# Patient Record
Sex: MALE | Race: WHITE | NOT HISPANIC OR LATINO | URBAN - METROPOLITAN AREA
[De-identification: names, ages, dates, MRNs, and addresses within clinical notes are randomized per-mention and may not be internally consistent; named-entity substitution may affect disease eponyms.]

---

## 2017-04-18 ENCOUNTER — HISTORICAL (OUTPATIENT)
Dept: LAB | Facility: HOSPITAL | Age: 27
End: 2017-04-18

## 2019-06-10 ENCOUNTER — HISTORICAL (OUTPATIENT)
Dept: LAB | Facility: HOSPITAL | Age: 29
End: 2019-06-10

## 2023-04-12 ENCOUNTER — HOSPITAL ENCOUNTER (EMERGENCY)
Facility: HOSPITAL | Age: 33
Discharge: PSYCHIATRIC OR SUBSTANCE USE FACILITY | End: 2023-04-12
Attending: SPECIALIST
Payer: MEDICARE

## 2023-04-12 VITALS
SYSTOLIC BLOOD PRESSURE: 113 MMHG | DIASTOLIC BLOOD PRESSURE: 75 MMHG | BODY MASS INDEX: 24.38 KG/M2 | OXYGEN SATURATION: 98 % | TEMPERATURE: 98 F | RESPIRATION RATE: 18 BRPM | WEIGHT: 190 LBS | HEART RATE: 85 BPM | HEIGHT: 74 IN

## 2023-04-12 DIAGNOSIS — F31.9 BIPOLAR AFFECTIVE DISORDER, REMISSION STATUS UNSPECIFIED: ICD-10-CM

## 2023-04-12 DIAGNOSIS — F23 ACUTE PSYCHOSIS: Primary | ICD-10-CM

## 2023-04-12 DIAGNOSIS — Z91.148 NONCOMPLIANCE WITH MEDICATION REGIMEN: ICD-10-CM

## 2023-04-12 DIAGNOSIS — F12.90 MARIJUANA USE: ICD-10-CM

## 2023-04-12 LAB
ALBUMIN SERPL-MCNC: 4 G/DL (ref 3.5–5)
ALBUMIN/GLOB SERPL: 1.2 RATIO (ref 1.1–2)
ALP SERPL-CCNC: 51 UNIT/L (ref 40–150)
ALT SERPL-CCNC: 17 UNIT/L (ref 0–55)
AMPHET UR QL SCN: NEGATIVE
APAP SERPL-MCNC: <17.4 UG/ML (ref 17.4–30)
APPEARANCE UR: CLEAR
AST SERPL-CCNC: 17 UNIT/L (ref 5–34)
BACTERIA #/AREA URNS AUTO: NORMAL /HPF
BARBITURATE SCN PRESENT UR: NEGATIVE
BASOPHILS # BLD AUTO: 0.01 X10(3)/MCL (ref 0–0.2)
BASOPHILS NFR BLD AUTO: 0.1 %
BENZODIAZ UR QL SCN: NEGATIVE
BILIRUB UR QL STRIP.AUTO: NEGATIVE MG/DL
BILIRUBIN DIRECT+TOT PNL SERPL-MCNC: 0.5 MG/DL
BUN SERPL-MCNC: 25.1 MG/DL (ref 8.9–20.6)
CALCIUM SERPL-MCNC: 9.6 MG/DL (ref 8.4–10.2)
CANNABINOIDS UR QL SCN: POSITIVE
CHLORIDE SERPL-SCNC: 111 MMOL/L (ref 98–107)
CO2 SERPL-SCNC: 21 MMOL/L (ref 22–29)
COCAINE UR QL SCN: NEGATIVE
COLOR UR AUTO: YELLOW
CREAT SERPL-MCNC: 0.85 MG/DL (ref 0.73–1.18)
EOSINOPHIL # BLD AUTO: 0.05 X10(3)/MCL (ref 0–0.9)
EOSINOPHIL NFR BLD AUTO: 0.5 %
ERYTHROCYTE [DISTWIDTH] IN BLOOD BY AUTOMATED COUNT: 12.4 % (ref 11.5–17)
ETHANOL SERPL-MCNC: <10 MG/DL
FLUAV AG UPPER RESP QL IA.RAPID: NOT DETECTED
FLUBV AG UPPER RESP QL IA.RAPID: NOT DETECTED
GFR SERPLBLD CREATININE-BSD FMLA CKD-EPI: >60 MLS/MIN/1.73/M2
GLOBULIN SER-MCNC: 3.4 GM/DL (ref 2.4–3.5)
GLUCOSE SERPL-MCNC: 100 MG/DL (ref 74–100)
GLUCOSE UR QL STRIP.AUTO: NEGATIVE MG/DL
HCT VFR BLD AUTO: 40.9 % (ref 42–52)
HGB BLD-MCNC: 13.3 G/DL (ref 14–18)
IMM GRANULOCYTES # BLD AUTO: 0.02 X10(3)/MCL (ref 0–0.04)
IMM GRANULOCYTES NFR BLD AUTO: 0.2 %
KETONES UR QL STRIP.AUTO: NEGATIVE MG/DL
LEUKOCYTE ESTERASE UR QL STRIP.AUTO: NEGATIVE UNIT/L
LYMPHOCYTES # BLD AUTO: 2.1 X10(3)/MCL (ref 0.6–4.6)
LYMPHOCYTES NFR BLD AUTO: 21 %
MCH RBC QN AUTO: 30.8 PG (ref 27–31)
MCHC RBC AUTO-ENTMCNC: 32.5 G/DL (ref 33–36)
MCV RBC AUTO: 94.7 FL (ref 80–94)
MONOCYTES # BLD AUTO: 0.76 X10(3)/MCL (ref 0.1–1.3)
MONOCYTES NFR BLD AUTO: 7.6 %
NEUTROPHILS # BLD AUTO: 7.07 X10(3)/MCL (ref 2.1–9.2)
NEUTROPHILS NFR BLD AUTO: 70.6 %
NITRITE UR QL STRIP.AUTO: NEGATIVE
OPIATES UR QL SCN: NEGATIVE
PCP UR QL: NEGATIVE
PH UR STRIP.AUTO: 6 [PH]
PH UR: 6 [PH] (ref 3–11)
PLATELET # BLD AUTO: 263 X10(3)/MCL (ref 130–400)
PMV BLD AUTO: 9.5 FL (ref 7.4–10.4)
POTASSIUM SERPL-SCNC: 4.1 MMOL/L (ref 3.5–5.1)
PROT SERPL-MCNC: 7.4 GM/DL (ref 6.4–8.3)
PROT UR QL STRIP.AUTO: ABNORMAL MG/DL
RBC # BLD AUTO: 4.32 X10(6)/MCL (ref 4.7–6.1)
RBC #/AREA URNS AUTO: NORMAL /HPF
RBC UR QL AUTO: NEGATIVE UNIT/L
SALICYLATES SERPL-MCNC: <5 MG/DL
SARS-COV-2 RNA RESP QL NAA+PROBE: NOT DETECTED
SODIUM SERPL-SCNC: 142 MMOL/L (ref 136–145)
SP GR UR STRIP.AUTO: >=1.03
SQUAMOUS #/AREA URNS AUTO: NORMAL /HPF
TSH SERPL-ACNC: 0.64 UIU/ML (ref 0.35–4.94)
UROBILINOGEN UR STRIP-ACNC: 0.2 MG/DL
WBC # SPEC AUTO: 10 X10(3)/MCL (ref 4.5–11.5)
WBC #/AREA URNS AUTO: NORMAL /HPF

## 2023-04-12 PROCEDURE — 80053 COMPREHEN METABOLIC PANEL: CPT | Performed by: SPECIALIST

## 2023-04-12 PROCEDURE — 81001 URINALYSIS AUTO W/SCOPE: CPT | Performed by: SPECIALIST

## 2023-04-12 PROCEDURE — 82077 ASSAY SPEC XCP UR&BREATH IA: CPT | Performed by: SPECIALIST

## 2023-04-12 PROCEDURE — 96372 THER/PROPH/DIAG INJ SC/IM: CPT | Performed by: SPECIALIST

## 2023-04-12 PROCEDURE — 63600175 PHARM REV CODE 636 W HCPCS: Performed by: SPECIALIST

## 2023-04-12 PROCEDURE — 99285 EMERGENCY DEPT VISIT HI MDM: CPT | Mod: 25

## 2023-04-12 PROCEDURE — 0240U COVID/FLU A&B PCR: CPT | Performed by: SPECIALIST

## 2023-04-12 PROCEDURE — 80179 DRUG ASSAY SALICYLATE: CPT | Performed by: SPECIALIST

## 2023-04-12 PROCEDURE — 80143 DRUG ASSAY ACETAMINOPHEN: CPT | Performed by: SPECIALIST

## 2023-04-12 PROCEDURE — 85025 COMPLETE CBC W/AUTO DIFF WBC: CPT | Performed by: SPECIALIST

## 2023-04-12 PROCEDURE — 84443 ASSAY THYROID STIM HORMONE: CPT | Performed by: SPECIALIST

## 2023-04-12 PROCEDURE — 96374 THER/PROPH/DIAG INJ IV PUSH: CPT

## 2023-04-12 PROCEDURE — 80307 DRUG TEST PRSMV CHEM ANLYZR: CPT | Performed by: SPECIALIST

## 2023-04-12 RX ORDER — DIPHENHYDRAMINE HYDROCHLORIDE 50 MG/ML
50 INJECTION INTRAMUSCULAR; INTRAVENOUS
Status: COMPLETED | OUTPATIENT
Start: 2023-04-12 | End: 2023-04-12

## 2023-04-12 RX ORDER — LORAZEPAM 2 MG/ML
1 INJECTION INTRAMUSCULAR
Status: COMPLETED | OUTPATIENT
Start: 2023-04-12 | End: 2023-04-12

## 2023-04-12 RX ORDER — ZIPRASIDONE MESYLATE 20 MG/ML
20 INJECTION, POWDER, LYOPHILIZED, FOR SOLUTION INTRAMUSCULAR
Status: COMPLETED | OUTPATIENT
Start: 2023-04-12 | End: 2023-04-12

## 2023-04-12 RX ADMIN — LORAZEPAM 1 MG: 2 INJECTION INTRAMUSCULAR; INTRAVENOUS at 01:04

## 2023-04-12 RX ADMIN — ZIPRASIDONE MESYLATE 20 MG: 20 INJECTION, POWDER, LYOPHILIZED, FOR SOLUTION INTRAMUSCULAR at 01:04

## 2023-04-12 RX ADMIN — DIPHENHYDRAMINE HYDROCHLORIDE 50 MG: 50 INJECTION, SOLUTION INTRAMUSCULAR; INTRAVENOUS at 01:04

## 2023-04-12 NOTE — ED NOTES
Spd here to  patient. Pt arouses easily, ambulates to wheelchair and then into car.  Dc'd in stable condition. PEC, belongings and ppw sent with spd

## 2023-04-12 NOTE — ED NOTES
Spoke to Briseyda with [facility].  Patient accepted to [facility] in [place]. [phone].   Attempted to call parents to notify of placement. No answer. Left voice message asking to return call.

## 2023-04-12 NOTE — ED NOTES
Father notified of patient being transferred to [facility] in [place]. His father is unsure if patient is on medications.

## 2023-04-12 NOTE — ED NOTES
"Patient denies si/hi. He denies visual or auditory hallucinations. He states " I'm just stoned man."  Said that his job lets him smoke weed now.  He said it was real weed and not synthetic. He did say something about edibles but he was not able to elaborate on if he took some or not.  He is hyper active. He is able to tell me his name, , that he is in the hospital, year, and president.   "

## 2023-04-12 NOTE — ED PROVIDER NOTES
Encounter Date: 4/12/2023       History     Chief Complaint   Patient presents with    Psychiatric Evaluation     Patient's parents was attempting to bring patient to ED and he jumped of out the vehicle and went up to a  at the store and asked for help. Father reports that he has not been sleeping lately and been talking out of his head. Hx of bipolar with psychosis. Patient with bizarre behavior in triage     32-year-old male with history of bipolar disorder brought in by his parents and police for bizarre behavior; patient's parents were bringing him to the emergency room when he jumped out of the car and found a  in the store and asked him to help him; patient arrived to emergency room with bizarre behavior and tangential thinking; delusional at times and states he just wants to smoke marijuana and not take his medications    The history is provided by the patient, a parent and the police.   Review of patient's allergies indicates:  No Known Allergies  History reviewed. No pertinent past medical history.  History reviewed. No pertinent surgical history.  History reviewed. No pertinent family history.     Review of Systems   Unable to perform ROS: Psychiatric disorder     Physical Exam     Initial Vitals [04/12/23 0058]   BP Pulse Resp Temp SpO2   125/78 (!) 117 20 99.8 °F (37.7 °C) 97 %      MAP       --         Physical Exam    Nursing note and vitals reviewed.  Constitutional: He appears well-developed and well-nourished.   Disheveled   HENT:   Head: Normocephalic and atraumatic.   Eyes: EOM are normal. Pupils are equal, round, and reactive to light.   Neck: Neck supple.   Normal range of motion.  Cardiovascular:  Normal rate, regular rhythm and normal heart sounds.           Pulmonary/Chest: Breath sounds normal.   Abdominal: Abdomen is soft. He exhibits no distension. There is no abdominal tenderness.   Musculoskeletal:         General: Normal range of motion.      Cervical back: Normal  range of motion and neck supple.     Neurological: He is alert and oriented to person, place, and time.   Skin: Skin is warm and dry.   Psychiatric: His affect is labile. His speech is tangential. He is hyperactive. Thought content is paranoid. He is inattentive.       ED Course   Procedures  Labs Reviewed   COMPREHENSIVE METABOLIC PANEL - Abnormal; Notable for the following components:       Result Value    Chloride 111 (*)     Carbon Dioxide 21 (*)     Blood Urea Nitrogen 25.1 (*)     All other components within normal limits   URINALYSIS, REFLEX TO URINE CULTURE - Abnormal; Notable for the following components:    Protein, UA Trace (*)     All other components within normal limits   DRUG SCREEN, URINE (BEAKER) - Abnormal; Notable for the following components:    Cannabinoids, Urine Positive (*)     All other components within normal limits    Narrative:     Cut off concentrations:    Amphetamines - 1000 ng/ml  Barbiturates - 200 ng/ml  Benzodiazepine - 200 ng/ml  Cannabinoids (THC) - 50 ng/ml  Cocaine - 300 ng/ml  Fentanyl - 1.0 ng/ml  MDMA - 500 ng/ml  Opiates - 300 ng/ml   Phencyclidine (PCP) - 25 ng/ml    Specimen submitted for drug analysis and tested for pH and specific gravity in order to evaluate sample integrity. Suspect tampering if specific gravity is <1.003 and/or pH is not within the range of 4.5 - 8.0  False negatives may result form substances such as bleach added to urine.  False positives may result for the presence of a substance with similar chemical structure to the drug or its metabolite.    This test provides only a PRELIMINARY analytical test result. A more specific alternate chemical method must be used in order to obtain a confirmed analytical result. Gas chromatography/mass spectrometry (GC/MS) is the preferred confirmatory method. Other chemical confirmation methods are available. Clinical consideration and professional judgement should be applied to any drug of abuse test result,  particularly when preliminary positive results are used.    Positive results will be confirmed only at the physicians request. Unconfirmed screening results are to be used only for medical purposes (treatment).        ACETAMINOPHEN LEVEL - Abnormal; Notable for the following components:    Acetaminophen Level <17.4 (*)     All other components within normal limits   CBC WITH DIFFERENTIAL - Abnormal; Notable for the following components:    RBC 4.32 (*)     Hgb 13.3 (*)     Hct 40.9 (*)     MCV 94.7 (*)     MCHC 32.5 (*)     All other components within normal limits   TSH - Normal   ALCOHOL,MEDICAL (ETHANOL) - Normal   COVID/FLU A&B PCR - Normal    Narrative:     The Xpert Xpress SARS-CoV-2/FLU/RSV plus is a rapid, multiplexed real-time PCR test intended for the simultaneous qualitative detection and differentiation of SARS-CoV-2, Influenza A, Influenza B, and respiratory syncytial virus (RSV) viral RNA in either nasopharyngeal swab or nasal swab specimens.         URINALYSIS, MICROSCOPIC - Normal   CBC W/ AUTO DIFFERENTIAL    Narrative:     The following orders were created for panel order CBC auto differential.  Procedure                               Abnormality         Status                     ---------                               -----------         ------                     CBC with Differential[[id number]]        Abnormal            Final result                 Please view results for these tests on the individual orders.   SALICYLATE LEVEL          Imaging Results    None          Medications   ziprasidone injection 20 mg (20 mg Intramuscular Given 4/12/23 0138)   LORazepam injection 1 mg (1 mg Intravenous Given 4/12/23 0138)   diphenhydrAMINE injection 50 mg (50 mg Intramuscular Given 4/12/23 0138)     Medical Decision Making:   History:   Old Medical Records: I decided to obtain old medical records.  Initial Assessment:   Bipolar patient who is noncompliant with medication and admits to marijuana;  brought in with bizarre behavior and tangential thinking  Differential Diagnosis:   Bipolar disorder, noncompliance with medication, drug use  Clinical Tests:   Lab Tests: Ordered and Reviewed  ED Management:  PEC instituted; sedation given; labs drawn; accepted at psychiatric facility           ED Course as of 04/12/23 0410 Wed Apr 12, 2023 0409 Accepted at [facility] psychiatric [DD]      ED Course User Index  [DD] Adarsh Marcial MD       Medically cleared for psychiatry placement: 4/12/2023  2:22 AM         Clinical Impression:   Final diagnoses:  [F23] Acute psychosis (Primary)  [F31.9] Bipolar affective disorder, remission status unspecified  [Z91.148] Noncompliance with medication regimen  [F12.90] Marijuana use        ED Disposition Condition    Transfer to Psych Facility Stable          ED Prescriptions    None       Follow-up Information    None          Adarsh Marcial MD  04/12/23 0411

## 2023-06-14 ENCOUNTER — OFFICE VISIT (OUTPATIENT)
Dept: FAMILY MEDICINE | Facility: CLINIC | Age: 33
End: 2023-06-14
Payer: MEDICARE

## 2023-06-14 VITALS
HEIGHT: 74 IN | SYSTOLIC BLOOD PRESSURE: 118 MMHG | HEART RATE: 67 BPM | RESPIRATION RATE: 18 BRPM | BODY MASS INDEX: 24.13 KG/M2 | OXYGEN SATURATION: 100 % | WEIGHT: 188 LBS | DIASTOLIC BLOOD PRESSURE: 77 MMHG | TEMPERATURE: 98 F

## 2023-06-14 DIAGNOSIS — F31.9 BIPOLAR AFFECTIVE DISORDER, REMISSION STATUS UNSPECIFIED: ICD-10-CM

## 2023-06-14 DIAGNOSIS — Z00.00 ENCOUNTER FOR WELLNESS EXAMINATION: Primary | ICD-10-CM

## 2023-06-14 DIAGNOSIS — R09.81 SINUS CONGESTION: ICD-10-CM

## 2023-06-14 LAB
ALBUMIN SERPL-MCNC: 4.5 G/DL (ref 3.5–5)
ALBUMIN/GLOB SERPL: 1.4 RATIO (ref 1.1–2)
ALP SERPL-CCNC: 50 UNIT/L (ref 40–150)
ALT SERPL-CCNC: 13 UNIT/L (ref 0–55)
APPEARANCE UR: CLEAR
AST SERPL-CCNC: 18 UNIT/L (ref 5–34)
BACTERIA #/AREA URNS AUTO: ABNORMAL /HPF
BASOPHILS # BLD AUTO: 0.02 X10(3)/MCL
BASOPHILS NFR BLD AUTO: 0.3 %
BILIRUB UR QL STRIP.AUTO: NEGATIVE MG/DL
BILIRUBIN DIRECT+TOT PNL SERPL-MCNC: 1.4 MG/DL
BUN SERPL-MCNC: 16.5 MG/DL (ref 8.9–20.6)
CALCIUM SERPL-MCNC: 9.6 MG/DL (ref 8.4–10.2)
CHLORIDE SERPL-SCNC: 108 MMOL/L (ref 98–107)
CHOLEST SERPL-MCNC: 178 MG/DL
CHOLEST/HDLC SERPL: 4 {RATIO} (ref 0–5)
CO2 SERPL-SCNC: 25 MMOL/L (ref 22–29)
COLOR UR: YELLOW
CREAT SERPL-MCNC: 0.84 MG/DL (ref 0.73–1.18)
EOSINOPHIL # BLD AUTO: 0.11 X10(3)/MCL (ref 0–0.9)
EOSINOPHIL NFR BLD AUTO: 1.9 %
ERYTHROCYTE [DISTWIDTH] IN BLOOD BY AUTOMATED COUNT: 12.7 % (ref 11.5–17)
EST. AVERAGE GLUCOSE BLD GHB EST-MCNC: 91.1 MG/DL
GFR SERPLBLD CREATININE-BSD FMLA CKD-EPI: >60 MLS/MIN/1.73/M2
GLOBULIN SER-MCNC: 3.2 GM/DL (ref 2.4–3.5)
GLUCOSE SERPL-MCNC: 89 MG/DL (ref 74–100)
GLUCOSE UR QL STRIP.AUTO: NORMAL MG/DL
HAV IGM SERPL QL IA: NONREACTIVE
HBA1C MFR BLD: 4.8 %
HBV CORE IGM SERPL QL IA: NONREACTIVE
HBV SURFACE AG SERPL QL IA: NONREACTIVE
HCT VFR BLD AUTO: 43 % (ref 42–52)
HCV AB SERPL QL IA: NONREACTIVE
HDLC SERPL-MCNC: 44 MG/DL (ref 35–60)
HGB BLD-MCNC: 14.4 G/DL (ref 14–18)
HIV 1+2 AB+HIV1 P24 AG SERPL QL IA: NONREACTIVE
HYALINE CASTS #/AREA URNS LPF: ABNORMAL /LPF
IMM GRANULOCYTES # BLD AUTO: 0.03 X10(3)/MCL (ref 0–0.04)
IMM GRANULOCYTES NFR BLD AUTO: 0.5 %
KETONES UR QL STRIP.AUTO: NEGATIVE MG/DL
LDLC SERPL CALC-MCNC: 122 MG/DL (ref 50–140)
LEUKOCYTE ESTERASE UR QL STRIP.AUTO: NEGATIVE UNIT/L
LYMPHOCYTES # BLD AUTO: 1.93 X10(3)/MCL (ref 0.6–4.6)
LYMPHOCYTES NFR BLD AUTO: 32.8 %
MCH RBC QN AUTO: 31.6 PG (ref 27–31)
MCHC RBC AUTO-ENTMCNC: 33.5 G/DL (ref 33–36)
MCV RBC AUTO: 94.3 FL (ref 80–94)
MONOCYTES # BLD AUTO: 0.41 X10(3)/MCL (ref 0.1–1.3)
MONOCYTES NFR BLD AUTO: 7 %
MUCOUS THREADS URNS QL MICRO: ABNORMAL /LPF
NEUTROPHILS # BLD AUTO: 3.39 X10(3)/MCL (ref 2.1–9.2)
NEUTROPHILS NFR BLD AUTO: 57.5 %
NITRITE UR QL STRIP.AUTO: NEGATIVE
NRBC BLD AUTO-RTO: 0 %
PH UR STRIP.AUTO: 6.5 [PH]
PLATELET # BLD AUTO: 216 X10(3)/MCL (ref 130–400)
PMV BLD AUTO: 9.9 FL (ref 7.4–10.4)
POTASSIUM SERPL-SCNC: 4.3 MMOL/L (ref 3.5–5.1)
PROT SERPL-MCNC: 7.7 GM/DL (ref 6.4–8.3)
PROT UR QL STRIP.AUTO: ABNORMAL MG/DL
RBC # BLD AUTO: 4.56 X10(6)/MCL (ref 4.7–6.1)
RBC #/AREA URNS AUTO: ABNORMAL /HPF
RBC UR QL AUTO: NEGATIVE UNIT/L
SODIUM SERPL-SCNC: 141 MMOL/L (ref 136–145)
SP GR UR STRIP.AUTO: 1.03
SQUAMOUS #/AREA URNS LPF: ABNORMAL /HPF
T PALLIDUM AB SER QL: NONREACTIVE
T4 FREE SERPL-MCNC: 1.07 NG/DL (ref 0.7–1.48)
TRIGL SERPL-MCNC: 58 MG/DL (ref 34–140)
TSH SERPL-ACNC: 1.14 UIU/ML (ref 0.35–4.94)
UROBILINOGEN UR STRIP-ACNC: NORMAL MG/DL
VLDLC SERPL CALC-MCNC: 12 MG/DL
WBC # SPEC AUTO: 5.89 X10(3)/MCL (ref 4.5–11.5)
WBC #/AREA URNS AUTO: ABNORMAL /HPF

## 2023-06-14 PROCEDURE — 81001 URINALYSIS AUTO W/SCOPE: CPT

## 2023-06-14 PROCEDURE — 3074F PR MOST RECENT SYSTOLIC BLOOD PRESSURE < 130 MM HG: ICD-10-PCS | Mod: CPTII,,,

## 2023-06-14 PROCEDURE — 99214 OFFICE O/P EST MOD 30 MIN: CPT | Mod: PBBFAC,PN

## 2023-06-14 PROCEDURE — 84443 ASSAY THYROID STIM HORMONE: CPT

## 2023-06-14 PROCEDURE — 3008F PR BODY MASS INDEX (BMI) DOCUMENTED: ICD-10-PCS | Mod: CPTII,,,

## 2023-06-14 PROCEDURE — 99385 PR PREVENTIVE VISIT,NEW,18-39: ICD-10-PCS | Mod: S$PBB,,,

## 2023-06-14 PROCEDURE — 3078F DIAST BP <80 MM HG: CPT | Mod: CPTII,,,

## 2023-06-14 PROCEDURE — 3008F BODY MASS INDEX DOCD: CPT | Mod: CPTII,,,

## 2023-06-14 PROCEDURE — 1159F MED LIST DOCD IN RCRD: CPT | Mod: CPTII,,,

## 2023-06-14 PROCEDURE — 80074 ACUTE HEPATITIS PANEL: CPT

## 2023-06-14 PROCEDURE — 3074F SYST BP LT 130 MM HG: CPT | Mod: CPTII,,,

## 2023-06-14 PROCEDURE — 3078F PR MOST RECENT DIASTOLIC BLOOD PRESSURE < 80 MM HG: ICD-10-PCS | Mod: CPTII,,,

## 2023-06-14 PROCEDURE — 87389 HIV-1 AG W/HIV-1&-2 AB AG IA: CPT

## 2023-06-14 PROCEDURE — 84439 ASSAY OF FREE THYROXINE: CPT

## 2023-06-14 PROCEDURE — 1159F PR MEDICATION LIST DOCUMENTED IN MEDICAL RECORD: ICD-10-PCS | Mod: CPTII,,,

## 2023-06-14 PROCEDURE — 83036 HEMOGLOBIN GLYCOSYLATED A1C: CPT

## 2023-06-14 PROCEDURE — 80061 LIPID PANEL: CPT

## 2023-06-14 PROCEDURE — 36415 COLL VENOUS BLD VENIPUNCTURE: CPT

## 2023-06-14 PROCEDURE — 85025 COMPLETE CBC W/AUTO DIFF WBC: CPT

## 2023-06-14 PROCEDURE — 99385 PREV VISIT NEW AGE 18-39: CPT | Mod: S$PBB,,,

## 2023-06-14 PROCEDURE — 80053 COMPREHEN METABOLIC PANEL: CPT

## 2023-06-14 PROCEDURE — 1160F PR REVIEW ALL MEDS BY PRESCRIBER/CLIN PHARMACIST DOCUMENTED: ICD-10-PCS | Mod: CPTII,,,

## 2023-06-14 PROCEDURE — 86780 TREPONEMA PALLIDUM: CPT

## 2023-06-14 PROCEDURE — 1160F RVW MEDS BY RX/DR IN RCRD: CPT | Mod: CPTII,,,

## 2023-06-14 RX ORDER — FLUTICASONE PROPIONATE 50 MCG
1 SPRAY, SUSPENSION (ML) NASAL DAILY
Qty: 18.2 ML | Refills: 3 | Status: SHIPPED | OUTPATIENT
Start: 2023-06-14

## 2023-06-14 RX ORDER — OLANZAPINE 10 MG/1
10 TABLET ORAL NIGHTLY
COMMUNITY
Start: 2023-04-19 | End: 2023-06-14 | Stop reason: SDUPTHER

## 2023-06-14 RX ORDER — OLANZAPINE 10 MG/1
10 TABLET ORAL NIGHTLY
Qty: 30 TABLET | Refills: 0 | Status: SHIPPED | OUTPATIENT
Start: 2023-06-14 | End: 2023-07-14

## 2023-06-14 RX ORDER — LORATADINE 10 MG/1
10 TABLET ORAL DAILY
Qty: 30 TABLET | Refills: 3 | Status: SHIPPED | OUTPATIENT
Start: 2023-06-14

## 2023-06-14 NOTE — PROGRESS NOTES
"Patient Name: Oscar Casneco   : 1990  MRN: [id number]     Subjective:   Patient ID: Oscar Canseco is a 32 y.o. male.    Chief Complaint:   Chief Complaint   Patient presents with    Establish Care        HPI: 2023:  Patient presents to clinic today to establish care, was recently admitted to [facility] where he was started on Zyprexa.  Patient has history of bipolar disorder.  Patient states that Abilify "did not work well for him", Seroquel made him too drowsy.  Does not have current outpatient psychiatric provider to help manage his bipolar disorder.  Patient does complain of general sinus congestion associated with seasonal changes.  Patient denies chest pain, palpitations, and shortness of breath.  Patient denies fever, night sweats, chills, nausea, vomiting, diarrhea, constipation, weight loss, and changes in appetite. Denies SI/HI, yousif or hallucinations.             ROS:  Review of Systems   Constitutional:  Negative for chills, fever and weight loss.   HENT:  Positive for congestion. Negative for ear discharge, nosebleeds and tinnitus.    Eyes:  Negative for blurred vision, photophobia and pain.   Respiratory:  Negative for cough, shortness of breath, wheezing and stridor.    Cardiovascular:  Negative for chest pain, palpitations and orthopnea.   Gastrointestinal:  Negative for abdominal pain, heartburn and nausea.   Genitourinary:  Negative for dysuria, frequency, hematuria and urgency.   Musculoskeletal:  Negative for falls and myalgias.   Skin:  Negative for itching and rash.   Neurological:  Negative for dizziness, sensory change, speech change, focal weakness, seizures, weakness and headaches.   Endo/Heme/Allergies:  Negative for environmental allergies. Does not bruise/bleed easily.   Psychiatric/Behavioral:  Negative for hallucinations and suicidal ideas.     History:   History reviewed. No pertinent past medical history.   History reviewed. No pertinent surgical history.  History " "reviewed. No pertinent family history.   Social History     Tobacco Use    Smoking status: Every Day     Packs/day: 0.50     Types: Cigarettes    Smokeless tobacco: Never   Substance and Sexual Activity    Alcohol use: Not Currently    Drug use: Not Currently     Types: Opium    Sexual activity: Not on file        Allergies: Review of patient's allergies indicates:  No Known Allergies  Objective:     Vitals:    06/14/23 0742   BP: 118/77   Pulse: 67   Resp: 18   Temp: 98.3 °F (36.8 °C)   SpO2: 100%   Weight: 85.3 kg (188 lb)   Height: 6' 2" (1.88 m)     Body mass index is 24.14 kg/m².     Physical Examination:   Physical Exam  Vitals reviewed.   Constitutional:       Appearance: Normal appearance. He is normal weight.   HENT:      Head: Normocephalic.      Right Ear: Tympanic membrane, ear canal and external ear normal.      Left Ear: Tympanic membrane, ear canal and external ear normal.      Nose: Nose normal.      Mouth/Throat:      Mouth: Mucous membranes are moist.      Pharynx: Oropharynx is clear.   Eyes:      Extraocular Movements: Extraocular movements intact.      Conjunctiva/sclera: Conjunctivae normal.      Pupils: Pupils are equal, round, and reactive to light.   Cardiovascular:      Rate and Rhythm: Normal rate and regular rhythm.      Pulses: Normal pulses.      Heart sounds: Normal heart sounds.   Pulmonary:      Effort: Pulmonary effort is normal.      Breath sounds: Normal breath sounds.   Abdominal:      General: Abdomen is flat. Bowel sounds are normal.      Palpations: Abdomen is soft.   Musculoskeletal:         General: Normal range of motion.      Cervical back: Normal range of motion and neck supple.   Skin:     General: Skin is warm and dry.   Neurological:      General: No focal deficit present.      Mental Status: He is alert and oriented to person, place, and time.   Psychiatric:         Mood and Affect: Mood normal.         Behavior: Behavior normal.       Assessment and Plan     Problem " List Items Addressed This Visit          Psychiatric    Bipolar disorder    Current Assessment & Plan     Chronic issue, continue medications as managed by psychiatric provider    Read positive daily meditations, avoid negative media, set healthy boundaries.  Exercise daily, keep consistent sleep pattern, eat a healthy diet.  Establish good social support, make changes to reduce stress.  Reports any symptoms of suicidal/homicidal ideations or self harm immediately, if clinic is closed go to nearest emergency room.           Relevant Medications    OLANZapine (ZYPREXA) 10 MG tablet    Other Relevant Orders    Ambulatory referral/consult to Behavioral Health     Other Visit Diagnoses       Encounter for wellness examination    -  Primary    Relevant Orders    TSH (Completed)    T4, Free (Completed)    Hemoglobin A1C (Completed)    SYPHILIS ANTIBODY (WITH REFLEX RPR) (Completed)    Hepatitis Panel, Acute    Lipid Panel (Completed)    CBC Auto Differential (Completed)    Comprehensive Metabolic Panel (Completed)    HIV 1/2 Ag/Ab (4th Gen) (Completed)    Urinalysis, Reflex to Urine Culture (Completed)    Pathologist Interpretation    Sinus congestion        Relevant Medications    fluticasone propionate (FLONASE) 50 mcg/actuation nasal spray    loratadine (CLARITIN) 10 mg tablet              Oscar was seen today for establish care.    Diagnoses and all orders for this visit:    Encounter for wellness examination  -     TSH  -     T4, Free  -     Hemoglobin A1C  -     SYPHILIS ANTIBODY (WITH REFLEX RPR)  -     Hepatitis Panel, Acute  -     Lipid Panel  -     CBC Auto Differential  -     Comprehensive Metabolic Panel  -     HIV 1/2 Ag/Ab (4th Gen)  -     Urinalysis, Reflex to Urine Culture  -     Pathologist Interpretation    Bipolar affective disorder, remission status unspecified  -     OLANZapine (ZYPREXA) 10 MG tablet; Take 1 tablet (10 mg total) by mouth every evening.  -     Ambulatory referral/consult to Behavioral  Health; Future    Sinus congestion  -     fluticasone propionate (FLONASE) 50 mcg/actuation nasal spray; 1 spray (50 mcg total) by Each Nostril route once daily.  -     loratadine (CLARITIN) 10 mg tablet; Take 1 tablet (10 mg total) by mouth once daily.         No follow-ups on file.     This note was created with the assistance of Dragon voice recognition software or phone dictation. There may be transcription errors as a result of using this technology however minimal. Effort has been made to assure accuracy of transcription but any obvious errors or omissions should be clarified with the author of the document

## 2023-06-14 NOTE — ASSESSMENT & PLAN NOTE
Chronic issue, continue medications as managed by psychiatric provider    Read positive daily meditations, avoid negative media, set healthy boundaries.  Exercise daily, keep consistent sleep pattern, eat a healthy diet.  Establish good social support, make changes to reduce stress.  Reports any symptoms of suicidal/homicidal ideations or self harm immediately, if clinic is closed go to nearest emergency room.

## 2023-06-15 LAB — PATH REV: NORMAL

## 2024-11-07 ENCOUNTER — HOSPITAL ENCOUNTER (EMERGENCY)
Facility: HOSPITAL | Age: 34
Discharge: HOME | End: 2024-11-07
Attending: STUDENT IN AN ORGANIZED HEALTH CARE EDUCATION/TRAINING PROGRAM
Payer: MEDICARE

## 2024-11-07 ENCOUNTER — HOSPITAL ENCOUNTER (INPATIENT)
Facility: HOSPITAL | Age: 34
LOS: 7 days | Discharge: HOME | DRG: 885 | End: 2024-11-14
Attending: PSYCHIATRY & NEUROLOGY | Admitting: PSYCHIATRY & NEUROLOGY
Payer: MEDICARE

## 2024-11-07 VITALS
DIASTOLIC BLOOD PRESSURE: 66 MMHG | BODY MASS INDEX: 25.22 KG/M2 | SYSTOLIC BLOOD PRESSURE: 113 MMHG | HEART RATE: 80 BPM | RESPIRATION RATE: 18 BRPM | HEIGHT: 73 IN | WEIGHT: 190.31 LBS | TEMPERATURE: 98 F | OXYGEN SATURATION: 97 %

## 2024-11-07 DIAGNOSIS — F23 ACUTE PSYCHOSIS: ICD-10-CM

## 2024-11-07 DIAGNOSIS — Z00.8 MEDICAL CLEARANCE FOR PSYCHIATRIC ADMISSION: Primary | ICD-10-CM

## 2024-11-07 DIAGNOSIS — F29 PSYCHOSIS: ICD-10-CM

## 2024-11-07 LAB
ALBUMIN SERPL-MCNC: 4.4 G/DL (ref 3.5–5)
ALBUMIN/GLOB SERPL: 1.4 RATIO (ref 1.1–2)
ALP SERPL-CCNC: 53 UNIT/L (ref 40–150)
ALT SERPL-CCNC: 17 UNIT/L (ref 0–55)
AMPHET UR QL SCN: NEGATIVE
ANION GAP SERPL CALC-SCNC: 8 MEQ/L
APAP SERPL-MCNC: <3 UG/ML (ref 10–30)
AST SERPL-CCNC: 32 UNIT/L (ref 5–34)
BACTERIA #/AREA URNS AUTO: NORMAL /HPF
BARBITURATE SCN PRESENT UR: NEGATIVE
BASOPHILS # BLD AUTO: 0.01 X10(3)/MCL
BASOPHILS NFR BLD AUTO: 0.1 %
BENZODIAZ UR QL SCN: NEGATIVE
BILIRUB SERPL-MCNC: 1.5 MG/DL
BILIRUB UR QL STRIP.AUTO: ABNORMAL
BUN SERPL-MCNC: 13.4 MG/DL (ref 8.9–20.6)
CALCIUM SERPL-MCNC: 9.4 MG/DL (ref 8.4–10.2)
CANNABINOIDS UR QL SCN: POSITIVE
CHLORIDE SERPL-SCNC: 110 MMOL/L (ref 98–107)
CLARITY UR: CLEAR
CO2 SERPL-SCNC: 19 MMOL/L (ref 22–29)
COCAINE UR QL SCN: NEGATIVE
COLOR UR AUTO: YELLOW
CREAT SERPL-MCNC: 0.87 MG/DL (ref 0.72–1.25)
CREAT/UREA NIT SERPL: 15
EOSINOPHIL # BLD AUTO: 0.01 X10(3)/MCL (ref 0–0.9)
EOSINOPHIL NFR BLD AUTO: 0.1 %
ERYTHROCYTE [DISTWIDTH] IN BLOOD BY AUTOMATED COUNT: 12.4 % (ref 11.5–17)
ETHANOL SERPL-MCNC: <10 MG/DL
FENTANYL UR QL SCN: NEGATIVE
FLUAV AG UPPER RESP QL IA.RAPID: NOT DETECTED
FLUBV AG UPPER RESP QL IA.RAPID: NOT DETECTED
GFR SERPLBLD CREATININE-BSD FMLA CKD-EPI: >60 ML/MIN/1.73/M2
GLOBULIN SER-MCNC: 3.1 GM/DL (ref 2.4–3.5)
GLUCOSE SERPL-MCNC: 148 MG/DL (ref 74–100)
GLUCOSE UR QL STRIP: NEGATIVE
HCT VFR BLD AUTO: 38.3 % (ref 42–52)
HGB BLD-MCNC: 13.3 G/DL (ref 14–18)
HGB UR QL STRIP: NEGATIVE
IMM GRANULOCYTES # BLD AUTO: 0.01 X10(3)/MCL (ref 0–0.04)
IMM GRANULOCYTES NFR BLD AUTO: 0.1 %
KETONES UR QL STRIP: 15
LEUKOCYTE ESTERASE UR QL STRIP: NEGATIVE
LYMPHOCYTES # BLD AUTO: 0.85 X10(3)/MCL (ref 0.6–4.6)
LYMPHOCYTES NFR BLD AUTO: 7.3 %
MCH RBC QN AUTO: 32.1 PG (ref 27–31)
MCHC RBC AUTO-ENTMCNC: 34.7 G/DL (ref 33–36)
MCV RBC AUTO: 92.5 FL (ref 80–94)
MONOCYTES # BLD AUTO: 0.73 X10(3)/MCL (ref 0.1–1.3)
MONOCYTES NFR BLD AUTO: 6.2 %
NEUTROPHILS # BLD AUTO: 10.08 X10(3)/MCL (ref 2.1–9.2)
NEUTROPHILS NFR BLD AUTO: 86.2 %
NITRITE UR QL STRIP: NEGATIVE
OPIATES UR QL SCN: NEGATIVE
PCP UR QL: NEGATIVE
PH UR STRIP: 6 [PH]
PH UR: 6 [PH] (ref 3–11)
PLATELET # BLD AUTO: 192 X10(3)/MCL (ref 130–400)
PMV BLD AUTO: 9.6 FL (ref 7.4–10.4)
POTASSIUM SERPL-SCNC: 3.6 MMOL/L (ref 3.5–5.1)
PROT SERPL-MCNC: 7.5 GM/DL (ref 6.4–8.3)
PROT UR QL STRIP: 30
RBC # BLD AUTO: 4.14 X10(6)/MCL (ref 4.7–6.1)
RBC #/AREA URNS AUTO: NORMAL /HPF
SARS-COV-2 RNA RESP QL NAA+PROBE: NOT DETECTED
SODIUM SERPL-SCNC: 137 MMOL/L (ref 136–145)
SP GR UR STRIP.AUTO: 1.02 (ref 1–1.03)
SPECIFIC GRAVITY, URINE AUTO (.000) (OHS): 1.02 (ref 1–1.03)
SQUAMOUS #/AREA URNS AUTO: NORMAL /HPF
TSH SERPL-ACNC: 0.78 UIU/ML (ref 0.35–4.94)
UROBILINOGEN UR STRIP-ACNC: 0.2
WBC # BLD AUTO: 11.69 X10(3)/MCL (ref 4.5–11.5)
WBC #/AREA URNS AUTO: NORMAL /HPF

## 2024-11-07 PROCEDURE — 99284 EMERGENCY DEPT VISIT MOD MDM: CPT | Mod: 25

## 2024-11-07 PROCEDURE — 12400001 HC PSYCH SEMI-PRIVATE ROOM

## 2024-11-07 PROCEDURE — 25000003 PHARM REV CODE 250: Performed by: PSYCHIATRY & NEUROLOGY

## 2024-11-07 PROCEDURE — 96372 THER/PROPH/DIAG INJ SC/IM: CPT | Performed by: STUDENT IN AN ORGANIZED HEALTH CARE EDUCATION/TRAINING PROGRAM

## 2024-11-07 PROCEDURE — 84443 ASSAY THYROID STIM HORMONE: CPT | Performed by: STUDENT IN AN ORGANIZED HEALTH CARE EDUCATION/TRAINING PROGRAM

## 2024-11-07 PROCEDURE — 80053 COMPREHEN METABOLIC PANEL: CPT | Performed by: STUDENT IN AN ORGANIZED HEALTH CARE EDUCATION/TRAINING PROGRAM

## 2024-11-07 PROCEDURE — 25000003 PHARM REV CODE 250: Performed by: STUDENT IN AN ORGANIZED HEALTH CARE EDUCATION/TRAINING PROGRAM

## 2024-11-07 PROCEDURE — 80307 DRUG TEST PRSMV CHEM ANLYZR: CPT | Performed by: STUDENT IN AN ORGANIZED HEALTH CARE EDUCATION/TRAINING PROGRAM

## 2024-11-07 PROCEDURE — S4991 NICOTINE PATCH NONLEGEND: HCPCS | Performed by: STUDENT IN AN ORGANIZED HEALTH CARE EDUCATION/TRAINING PROGRAM

## 2024-11-07 PROCEDURE — 85025 COMPLETE CBC W/AUTO DIFF WBC: CPT | Performed by: STUDENT IN AN ORGANIZED HEALTH CARE EDUCATION/TRAINING PROGRAM

## 2024-11-07 PROCEDURE — 63600175 PHARM REV CODE 636 W HCPCS: Performed by: STUDENT IN AN ORGANIZED HEALTH CARE EDUCATION/TRAINING PROGRAM

## 2024-11-07 PROCEDURE — 0240U COVID/FLU A&B PCR: CPT | Performed by: STUDENT IN AN ORGANIZED HEALTH CARE EDUCATION/TRAINING PROGRAM

## 2024-11-07 PROCEDURE — 80143 DRUG ASSAY ACETAMINOPHEN: CPT | Performed by: STUDENT IN AN ORGANIZED HEALTH CARE EDUCATION/TRAINING PROGRAM

## 2024-11-07 PROCEDURE — 81003 URINALYSIS AUTO W/O SCOPE: CPT | Performed by: STUDENT IN AN ORGANIZED HEALTH CARE EDUCATION/TRAINING PROGRAM

## 2024-11-07 PROCEDURE — 82077 ASSAY SPEC XCP UR&BREATH IA: CPT | Performed by: STUDENT IN AN ORGANIZED HEALTH CARE EDUCATION/TRAINING PROGRAM

## 2024-11-07 RX ORDER — LORAZEPAM 1 MG/1
2 TABLET ORAL EVERY 4 HOURS PRN
Status: DISCONTINUED | OUTPATIENT
Start: 2024-11-07 | End: 2024-11-14 | Stop reason: HOSPADM

## 2024-11-07 RX ORDER — LORAZEPAM 2 MG/ML
2 INJECTION INTRAMUSCULAR EVERY 4 HOURS PRN
Status: DISCONTINUED | OUTPATIENT
Start: 2024-11-07 | End: 2024-11-14 | Stop reason: HOSPADM

## 2024-11-07 RX ORDER — ADHESIVE BANDAGE
30 BANDAGE TOPICAL DAILY PRN
Status: DISCONTINUED | OUTPATIENT
Start: 2024-11-07 | End: 2024-11-14 | Stop reason: HOSPADM

## 2024-11-07 RX ORDER — HYDROXYZINE HYDROCHLORIDE 50 MG/1
50 TABLET, FILM COATED ORAL EVERY 4 HOURS PRN
Status: DISCONTINUED | OUTPATIENT
Start: 2024-11-07 | End: 2024-11-14 | Stop reason: HOSPADM

## 2024-11-07 RX ORDER — TRAZODONE HYDROCHLORIDE 100 MG/1
100 TABLET ORAL NIGHTLY PRN
Status: DISCONTINUED | OUTPATIENT
Start: 2024-11-07 | End: 2024-11-14 | Stop reason: HOSPADM

## 2024-11-07 RX ORDER — IBUPROFEN 200 MG
1 TABLET ORAL
Status: DISCONTINUED | OUTPATIENT
Start: 2024-11-07 | End: 2024-11-07 | Stop reason: HOSPADM

## 2024-11-07 RX ORDER — ONDANSETRON 4 MG/1
4 TABLET, ORALLY DISINTEGRATING ORAL EVERY 6 HOURS PRN
Status: DISCONTINUED | OUTPATIENT
Start: 2024-11-07 | End: 2024-11-14 | Stop reason: HOSPADM

## 2024-11-07 RX ORDER — DIPHENHYDRAMINE HCL 50 MG
50 CAPSULE ORAL EVERY 4 HOURS PRN
Status: DISCONTINUED | OUTPATIENT
Start: 2024-11-07 | End: 2024-11-14 | Stop reason: HOSPADM

## 2024-11-07 RX ORDER — LORAZEPAM 2 MG/ML
2 INJECTION INTRAMUSCULAR
Status: COMPLETED | OUTPATIENT
Start: 2024-11-07 | End: 2024-11-07

## 2024-11-07 RX ORDER — DIPHENHYDRAMINE HYDROCHLORIDE 50 MG/ML
50 INJECTION INTRAMUSCULAR; INTRAVENOUS EVERY 4 HOURS PRN
Status: DISCONTINUED | OUTPATIENT
Start: 2024-11-07 | End: 2024-11-14 | Stop reason: HOSPADM

## 2024-11-07 RX ORDER — IBUPROFEN 200 MG
1 TABLET ORAL DAILY
Status: DISCONTINUED | OUTPATIENT
Start: 2024-11-08 | End: 2024-11-14 | Stop reason: HOSPADM

## 2024-11-07 RX ORDER — HALOPERIDOL 5 MG/1
10 TABLET ORAL EVERY 4 HOURS PRN
Status: DISCONTINUED | OUTPATIENT
Start: 2024-11-07 | End: 2024-11-14 | Stop reason: HOSPADM

## 2024-11-07 RX ORDER — DROPERIDOL 2.5 MG/ML
1.25 INJECTION, SOLUTION INTRAMUSCULAR; INTRAVENOUS
Status: COMPLETED | OUTPATIENT
Start: 2024-11-07 | End: 2024-11-07

## 2024-11-07 RX ORDER — DIPHENHYDRAMINE HYDROCHLORIDE 50 MG/ML
50 INJECTION INTRAMUSCULAR; INTRAVENOUS
Status: COMPLETED | OUTPATIENT
Start: 2024-11-07 | End: 2024-11-07

## 2024-11-07 RX ORDER — ACETAMINOPHEN 325 MG/1
650 TABLET ORAL EVERY 6 HOURS PRN
Status: DISCONTINUED | OUTPATIENT
Start: 2024-11-07 | End: 2024-11-14 | Stop reason: HOSPADM

## 2024-11-07 RX ORDER — HALOPERIDOL 5 MG/ML
10 INJECTION INTRAMUSCULAR EVERY 4 HOURS PRN
Status: DISCONTINUED | OUTPATIENT
Start: 2024-11-07 | End: 2024-11-14 | Stop reason: HOSPADM

## 2024-11-07 RX ORDER — ALUMINUM HYDROXIDE, MAGNESIUM HYDROXIDE, AND SIMETHICONE 1200; 120; 1200 MG/30ML; MG/30ML; MG/30ML
30 SUSPENSION ORAL EVERY 6 HOURS PRN
Status: DISCONTINUED | OUTPATIENT
Start: 2024-11-07 | End: 2024-11-14 | Stop reason: HOSPADM

## 2024-11-07 RX ADMIN — TRAZODONE HYDROCHLORIDE 100 MG: 100 TABLET ORAL at 07:11

## 2024-11-07 RX ADMIN — LORAZEPAM 2 MG: 2 INJECTION INTRAMUSCULAR; INTRAVENOUS at 03:11

## 2024-11-07 RX ADMIN — HALOPERIDOL 10 MG: 5 TABLET ORAL at 07:11

## 2024-11-07 RX ADMIN — DIPHENHYDRAMINE HYDROCHLORIDE 50 MG: 50 INJECTION, SOLUTION INTRAMUSCULAR; INTRAVENOUS at 12:11

## 2024-11-07 RX ADMIN — LORAZEPAM 2 MG: 1 TABLET ORAL at 07:11

## 2024-11-07 RX ADMIN — DROPERIDOL 1.25 MG: 2.5 INJECTION, SOLUTION INTRAMUSCULAR; INTRAVENOUS at 12:11

## 2024-11-07 RX ADMIN — NICOTINE 1 PATCH: 21 PATCH, EXTENDED RELEASE TRANSDERMAL at 03:11

## 2024-11-07 RX ADMIN — DIPHENHYDRAMINE HYDROCHLORIDE 50 MG: 50 CAPSULE ORAL at 07:11

## 2024-11-07 NOTE — ED PROVIDER NOTES
Encounter Date: 11/7/2024       History     Chief Complaint   Patient presents with    Psychiatric Evaluation     Pt brought in by mother. She reports he hasn't been sleeping in a few days.  He has a hx of bipolar with psychosis. He reports he has been taking his medications. Mother reports hx of drug abuse.  He has multiple ant bites to feet. He denies hi/si or hallucinations.      HPI  Patient was a 34-year-old male past medical history bipolar disorder, who presents to the ER for psychiatric evaluation secondary to concerns for psychosis, decreased sleep, worsening hallucinations.  Patient was reports he has been taking his medications as prescribed.  Patient was denies HI, or SI.  Review of patient's allergies indicates:  No Known Allergies  No past medical history on file.  No past surgical history on file.  No family history on file.  Social History     Tobacco Use    Smoking status: Every Day     Current packs/day: 0.50     Types: Cigarettes    Smokeless tobacco: Never   Substance Use Topics    Alcohol use: Not Currently    Drug use: Not Currently     Types: Opium     Review of Systems   Constitutional:  Negative for fever.   HENT:  Negative for sore throat.    Eyes:  Negative for visual disturbance.   Respiratory:  Negative for shortness of breath.    Cardiovascular:  Negative for chest pain.   Gastrointestinal:  Negative for nausea.   Endocrine: Negative for polyuria.   Genitourinary:  Negative for dysuria.   Musculoskeletal:  Negative for back pain.   Skin:  Negative for rash.   Neurological:  Negative for weakness.   Hematological:  Does not bruise/bleed easily.   Psychiatric/Behavioral:  Positive for agitation and sleep disturbance. The patient is hyperactive.    All other systems reviewed and are negative.      Physical Exam     Initial Vitals [11/07/24 1155]   BP Pulse Resp Temp SpO2   (!) 175/102 100 18 98.8 °F (37.1 °C) 98 %      MAP       --         Physical Exam    Nursing note and vitals  reviewed.  Constitutional: Vital signs are normal. He appears well-developed and well-nourished. He is not diaphoretic. He is active.  Non-toxic appearance. He does not appear ill. No distress.   HENT:   Head: Normocephalic and atraumatic.   Eyes: Conjunctivae are normal. Pupils are equal, round, and reactive to light. Right conjunctiva is not injected. Left conjunctiva is not injected.   Neck: Trachea normal. Neck supple.   Normal range of motion.   Full passive range of motion without pain.     Cardiovascular:  Normal rate, regular rhythm, S1 normal, S2 normal, intact distal pulses and normal pulses.           Pulmonary/Chest: Breath sounds normal.   Abdominal: Abdomen is soft. Bowel sounds are normal. There is no abdominal tenderness.   Musculoskeletal:         General: No edema. Normal range of motion.      Cervical back: Full passive range of motion without pain, normal range of motion and neck supple. No rigidity.      Right lower leg: No swelling. No edema.      Left lower leg: No swelling. No edema.     Neurological: He is alert.   Skin: Skin is warm and dry. Capillary refill takes less than 2 seconds.   Psychiatric:   Patient examined, seemed to be pacing, responding to internal stimuli.  Bizarre affect. Patient is alert, pacing, intermittently will answer questions           ED Course   Procedures  Labs Reviewed   COMPREHENSIVE METABOLIC PANEL - Abnormal       Result Value    Sodium 137      Potassium 3.6      Chloride 110 (*)     CO2 19 (*)     Glucose 148 (*)     Blood Urea Nitrogen 13.4      Creatinine 0.87      Calcium 9.4      Protein Total 7.5      Albumin 4.4      Globulin 3.1      Albumin/Globulin Ratio 1.4      Bilirubin Total 1.5      ALP 53      ALT 17      AST 32      eGFR >60      Anion Gap 8.0      BUN/Creatinine Ratio 15     URINALYSIS, REFLEX TO URINE CULTURE - Abnormal    Color, UA Yellow      Appearance, UA Clear      Specific Gravity, UA 1.025      pH, UA 6.0      Protein, UA 30 (*)      Glucose, UA Negative      Ketones, UA 15 (*)     Blood, UA Negative      Bilirubin, UA Small (*)     Urobilinogen, UA 0.2      Nitrites, UA Negative      Leukocyte Esterase, UA Negative     ACETAMINOPHEN LEVEL - Abnormal    Acetaminophen Level <3.0 (*)    CBC WITH DIFFERENTIAL - Abnormal    WBC 11.69 (*)     RBC 4.14 (*)     Hgb 13.3 (*)     Hct 38.3 (*)     MCV 92.5      MCH 32.1 (*)     MCHC 34.7      RDW 12.4      Platelet 192      MPV 9.6      Neut % 86.2      Lymph % 7.3      Mono % 6.2      Eos % 0.1      Basophil % 0.1      Lymph # 0.85      Neut # 10.08 (*)     Mono # 0.73      Eos # 0.01      Baso # 0.01      IG# 0.01      IG% 0.1     ALCOHOL,MEDICAL (ETHANOL) - Normal    Ethanol Level <10.0     CBC W/ AUTO DIFFERENTIAL    Narrative:     The following orders were created for panel order CBC auto differential.  Procedure                               Abnormality         Status                     ---------                               -----------         ------                     CBC with Differential[[id number]]       Abnormal            Final result                 Please view results for these tests on the individual orders.   TSH   DRUG SCREEN, URINE (BEAKER)   COVID/FLU A&B PCR   URINALYSIS, MICROSCOPIC          Imaging Results    None          Medications   droPERidol injection 1.25 mg (1.25 mg Intramuscular Given 11/7/24 1240)   diphenhydrAMINE injection 50 mg (50 mg Intramuscular Given 11/7/24 1239)     Medical Decision Making  Patient was a 34-year-old male past medical history bipolar disorder, who presents to the ER for psychiatric evaluation secondary to concerns for psychosis, decreased sleep, worsening hallucinations.      Differential diagnosis includes but not limited to:  Psychosis, bipolar disorder, schizophrenia, substance induced mood disorder    Patient vitals on arrival stable.  Secondary to patient's symptoms, evaluation, PEC was filled.  Medical clearance labs obtained.  Lab  negative for any gross abnormalities.  Patient is at this time medically cleared.  Will continue monitor patient until accepting psychiatric facility is located for placement.    Fabian Roque M.D.  Emergency Medicine        Amount and/or Complexity of Data Reviewed  Labs: ordered.    Risk  Prescription drug management.                                      Clinical Impression:  Final diagnoses:  [Z00.8] Medical clearance for psychiatric admission (Primary)  [F23] Acute psychosis                 Fabian Roque MD  11/07/24 1070

## 2024-11-07 NOTE — ED NOTES
"Pt noted to be standing in corner of room with arms crossed; Reports he's here because his "Legs are sore." Open cute noted to R ankle and ant bites noted to both feet. Speech is slow and slurred; AMS-answers questions inappropriately.  "

## 2024-11-07 NOTE — ED NOTES
Pt accepted to [facility] by Carlyle Almonte NP; number for report: [phone], ETA 1 hour.

## 2024-11-07 NOTE — ED NOTES
Pt mom called requesting an update: Sonya [phone]; Unable to obtain permission from sleeping pt

## 2024-11-08 PROCEDURE — 12400001 HC PSYCH SEMI-PRIVATE ROOM

## 2024-11-08 PROCEDURE — 25000003 PHARM REV CODE 250

## 2024-11-08 PROCEDURE — 25000003 PHARM REV CODE 250: Performed by: PSYCHIATRY & NEUROLOGY

## 2024-11-08 RX ORDER — OLANZAPINE 5 MG/1
10 TABLET, ORALLY DISINTEGRATING ORAL NIGHTLY
Status: DISCONTINUED | OUTPATIENT
Start: 2024-11-08 | End: 2024-11-14 | Stop reason: HOSPADM

## 2024-11-08 RX ORDER — OLANZAPINE 5 MG/1
5 TABLET, ORALLY DISINTEGRATING ORAL ONCE
Status: COMPLETED | OUTPATIENT
Start: 2024-11-08 | End: 2024-11-08

## 2024-11-08 RX ORDER — MUPIROCIN 20 MG/G
OINTMENT TOPICAL 2 TIMES DAILY
Status: DISCONTINUED | OUTPATIENT
Start: 2024-11-08 | End: 2024-11-08

## 2024-11-08 RX ADMIN — TRAZODONE HYDROCHLORIDE 100 MG: 100 TABLET ORAL at 11:11

## 2024-11-08 RX ADMIN — ACETAMINOPHEN 325MG 650 MG: 325 TABLET ORAL at 10:11

## 2024-11-08 RX ADMIN — OLANZAPINE 5 MG: 5 TABLET, ORALLY DISINTEGRATING ORAL at 10:11

## 2024-11-08 RX ADMIN — OLANZAPINE 10 MG: 5 TABLET, ORALLY DISINTEGRATING ORAL at 08:11

## 2024-11-08 RX ADMIN — ACETAMINOPHEN 325MG 650 MG: 325 TABLET ORAL at 05:11

## 2024-11-08 RX ADMIN — HYDROXYZINE HYDROCHLORIDE 50 MG: 50 TABLET, FILM COATED ORAL at 11:11

## 2024-11-08 NOTE — H&P
[facility] - Behavioral Health Unit  History & Physical    Subjective:      Chief Complaint/Reason for Admission: bipolar with psychosis     Oscar Canseco is a 34 y.o. male. Bipolar with psychosis    History reviewed. No pertinent past medical history.  History reviewed. No pertinent surgical history.  No family history on file.  Social History     Tobacco Use    Smoking status: Every Day     Current packs/day: 0.50     Types: Cigarettes    Smokeless tobacco: Never   Substance Use Topics    Alcohol use: Not Currently    Drug use: Not Currently     Types: Opium       PTA Medications   Medication Sig    fluticasone propionate (FLONASE) 50 mcg/actuation nasal spray 1 spray (50 mcg total) by Each Nostril route once daily.    loratadine (CLARITIN) 10 mg tablet Take 1 tablet (10 mg total) by mouth once daily.    OLANZapine (ZYPREXA) 10 MG tablet Take 1 tablet (10 mg total) by mouth every evening.     Review of patient's allergies indicates:  No Known Allergies     Review of Systems   Constitutional: Negative.    HENT: Negative.     Eyes: Negative.    Respiratory: Negative.     Cardiovascular: Negative.    Gastrointestinal: Negative.    Genitourinary: Negative.    Musculoskeletal: Negative.    Skin: Negative.    Neurological: Negative.    Endo/Heme/Allergies: Negative.    Psychiatric/Behavioral:  Negative for depression, hallucinations, substance abuse and suicidal ideas. The patient is nervous/anxious.        Objective:      Vital Signs (Most Recent)  Temp: 98.1 °F (36.7 °C) (11/08/24 1101)  Pulse: 89 (11/08/24 1101)  Resp: 19 (11/08/24 1101)  BP: 119/76 (11/08/24 1101)  SpO2: 100 % (11/08/24 1101)    Vital Signs Range (Last 24H):  Temp:  [97.8 °F (36.6 °C)-98.6 °F (37 °C)]   Pulse:  [78-89]   Resp:  [18-20]   BP: (113-144)/(66-91)   SpO2:  [97 %-100 %]     Physical Exam  HENT:      Head: Normocephalic.      Right Ear: Tympanic membrane normal.      Left Ear: Tympanic membrane normal.      Nose: Nose  normal.      Mouth/Throat:      Mouth: Mucous membranes are moist.   Eyes:      Extraocular Movements: Extraocular movements intact.      Pupils: Pupils are equal, round, and reactive to light.   Cardiovascular:      Rate and Rhythm: Normal rate and regular rhythm.   Pulmonary:      Effort: Pulmonary effort is normal.   Abdominal:      General: Abdomen is flat.   Musculoskeletal:         General: Normal range of motion.   Skin:     General: Skin is warm.   Neurological:      General: No focal deficit present.      Mental Status: He is alert and oriented to person, place, and time.      Comments: Vision normal   Hearing normal   EOM intact   Face muscles normal  Facial sensation normal   Shrugs shoulders  Tongue midline            Data Review:    Recent Results (from the past 48 hours)   Comprehensive metabolic panel    Collection Time: 11/07/24 12:41 PM   Result Value Ref Range    Sodium 137 136 - 145 mmol/L    Potassium 3.6 3.5 - 5.1 mmol/L    Chloride 110 (H) 98 - 107 mmol/L    CO2 19 (L) 22 - 29 mmol/L    Glucose 148 (H) 74 - 100 mg/dL    Blood Urea Nitrogen 13.4 8.9 - 20.6 mg/dL    Creatinine 0.87 0.72 - 1.25 mg/dL    Calcium 9.4 8.4 - 10.2 mg/dL    Protein Total 7.5 6.4 - 8.3 gm/dL    Albumin 4.4 3.5 - 5.0 g/dL    Globulin 3.1 2.4 - 3.5 gm/dL    Albumin/Globulin Ratio 1.4 1.1 - 2.0 ratio    Bilirubin Total 1.5 <=1.5 mg/dL    ALP 53 40 - 150 unit/L    ALT 17 0 - 55 unit/L    AST 32 5 - 34 unit/L    eGFR >60 mL/min/1.73/m2    Anion Gap 8.0 mEq/L    BUN/Creatinine Ratio 15    TSH    Collection Time: 11/07/24 12:41 PM   Result Value Ref Range    TSH 0.776 0.350 - 4.940 uIU/mL   Ethanol    Collection Time: 11/07/24 12:41 PM   Result Value Ref Range    Ethanol Level <10.0 <=10.0 mg/dL   Acetaminophen level    Collection Time: 11/07/24 12:41 PM   Result Value Ref Range    Acetaminophen Level <3.0 (L) 10.0 - 30.0 ug/ml   CBC with Differential    Collection Time: 11/07/24 12:41 PM   Result Value Ref Range    WBC 11.69  (H) 4.50 - 11.50 x10(3)/mcL    RBC 4.14 (L) 4.70 - 6.10 x10(6)/mcL    Hgb 13.3 (L) 14.0 - 18.0 g/dL    Hct 38.3 (L) 42.0 - 52.0 %    MCV 92.5 80.0 - 94.0 fL    MCH 32.1 (H) 27.0 - 31.0 pg    MCHC 34.7 33.0 - 36.0 g/dL    RDW 12.4 11.5 - 17.0 %    Platelet 192 130 - 400 x10(3)/mcL    MPV 9.6 7.4 - 10.4 fL    Neut % 86.2 %    Lymph % 7.3 %    Mono % 6.2 %    Eos % 0.1 %    Basophil % 0.1 %    Lymph # 0.85 0.6 - 4.6 x10(3)/mcL    Neut # 10.08 (H) 2.1 - 9.2 x10(3)/mcL    Mono # 0.73 0.1 - 1.3 x10(3)/mcL    Eos # 0.01 0 - 0.9 x10(3)/mcL    Baso # 0.01 <=0.2 x10(3)/mcL    IG# 0.01 0 - 0.04 x10(3)/mcL    IG% 0.1 %   Urinalysis, Reflex to Urine Culture    Collection Time: 11/07/24 12:49 PM    Specimen: Urine   Result Value Ref Range    Color, UA Yellow Yellow, Light-Yellow, Dark Yellow, Lesly, Straw    Appearance, UA Clear Clear    Specific Gravity, UA 1.025 1.005 - 1.030    pH, UA 6.0 5.0 - 8.5    Protein, UA 30 (A) Negative    Glucose, UA Negative Negative, Normal    Ketones, UA 15 (A) Negative    Blood, UA Negative Negative    Bilirubin, UA Small (A) Negative    Urobilinogen, UA 0.2 0.2, 1.0, Normal    Nitrites, UA Negative Negative    Leukocyte Esterase, UA Negative Negative   Drug Screen, Urine    Collection Time: 11/07/24 12:49 PM   Result Value Ref Range    Amphetamines, Urine Negative Negative    Barbiturates, Urine Negative Negative    Benzodiazepine, Urine Negative Negative    Cannabinoids, Urine Positive (A) Negative    Cocaine, Urine Negative Negative    Opiates, Urine Negative Negative    Phencyclidine, Urine Negative Negative    Fentanyl, Urine Negative Negative    pH, Urine 6.0 3.0 - 11.0    Specific Gravity, Urine Auto 1.025 1.001 - 1.035   Urinalysis, Microscopic    Collection Time: 11/07/24 12:49 PM   Result Value Ref Range    Bacteria, UA None Seen None Seen, Rare, Occasional /HPF    RBC, UA None Seen None Seen, 0-2, 3-5, 0-5 /HPF    WBC, UA 0-2 None Seen, 0-2, 3-5, 0-5 /HPF    Squamous Epithelial Cells,  UA None Seen None Seen, Rare, Occasional, Occ /HPF   COVID/FLU A&B PCR    Collection Time: 11/07/24 12:54 PM   Result Value Ref Range    Influenza A PCR Not Detected Not Detected    Influenza B PCR Not Detected Not Detected    SARS-CoV-2 PCR Not Detected Not Detected, Negative        No results found.       Assessment and Plan       Bipolar with psychosis

## 2024-11-08 NOTE — NURSING
At 1930 pt seen pacing and c/o restlessness. Administered PRN trazodone at this time.     At 2030 pt seen resting quietly in bed with eyes closed. Respirations even/unlabored. No signs of distress or discomfort noted at this time.

## 2024-11-08 NOTE — PROGRESS NOTES
"Oscar is a 36y/o male admitted for Bipolar I Disorder, Most Recent Episode Manic: 6.7.4.Severe With Psychotic Features (F31.2) with a uds +cannabis. CTRS met with Pt 1:1, Oscar reported admission as I was worrying my Mom and she sent me to the hospital. Oscar appeared manic and paranoid AEB hypervigilance, hyper focused and crying about his dog being "by herself and no one to care for her" while he's at [facility], confused, expansive, and labile, able to be redirected to stay on topic, and reported ability to perform his ADL's. CTRS educated Pt to TR group times and dates, with Oscar agreeing to attend TR groups. Oscar reported his treatment goal as Social skills, I need to learn how to talk and be around people.       11/08/24 1050   General   Admit Date 11/07/24   Primary Diagnosis Bipolar I Disorder, Most Recent Episode Manic: 6.7.4.Severe With Psychotic Features (F31.2)   [employer] spiritual   Number of Children 0   Children Living? 0   Occupation unemployed on SSDI   Does the patient have dentures? No   If you were to take part in activities, which of the following would you prefer? Activities that you do alone   Do you feel like you have enough to keep you busy now? Yes   Do you believe that you have the opportunity for physical activity? Yes   Activity Capabilities Maximum   Subjective   Patient states I was worrying my Mom and she sent me to the hospital   Assessment   Mobility ambulates independently   Transfers independently   Musculoskeletal   (none)   Visual Acuity normal vision   Visual Perception depth perception;color perception;recognizes letters;recognizes numbers   Hearing normal   Speech/Communication normal   Cognitive Concerns oriented x4;attention span;concentration;problem solving   Emotional Concerns appears depressed;appears anxious  (appears manic and paranoid)   Leisure Interest Survey   Leisure Interest Survey Yes   Solitary Activities   Computer Activities Current Interest   Watching Videos " Current Interest   Music Listening Current Interest   Reading Current Interest   Physical Activities   Walk/Run Current Interest   Creative Activities   Photography Current Interest   Outdoor Activities   Fishing Current Interest   Camping Current Interest   Water Sports Current Interest   Hiking Current Interest   Spectator Events   Movies Current Interest   Passive Games   Trivia Games Current Interest   Classic Board Games Current Interest   Card Games Current Interest   Goals   Additional Documentation yes   Goal Formulation With patient   Time For Goal Achievement 7 days   Goal 1 Social skills, I need to learn how to talk and be around people   Goal 1-Progress ongoing   Plan   Planned Therapy Intervention Group Recreational Therapy   Expected Length of Stay 5-7days   PT Frequency Minimum of 3 visits per week

## 2024-11-08 NOTE — PLAN OF CARE
Problem: Adult Behavioral Health Plan of Care  Goal: Plan of Care Review  Outcome: Not Progressing  Flowsheets (Taken 11/8/2024 0830)  Patient Agreement with Plan of Care: agrees  Plan of Care Reviewed With: patient  Goal: Patient-Specific Goal (Individualization)  Outcome: Not Progressing  Flowsheets (Taken 11/8/2024 0830)  Patient Personal Strengths: independent living skills  Patient Vulnerabilities: substance abuse/addiction  Goal: Adheres to Safety Considerations for Self and Others  Outcome: Not Progressing  Flowsheets (Taken 11/8/2024 0830)  Adheres to Safety Considerations for Self and Others: unable to achieve outcome  Intervention: Develop and Maintain Individualized Safety Plan  Flowsheets (Taken 11/8/2024 0830)  Safety Measures: safety rounds completed  Goal: Absence of New-Onset Illness or Injury  Outcome: Not Progressing  Intervention: Identify and Manage Fall Risk  Flowsheets (Taken 11/8/2024 0830)  Safety Measures: safety rounds completed  Intervention: Prevent VTE (Venous Thromboembolism)  Flowsheets (Taken 11/8/2024 0830)  VTE Prevention/Management: fluids promoted  Intervention: Prevent Infection  Flowsheets (Taken 11/8/2024 0830)  Infection Prevention: hand hygiene promoted  Goal: Optimized Coping Skills in Response to Life Stressors  Outcome: Not Progressing  Flowsheets (Taken 11/8/2024 0830)  Optimized Coping Skills in Response to Life Stressors: unable to achieve outcome  Intervention: Promote Effective Coping Strategies  Flowsheets (Taken 11/8/2024 0830)  Supportive Measures: self-care encouraged  Goal: Develops/Participates in Therapeutic [place] to Support Successful Transition  Outcome: Not Progressing  Flowsheets (Taken 11/8/2024 0830)  Develops/Participates in Therapeutic [place] to Support Successful Transition: unable to achieve outcome  Intervention: Foster Therapeutic [place]  Flowsheets (Taken 11/8/2024 0830)  Trust Relationship/Rapport: care explained  Goal: Rounds/Family  Conference  Outcome: Not Progressing  Flowsheets (Taken 11/8/2024 0830)  Participants:   milieu/psych techs   nursing     Problem: Psychotic Signs/Symptoms  Goal: Improved Behavioral Control (Psychotic Signs/Symptoms)  Outcome: Not Progressing  Flowsheets (Taken 11/8/2024 0830)  Mutually Determined Action Steps (Improved Behavioral Control): identifies symptoms triggers  Intervention: Manage Behavior  Flowsheets (Taken 11/8/2024 0830)  De-Escalation Techniques: quiet time facilitated  Goal: Optimal Cognitive Function (Psychotic Signs/Symptoms)  Outcome: Not Progressing  Flowsheets (Taken 11/8/2024 0830)  Mutually Determined Action Steps (Optimal Cognitive Function): participates in attention training  Intervention: Support and Promote Cognitive Ability  Flowsheets (Taken 11/8/2024 0830)  Trust Relationship/Rapport: care explained  Goal: Increased Participation and Engagement (Psychotic Signs/Symptoms)  Outcome: Not Progressing  Flowsheets (Taken 11/8/2024 0830)  Mutually Determined Action Steps (Increased Participation and Engagement): identifies symptoms triggers  Intervention: Facilitate Participation and Engagement  Flowsheets (Taken 11/8/2024 0830)  Supportive Measures: self-care encouraged  Diversional Activity: television  Goal: Improved Mood Symptoms (Psychotic Signs/Symptoms)  Outcome: Not Progressing  Flowsheets (Taken 11/8/2024 0830)  Mutually Determined Action Steps (Improved Mood Symptoms): acknowledges progress  Intervention: Optimize Emotion and Mood  Flowsheets (Taken 11/8/2024 0830)  Supportive Measures: self-care encouraged  Diversional Activity: television  Goal: Improved Psychomotor Symptoms (Psychotic Signs/Symptoms)  Outcome: Not Progressing  Flowsheets (Taken 11/8/2024 0830)  Mutually Determined Action Steps (Improved Psychomotor Symptoms): exhibits decrease in agitation  Intervention: Manage Psychomotor Movement  Flowsheets (Taken 11/8/2024 0830)  Activity (Behavioral Health): up ad  duncan  Patient Performed Hygiene: teeth brushed  Diversional Activity: television  Goal: Decreased Sensory Symptoms (Psychotic Signs/Symptoms)  Outcome: Not Progressing  Flowsheets (Taken 11/8/2024 0830)  Mutually Determined Action Steps (Decreased Sensory Symptoms): shares insight re: need for meds  Intervention: Minimize and Manage Sensory Impairment  Flowsheets (Taken 11/8/2024 0830)  Sensory Stimulation Regulation: quiet environment promoted  Goal: Improved Sleep (Psychotic Signs/Symptoms)  Outcome: Not Progressing  Flowsheets (Taken 11/8/2024 0830)  Mutually Determined Action Steps (Improved Sleep): sleeps 4-6 hours at night  Intervention: Promote Healthy Sleep Hygiene  Flowsheets (Taken 11/8/2024 0830)  Sleep Hygiene Promotion: regular sleep pattern promoted  Goal: Enhanced Social, Occupational or Functional Skills (Psychotic Signs/Symptoms)  Outcome: Not Progressing  Flowsheets (Taken 11/8/2024 0830)  Mutually Determined Action Steps (Enhanced Social, Occupational or Functional Skills): participates in social skills training  Intervention: Promote Social, Occupational and Functional Ability  Flowsheets (Taken 11/8/2024 0830)  Trust Relationship/Rapport: care explained  Social Functional Ability Promotion: autonomy promoted     Problem: Excessive Substance Use  Goal: Optimized Energy Level (Excessive Substance Use)  Outcome: Not Progressing  Flowsheets (Taken 11/8/2024 0830)  Mutually Determined Action Steps (Optimized Energy Level): grooms self without prompting  Intervention: Optimize Energy Level  Flowsheets (Taken 11/8/2024 0830)  Activity (Behavioral Health): up ad duncan  Patient Performed Hygiene: teeth brushed  Diversional Activity: television  Goal: Improved Behavioral Control (Excessive Substance Use)  Outcome: Not Progressing  Flowsheets (Taken 11/8/2024 0830)  Mutually Determined Action Steps (Improved Behavioral Control): identifies major stressors  Intervention: Promote Behavior and Impulse  Control  Flowsheets (Taken 11/8/2024 0830)  Behavior Management: behavioral plan reviewed  Goal: Increased Participation and Engagement (Excessive Substance Use)  Outcome: Not Progressing  Flowsheets (Taken 11/8/2024 0830)  Mutually Determined Action Steps (Increased Participation and Engagement): discusses ongoing recovery plan  Intervention: Facilitate Participation and Engagement  Flowsheets (Taken 11/8/2024 0830)  Supportive Measures: self-care encouraged  Diversional Activity: television  Goal: Improved Physiologic Symptoms (Excessive Substance Use)  Outcome: Not Progressing  Flowsheets (Taken 11/8/2024 0830)  Mutually Determined Action Steps (Improved Physiologic Symptoms): discusses use pattern  Intervention: Optimize Physiologic Function  Flowsheets (Taken 11/8/2024 0830)  Oral Nutrition Promotion: rest periods promoted  Nutrition Interventions: food preferences provided  Goal: Enhanced Social, Occupational or Functional Skills (Excessive Substance Use)  Outcome: Not Progressing  Flowsheets (Taken 11/8/2024 0830)  Mutually Determined Action Steps (Enhanced Social, Occupational or Functional Skills): participates in social skills training  Intervention: Promote Social, Occupational and Functional Ability  Flowsheets (Taken 11/8/2024 0830)  Trust Relationship/Rapport: care explained  Social Functional Ability Promotion: autonomy promoted    He is AAO X 4. Flat affect and blunted mood. Anxious and irritable. He is observed to be pacing in the terrell and talking to himself. Guarded. Suspicious. Minimal self disclosure offered. Paranoia noted when he is near other patients. Lacks an insight into his illness. He will meet with NP today and Psyche Eval will be completed. Fair eye contact noted. Speech normal tone and speed. Continue plan of care and provide a safe and therapeutic environment. Continue to monitor every fifteen minutes for safety.

## 2024-11-08 NOTE — NURSING
Admission Note:    Oscar Canseco is a 34 y.o. male, : 1990, MRN: [id number], admitted on 2024 to [facility] ([facility]) for Wil Virgen MD with a diagnosis of Psychosis [F29]. Patient admitted on a status of Physician Emergency Certificate (PEC). Oscar reports no known food or drug allergies.    Patient demonstrated an affect that was anxious, irritable, agitated, angry, and  labile. Patient demonstrated mood during assessment that was angry, anxious, and swings. Patient had an appearance that was disheveled.  Patient denies suicidal ideation. Patient denies suicide plan. Patient endorses hallucinations. He would not state what he is hearing or seeing. Paranoia noted when near patients and staff. He was brought to the ED per police and family due to erratic behavior.    Oscar's  height is 6' (1.829 m) and weight is 85.3 kg (188 lb). His temperature is 98.6 °F (37 °C). His blood pressure is 144/91 (abnormal) and his pulse is 78. His respiration is 18.     Oscar's last BM was noted on: 24.    Metal detector screening performed via security personnel. The result of the scan was no contraband found. Head-to-toe physical assessment completed with the following findings: ant bites to bilateral feet found upon body screen. A full skin assessment was performed. Oscar's skin appeared _______.  Oscar was oriented to unit, staff, peers, and room. Patient belongings/valuables stored in locked intake room cabinet and changes of clothing provided to patient. Oscar was placed on Q 15 min observations.

## 2024-11-08 NOTE — PROGRESS NOTES
11/08/24 1400   [facility] Group Therapy   Group Name Therapeutic Recreation   Specific Interventions Skilled Activity Leisure Education and Awareness   Participation Level None   Participation Quality Refused  (despite encouragment)

## 2024-11-08 NOTE — PROGRESS NOTES
Unable to tolerated group 2* psychosis and manic state.   11/08/24 1000   [facility] Group Therapy   Group Name Therapeutic Recreation   Specific Interventions Skilled Activity Leisure Education and Awareness   Participation Level None   Participation Quality Lack of Interest;Refused

## 2024-11-08 NOTE — PLAN OF CARE
Problem: Adult Behavioral Health Plan of Care  Goal: Plan of Care Review  Outcome: Not Progressing  Flowsheets (Taken 11/7/2024 1817)  Patient Agreement with Plan of Care: agrees  Plan of Care Reviewed With: patient  Goal: Patient-Specific Goal (Individualization)  Outcome: Not Progressing  Flowsheets (Taken 11/7/2024 1817)  Patient Personal Strengths: independent living skills  Patient Vulnerabilities: substance abuse/addiction  Goal: Adheres to Safety Considerations for Self and Others  Outcome: Not Progressing  Flowsheets (Taken 11/7/2024 1817)  Adheres to Safety Considerations for Self and Others: unable to achieve outcome  Intervention: Develop and Maintain Individualized Safety Plan  Flowsheets (Taken 11/7/2024 1817)  Safety Measures: safety rounds completed  Goal: Absence of New-Onset Illness or Injury  Outcome: Not Progressing  Intervention: Identify and Manage Fall Risk  Flowsheets (Taken 11/7/2024 1817)  Safety Measures: safety rounds completed  Intervention: Prevent VTE (Venous Thromboembolism)  Flowsheets (Taken 11/7/2024 1817)  VTE Prevention/Management: fluids promoted  Intervention: Prevent Infection  Flowsheets (Taken 11/7/2024 1817)  Infection Prevention: rest/sleep promoted  Goal: Optimized Coping Skills in Response to Life Stressors  Outcome: Not Progressing  Flowsheets (Taken 11/7/2024 1817)  Optimized Coping Skills in Response to Life Stressors: unable to achieve outcome  Intervention: Promote Effective Coping Strategies  Flowsheets (Taken 11/7/2024 1817)  Supportive Measures: self-care encouraged  Goal: Develops/Participates in Therapeutic [place] to Support Successful Transition  Outcome: Not Progressing  Flowsheets (Taken 11/7/2024 1817)  Develops/Participates in Therapeutic [place] to Support Successful Transition: unable to achieve outcome  Intervention: Foster Therapeutic [place]  Flowsheets (Taken 11/7/2024 1817)  Trust Relationship/Rapport: care explained  Goal: Rounds/Family  Conference  Outcome: Not Progressing  Flowsheets (Taken 11/7/2024 1817)  Participants:   milieu/psych techs   nursing     Problem: Psychotic Signs/Symptoms  Goal: Improved Behavioral Control (Psychotic Signs/Symptoms)  Outcome: Not Progressing  Flowsheets (Taken 11/7/2024 1817)  Mutually Determined Action Steps (Improved Behavioral Control): identifies symptoms triggers  Intervention: Manage Behavior  Flowsheets (Taken 11/7/2024 1817)  De-Escalation Techniques: quiet time facilitated  Goal: Optimal Cognitive Function (Psychotic Signs/Symptoms)  Outcome: Not Progressing  Flowsheets (Taken 11/7/2024 1817)  Mutually Determined Action Steps (Optimal Cognitive Function): participates in attention training  Intervention: Support and Promote Cognitive Ability  Flowsheets (Taken 11/7/2024 1817)  Trust Relationship/Rapport: care explained  Goal: Increased Participation and Engagement (Psychotic Signs/Symptoms)  Outcome: Not Progressing  Flowsheets (Taken 11/7/2024 1817)  Mutually Determined Action Steps (Increased Participation and Engagement): identifies symptoms triggers  Intervention: Facilitate Participation and Engagement  Flowsheets (Taken 11/7/2024 1817)  Supportive Measures: self-care encouraged  Diversional Activity: television  Goal: Improved Mood Symptoms (Psychotic Signs/Symptoms)  Outcome: Not Progressing  Flowsheets (Taken 11/7/2024 1817)  Mutually Determined Action Steps (Improved Mood Symptoms): acknowledges progress  Intervention: Optimize Emotion and Mood  Flowsheets (Taken 11/7/2024 1817)  Supportive Measures: self-care encouraged  Diversional Activity: television  Goal: Improved Psychomotor Symptoms (Psychotic Signs/Symptoms)  Outcome: Not Progressing  Flowsheets (Taken 11/7/2024 1817)  Mutually Determined Action Steps (Improved Psychomotor Symptoms): exhibits decrease in agitation  Intervention: Manage Psychomotor Movement  Flowsheets (Taken 11/7/2024 1817)  Activity (Behavioral Health): up ad  duncan  Patient Performed Hygiene: teeth brushed  Diversional Activity: television  Goal: Decreased Sensory Symptoms (Psychotic Signs/Symptoms)  Outcome: Not Progressing  Flowsheets (Taken 11/7/2024 1817)  Mutually Determined Action Steps (Decreased Sensory Symptoms): adheres to medication regimen  Intervention: Minimize and Manage Sensory Impairment  Flowsheets (Taken 11/7/2024 1817)  Sensory Stimulation Regulation: quiet environment promoted  Goal: Improved Sleep (Psychotic Signs/Symptoms)  Outcome: Not Progressing  Flowsheets (Taken 11/7/2024 1817)  Mutually Determined Action Steps (Improved Sleep): sleeps 4-6 hours at night  Intervention: Promote Healthy Sleep Hygiene  Flowsheets (Taken 11/7/2024 1817)  Sleep Hygiene Promotion: regular sleep pattern promoted  Goal: Enhanced Social, Occupational or Functional Skills (Psychotic Signs/Symptoms)  Outcome: Not Progressing  Flowsheets (Taken 11/7/2024 1817)  Mutually Determined Action Steps (Enhanced Social, Occupational or Functional Skills): participates in social skills training  Intervention: Promote Social, Occupational and Functional Ability  Flowsheets (Taken 11/7/2024 1817)  Trust Relationship/Rapport: care explained  Social Functional Ability Promotion: autonomy promoted     Problem: Excessive Substance Use  Goal: Optimized Energy Level (Excessive Substance Use)  Outcome: Not Progressing  Flowsheets (Taken 11/7/2024 1817)  Mutually Determined Action Steps (Optimized Energy Level): grooms self without prompting  Intervention: Optimize Energy Level  Flowsheets (Taken 11/7/2024 1817)  Activity (Behavioral Health): up ad duncan  Patient Performed Hygiene: teeth brushed  Diversional Activity: television  Goal: Improved Behavioral Control (Excessive Substance Use)  Outcome: Not Progressing  Flowsheets (Taken 11/7/2024 1817)  Mutually Determined Action Steps (Improved Behavioral Control): identifies major stressors  Intervention: Promote Behavior and Impulse  Control  Flowsheets (Taken 11/7/2024 1817)  Behavior Management: behavioral plan developed  Goal: Increased Participation and Engagement (Excessive Substance Use)  Outcome: Not Progressing  Flowsheets (Taken 11/7/2024 1817)  Mutually Determined Action Steps (Increased Participation and Engagement): discusses ongoing recovery plan  Intervention: Facilitate Participation and Engagement  Flowsheets (Taken 11/7/2024 1817)  Supportive Measures: self-care encouraged  Diversional Activity: television  Goal: Improved Physiologic Symptoms (Excessive Substance Use)  Outcome: Not Progressing  Flowsheets (Taken 11/7/2024 1817)  Mutually Determined Action Steps (Improved Physiologic Symptoms): discusses use pattern  Intervention: Optimize Physiologic Function  Flowsheets (Taken 11/7/2024 1817)  Oral Nutrition Promotion: rest periods promoted  Nutrition Interventions: food preferences provided  Goal: Enhanced Social, Occupational or Functional Skills (Excessive Substance Use)  Outcome: Not Progressing  Flowsheets (Taken 11/7/2024 1817)  Mutually Determined Action Steps (Enhanced Social, Occupational or Functional Skills): participates in social skills training  Intervention: Promote Social, Occupational and Functional Ability  Flowsheets (Taken 11/7/2024 1817)  Trust Relationship/Rapport: care explained  Social Functional Ability Promotion: autonomy promoted
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
## 2024-11-08 NOTE — NURSING
Pt pacing hallways, responding to internal stimulation, tearful at times, paranoid around peers and staff. Pt offered and accepted oral PRN medications for racing thoughts and mood. P.O. Ativan/Benadryl/Haldol given without incident.

## 2024-11-08 NOTE — H&P
11/8/2024  Oscar Canseco   1990   [id number]            Psychiatry Inpatient Admission Note    Date of Admission: 11/7/2024  6:07 PM    Current Legal Status: Physician's Emergency Certificate    Chief Complaint: Heidi    SUBJECTIVE:   History of Present Illness:   Oscar Canseco is a 34-year-old male, was placed under a PEC at [facility] following presentation by his family due to psychosis, decreased sleep, and worsening hallucinations. The family reported noncompliance with medications, although the patient asserts he has been compliant.     Staff noted manic symptoms and unsafe behaviors last night, leading to the administration of an oral PRN, which the patient accepted and appeared to tolerate well. This morning, he presented to the exam room calm and cooperative, though displaying signs of paranoia, evidenced by physical tension and hypervigilance when individuals walked past the room. While attentive to the conversation, he remained oddly observant of his surroundings.    The patient endorses a history of bipolar disorder with psychotic features. He reports previous use of Seroquel at 100 mg, which he found ineffective, and recalls that Zyprexa Zydis was particularly helpful in preventing manic episodes and managing insomnia. The patient states that he has been without sleep for four days and denies experiencing hallucinations today. He describes his manic episodes as times of heightened awareness, during which he perceives hearing people from afar.    Given his reported history and current presentation, I will restart Zyprexa QHS, providing an additional one-time dose now to address his anxiety, paranoia, and continued elevation in mood. He will be admitted for medication management and safety monitoring.        Past Psychiatric History:   Previous Psychiatric Hospitalizations: Multiple   Previous Medication Trials: Multiple  Previous Suicide Attempts: Denies   Outpatient psychiatrist: [facility]    Current Medications:   Home Psychiatric Meds: Seroquel    Past Medical/Surgical History:   History reviewed. No pertinent past medical history.  History reviewed. No pertinent surgical history.    Family Psychiatric History:   Grandmother      Allergies:   Review of patient's allergies indicates:  No Known Allergies    Substance Abuse History:   Tobacco: 1 PPD + 2 Cigars  Alcohol: Beer or two once a week  Illicit Substances: THC  Treatment: Yes        Scheduled Meds:    nicotine  1 patch Transdermal Daily      PRN Meds:   Current Facility-Administered Medications:     acetaminophen, 650 mg, Oral, Q6H PRN    aluminum-magnesium hydroxide-simethicone, 30 mL, Oral, Q6H PRN    haloperidoL, 10 mg, Oral, Q4H PRN **AND** diphenhydrAMINE, 50 mg, Oral, Q4H PRN **AND** LORazepam, 2 mg, Oral, Q4H PRN **AND** haloperidol lactate, 10 mg, Intramuscular, Q4H PRN **AND** diphenhydrAMINE, 50 mg, Intramuscular, Q4H PRN **AND** lorazepam, 2 mg, Intramuscular, Q4H PRN    hydrOXYzine HCL, 50 mg, Oral, Q4H PRN    magnesium hydroxide 400 mg/5 ml, 30 mL, Oral, Daily PRN    ondansetron, 4 mg, Oral, Q6H PRN    traZODone, 100 mg, Oral, Nightly PRN   Psychotherapeutics (From admission, onward)      Start     Stop Route Frequency Ordered    11/07/24 1813  haloperidoL tablet 10 mg  (Med - Acute  Behavioral Management)        Placed in "And" Linked Group    -- Oral Every 4 hours PRN 11/07/24 1813    11/07/24 1813  LORazepam tablet 2 mg  (Med - Acute  Behavioral Management)        Placed in "And" Linked Group    -- Oral Every 4 hours PRN 11/07/24 1813    11/07/24 1813  haloperidol lactate injection 10 mg  (Med - Acute  Behavioral Management)        Placed in "And" Linked Group    -- IM Every 4 hours PRN 11/07/24 1813    11/07/24 1813  LORazepam injection 2 mg  (Med - Acute  Behavioral Management)        Placed in "And" Linked Group    -- IM Every 4 hours PRN 11/07/24 1813    11/07/24 1813  traZODone tablet 100 mg         " -- Oral Nightly PRN 11/07/24 1813              Social History:  Housing Status: Lives in camper at friends house  Relationship Status/Sexual Orientation: Single   Children: Denies  Education: High school   Employment Status/Info: Disability    history: Denies  History of physical/sexual abuse: Denies   Access to gun: Denies       Legal History:   Past Charges/Incarcerations: Yes   Pending charges: Denies      OBJECTIVE:   Medical Review Of Systems:  A comprehensive review of systems was negative.    Vitals   Vitals:    11/07/24 1807   BP: (!) 144/91   Pulse: 78   Resp: 18   Temp: 98.6 °F (37 °C)        Labs/Imaging/Studies:   Recent Results (from the past 48 hours)   Comprehensive metabolic panel    Collection Time: 11/07/24 12:41 PM   Result Value Ref Range    Sodium 137 136 - 145 mmol/L    Potassium 3.6 3.5 - 5.1 mmol/L    Chloride 110 (H) 98 - 107 mmol/L    CO2 19 (L) 22 - 29 mmol/L    Glucose 148 (H) 74 - 100 mg/dL    Blood Urea Nitrogen 13.4 8.9 - 20.6 mg/dL    Creatinine 0.87 0.72 - 1.25 mg/dL    Calcium 9.4 8.4 - 10.2 mg/dL    Protein Total 7.5 6.4 - 8.3 gm/dL    Albumin 4.4 3.5 - 5.0 g/dL    Globulin 3.1 2.4 - 3.5 gm/dL    Albumin/Globulin Ratio 1.4 1.1 - 2.0 ratio    Bilirubin Total 1.5 <=1.5 mg/dL    ALP 53 40 - 150 unit/L    ALT 17 0 - 55 unit/L    AST 32 5 - 34 unit/L    eGFR >60 mL/min/1.73/m2    Anion Gap 8.0 mEq/L    BUN/Creatinine Ratio 15    TSH    Collection Time: 11/07/24 12:41 PM   Result Value Ref Range    TSH 0.776 0.350 - 4.940 uIU/mL   Ethanol    Collection Time: 11/07/24 12:41 PM   Result Value Ref Range    Ethanol Level <10.0 <=10.0 mg/dL   Acetaminophen level    Collection Time: 11/07/24 12:41 PM   Result Value Ref Range    Acetaminophen Level <3.0 (L) 10.0 - 30.0 ug/ml   CBC with Differential    Collection Time: 11/07/24 12:41 PM   Result Value Ref Range    WBC 11.69 (H) 4.50 - 11.50 x10(3)/mcL    RBC 4.14 (L) 4.70 - 6.10 x10(6)/mcL    Hgb 13.3 (L) 14.0 - 18.0 g/dL    Hct 38.3 (L)  42.0 - 52.0 %    MCV 92.5 80.0 - 94.0 fL    MCH 32.1 (H) 27.0 - 31.0 pg    MCHC 34.7 33.0 - 36.0 g/dL    RDW 12.4 11.5 - 17.0 %    Platelet 192 130 - 400 x10(3)/mcL    MPV 9.6 7.4 - 10.4 fL    Neut % 86.2 %    Lymph % 7.3 %    Mono % 6.2 %    Eos % 0.1 %    Basophil % 0.1 %    Lymph # 0.85 0.6 - 4.6 x10(3)/mcL    Neut # 10.08 (H) 2.1 - 9.2 x10(3)/mcL    Mono # 0.73 0.1 - 1.3 x10(3)/mcL    Eos # 0.01 0 - 0.9 x10(3)/mcL    Baso # 0.01 <=0.2 x10(3)/mcL    IG# 0.01 0 - 0.04 x10(3)/mcL    IG% 0.1 %   Urinalysis, Reflex to Urine Culture    Collection Time: 11/07/24 12:49 PM    Specimen: Urine   Result Value Ref Range    Color, UA Yellow Yellow, Light-Yellow, Dark Yellow, Lesly, Straw    Appearance, UA Clear Clear    Specific Gravity, UA 1.025 1.005 - 1.030    pH, UA 6.0 5.0 - 8.5    Protein, UA 30 (A) Negative    Glucose, UA Negative Negative, Normal    Ketones, UA 15 (A) Negative    Blood, UA Negative Negative    Bilirubin, UA Small (A) Negative    Urobilinogen, UA 0.2 0.2, 1.0, Normal    Nitrites, UA Negative Negative    Leukocyte Esterase, UA Negative Negative   Drug Screen, Urine    Collection Time: 11/07/24 12:49 PM   Result Value Ref Range    Amphetamines, Urine Negative Negative    Barbiturates, Urine Negative Negative    Benzodiazepine, Urine Negative Negative    Cannabinoids, Urine Positive (A) Negative    Cocaine, Urine Negative Negative    Opiates, Urine Negative Negative    Phencyclidine, Urine Negative Negative    Fentanyl, Urine Negative Negative    pH, Urine 6.0 3.0 - 11.0    Specific Gravity, Urine Auto 1.025 1.001 - 1.035   Urinalysis, Microscopic    Collection Time: 11/07/24 12:49 PM   Result Value Ref Range    Bacteria, UA None Seen None Seen, Rare, Occasional /HPF    RBC, UA None Seen None Seen, 0-2, 3-5, 0-5 /HPF    WBC, UA 0-2 None Seen, 0-2, 3-5, 0-5 /HPF    Squamous Epithelial Cells, UA None Seen None Seen, Rare, Occasional, Occ /HPF   COVID/FLU A&B PCR    Collection Time: 11/07/24 12:54 PM  "  Result Value Ref Range    Influenza A PCR Not Detected Not Detected    Influenza B PCR Not Detected Not Detected    SARS-CoV-2 PCR Not Detected Not Detected, Negative      No results found for: "PHENYTOIN", "PHENOBARB", "VALPROATE", "CBMZ"        Psychiatric Mental Status Exam:  General Appearance: appears stated age, well developed and nourished, adequately groomed and appropriately dressed, in no acute distress  Arousal: alert  Behavior: normal; cooperative; reasonably friendly, pleasant, and polite; appropriate eye-contact; under good behavioral control  Movements and Motor Activity: no abnormal involuntary movements noted; no tics, no tremors, no akathisia, no dystonia, no evidence of tardive dyskinesia; no psychomotor agitation or retardation  Orientation: oriented to person, place, time, and situation  Speech: intact; normal rate, rhythm, volume, tone and pitch; conversational, spontaneous, and coherent  Mood: Expansive  Affect: labile  Thought Process: intact, linear, goal-directed, organized, logical  Associations: intact, no loosening of associations  Thought Content and Perceptions: no suicidal or homicidal ideation, no auditory or visual hallucinations, no paranoid ideation, no ideas of reference, no evidence of delusions or psychosis  Recent and Remote Memory: grossly intact, able to recall relevant and salient information from the recent and remote past; per interview/observation with patient  Attention and Concentration: grossly intact, attentive to the conversation and not readily distractible; per interview/observation with patient  Fund of Knowledge: grossly intact, used appropriate vocabulary and demonstrated an awareness of current events; based on history, vocabulary, fund of knowledge, syntax, grammar, and content  Insight: intact; based on understanding of severity of illness and HPI  Judgment: questionable; based on patient's behavior and HPI      Patient Strengths:  Access to care, Able to " verbalize needs, Stable physical health, Family/Peer support, and Motivation for treatment      Patient Liabilities:  Medication non-compliance, Psychosis, Heidi, and Chronic psychiatric illness      Discharge Criteria:  Improved mood, Improved thought process, Medication compliance, and Improved social functioning      Reason for Admission:  The patient poses a significant and immediate danger to self., The patient is gravely disabled due to a psychiatric condition., The psychiatric disorder requires intensive treatment that necessitates 24 hour observation and care., The patient presents with psychiatric symptoms of sufficient severity to bring about significant or profound impairment of day to day psychological, social, vocational, and/or educational functioning., and To stabilize an acute exacerbation of a severe persistent mental disorder.    ASSESSMENT/PLAN:   Diagnoses:  MOOD DISORDERS; Bipolar I Disorder, Most Recent Episode Manic: 6.7.4.Severe With Psychotic Features (F31.2)         History reviewed. No pertinent past medical history.       Problem lists and Management Plans:  -Admit to [facility]    Bipolar disorder with psychosis  -Zyprexa Zydis 5 mg now  -Zyprexa Zydis 10 mg QHS    -Will attempt to obtain outside psychiatric records if available  - to assist with aftercare planning and collateral  -Continue inpatient treatment as evidenced by significant psychotic thought disorder, danger to self, danger to others, and gravely disabled      Estimated length of stay: 5-7    Estimated Disposition: Home    Estimated Follow-up: Outpatient medication management          Ahsan ROSS-BC

## 2024-11-08 NOTE — PLAN OF CARE
Problem: Adult Behavioral Health Plan of Care  Goal: Plan of Care Review  Outcome: Unable to Meet  Goal: Patient-Specific Goal (Individualization)  Outcome: Unable to Meet  Goal: Adheres to Safety Considerations for Self and Others  Outcome: Unable to Meet  Goal: Absence of New-Onset Illness or Injury  Outcome: Unable to Meet  Goal: Optimized Coping Skills in Response to Life Stressors  Outcome: Unable to Meet  Goal: Develops/Participates in Therapeutic [place] to Support Successful Transition  Outcome: Unable to Meet  Goal: Rounds/Family Conference  Outcome: Unable to Meet     Problem: Psychotic Signs/Symptoms  Goal: Improved Behavioral Control (Psychotic Signs/Symptoms)  Outcome: Unable to Meet  Goal: Optimal Cognitive Function (Psychotic Signs/Symptoms)  Outcome: Unable to Meet  Goal: Increased Participation and Engagement (Psychotic Signs/Symptoms)  Outcome: Unable to Meet  Goal: Improved Mood Symptoms (Psychotic Signs/Symptoms)  Outcome: Unable to Meet  Goal: Improved Psychomotor Symptoms (Psychotic Signs/Symptoms)  Outcome: Unable to Meet  Goal: Decreased Sensory Symptoms (Psychotic Signs/Symptoms)  Outcome: Unable to Meet  Goal: Improved Sleep (Psychotic Signs/Symptoms)  Outcome: Unable to Meet  Goal: Enhanced Social, Occupational or Functional Skills (Psychotic Signs/Symptoms)  Outcome: Unable to Meet     Problem: Excessive Substance Use  Goal: Optimized Energy Level (Excessive Substance Use)  Outcome: Unable to Meet  Goal: Improved Behavioral Control (Excessive Substance Use)  Outcome: Unable to Meet  Goal: Increased Participation and Engagement (Excessive Substance Use)  Outcome: Unable to Meet  Goal: Improved Physiologic Symptoms (Excessive Substance Use)  Outcome: Unable to Meet  Goal: Enhanced Social, Occupational or Functional Skills (Excessive Substance Use)  Outcome: Unable to Meet     AAO to person and situation only.  Pt denies SI/HI. Pt denies having anxiety and depression, is pacing the halls,  responding to internal stimuli, crying at times. He admits to having hallucinations but could not elaborate on either. Pt has poor eye contact, affect is flat, he is withdrawn to self. Pt is confused at times. Q15 min safety checks continued. PRN medications offered and accepted.

## 2024-11-09 PROCEDURE — 12400001 HC PSYCH SEMI-PRIVATE ROOM

## 2024-11-09 PROCEDURE — S4991 NICOTINE PATCH NONLEGEND: HCPCS | Performed by: PSYCHIATRY & NEUROLOGY

## 2024-11-09 PROCEDURE — 25000003 PHARM REV CODE 250

## 2024-11-09 PROCEDURE — 25000003 PHARM REV CODE 250: Performed by: FAMILY MEDICINE

## 2024-11-09 PROCEDURE — 25000003 PHARM REV CODE 250: Performed by: PSYCHIATRY & NEUROLOGY

## 2024-11-09 RX ORDER — CEPHALEXIN 500 MG/1
500 CAPSULE ORAL EVERY 6 HOURS
Status: DISCONTINUED | OUTPATIENT
Start: 2024-11-09 | End: 2024-11-14 | Stop reason: HOSPADM

## 2024-11-09 RX ORDER — MUPIROCIN 20 MG/G
OINTMENT TOPICAL 2 TIMES DAILY
Status: DISCONTINUED | OUTPATIENT
Start: 2024-11-09 | End: 2024-11-14 | Stop reason: HOSPADM

## 2024-11-09 RX ORDER — HYDROXYZINE PAMOATE 25 MG/1
25 CAPSULE ORAL EVERY 6 HOURS PRN
Status: DISCONTINUED | OUTPATIENT
Start: 2024-11-09 | End: 2024-11-14 | Stop reason: HOSPADM

## 2024-11-09 RX ADMIN — NICOTINE 1 PATCH: 21 PATCH, EXTENDED RELEASE TRANSDERMAL at 09:11

## 2024-11-09 RX ADMIN — OLANZAPINE 10 MG: 5 TABLET, ORALLY DISINTEGRATING ORAL at 08:11

## 2024-11-09 RX ADMIN — CEPHALEXIN 500 MG: 500 CAPSULE ORAL at 09:11

## 2024-11-09 RX ADMIN — HYDROXYZINE HYDROCHLORIDE 50 MG: 50 TABLET, FILM COATED ORAL at 09:11

## 2024-11-09 RX ADMIN — CEPHALEXIN 500 MG: 500 CAPSULE ORAL at 02:11

## 2024-11-09 RX ADMIN — TRAZODONE HYDROCHLORIDE 100 MG: 100 TABLET ORAL at 09:11

## 2024-11-09 RX ADMIN — HYDROXYZINE PAMOATE 25 MG: 25 CAPSULE ORAL at 02:11

## 2024-11-09 NOTE — NURSING
"At 2312 pt came to nurse's station and stated that his HS scheduled medication was given too early and now he is "up and wired,". Administered PRN atarax and trazodone at this time.     0200 pt intermittently laying down in his bed and pacing in the terrell. Will frequently ask for the time and walk back to his bed. Easily redirectable.   "

## 2024-11-09 NOTE — PLAN OF CARE
Problem: Adult Behavioral Health Plan of Care  Goal: Plan of Care Review  Outcome: Progressing  Flowsheets (Taken 11/8/2024 2355)  Patient Agreement with Plan of Care: agrees  Plan of Care Reviewed With: patient  Goal: Patient-Specific Goal (Individualization)  Outcome: Progressing  Flowsheets (Taken 11/8/2024 2355)  Patient Personal Strengths: independent living skills  Patient Vulnerabilities: substance abuse/addiction  Goal: Adheres to Safety Considerations for Self and Others  Outcome: Progressing  Flowsheets (Taken 11/8/2024 2355)  Adheres to Safety Considerations for Self and Others: unable to achieve outcome  Intervention: Develop and Maintain Individualized Safety Plan  Flowsheets (Taken 11/8/2024 2355)  Safety Measures:   safety rounds completed   self-directed behavior promoted  Goal: Absence of New-Onset Illness or Injury  Outcome: Progressing  Goal: Optimized Coping Skills in Response to Life Stressors  Outcome: Progressing  Flowsheets (Taken 11/8/2024 2355)  Optimized Coping Skills in Response to Life Stressors: unable to achieve outcome  Intervention: Promote Effective Coping Strategies  Flowsheets (Taken 11/8/2024 2355)  Supportive Measures:   active listening utilized   verbalization of feelings encouraged   self-care encouraged  Goal: Develops/Participates in Therapeutic [place] to Support Successful Transition  Outcome: Progressing  Flowsheets (Taken 11/8/2024 2355)  Develops/Participates in Therapeutic [place] to Support Successful Transition: unable to achieve outcome  Intervention: Foster Therapeutic [place]  Flowsheets (Taken 11/8/2024 2355)  Trust Relationship/Rapport:   care explained   questions encouraged   reassurance provided  Goal: Rounds/Family Conference  Outcome: Progressing  Flowsheets (Taken 11/8/2024 2355)  Participants:   milieu/psych techs   nursing     Problem: Psychotic Signs/Symptoms  Goal: Improved Behavioral Control (Psychotic Signs/Symptoms)  Outcome:  Progressing  Flowsheets (Taken 11/8/2024 2355)  Mutually Determined Action Steps (Improved Behavioral Control): identifies symptoms triggers  Intervention: Manage Behavior  Flowsheets (Taken 11/8/2024 2355)  De-Escalation Techniques: quiet time facilitated  Goal: Optimal Cognitive Function (Psychotic Signs/Symptoms)  Outcome: Progressing  Flowsheets (Taken 11/8/2024 2355)  Mutually Determined Action Steps (Optimal Cognitive Function): follows step-by-step instructions  Intervention: Support and Promote Cognitive Ability  Flowsheets (Taken 11/8/2024 2355)  Trust Relationship/Rapport:   care explained   questions encouraged   reassurance provided  Goal: Increased Participation and Engagement (Psychotic Signs/Symptoms)  Outcome: Progressing  Flowsheets (Taken 11/8/2024 2355)  Mutually Determined Action Steps (Increased Participation and Engagement): verbalizes gratifying activity  Intervention: Facilitate Participation and Engagement  Flowsheets (Taken 11/8/2024 2355)  Supportive Measures:   active listening utilized   verbalization of feelings encouraged   self-care encouraged  Diversional Activity: television  Goal: Improved Mood Symptoms (Psychotic Signs/Symptoms)  Outcome: Progressing  Flowsheets (Taken 11/8/2024 2355)  Mutually Determined Action Steps (Improved Mood Symptoms): acknowledges progress  Intervention: Optimize Emotion and Mood  Flowsheets (Taken 11/8/2024 2355)  Supportive Measures:   active listening utilized   verbalization of feelings encouraged   self-care encouraged  Diversional Activity: television  Goal: Improved Psychomotor Symptoms (Psychotic Signs/Symptoms)  Outcome: Progressing  Flowsheets (Taken 11/8/2024 2355)  Mutually Determined Action Steps (Improved Psychomotor Symptoms): adheres to medication regimen  Intervention: Manage Psychomotor Movement  Flowsheets (Taken 11/8/2024 2355)  Activity (Behavioral Health): up ad duncan  Diversional Activity: television  Goal: Decreased Sensory Symptoms  (Psychotic Signs/Symptoms)  Outcome: Progressing  Flowsheets (Taken 11/8/2024 2355)  Mutually Determined Action Steps (Decreased Sensory Symptoms): adheres to medication regimen  Intervention: Minimize and Manage Sensory Impairment  Flowsheets (Taken 11/8/2024 2355)  Sensory Stimulation Regulation: quiet environment promoted  Goal: Improved Sleep (Psychotic Signs/Symptoms)  Outcome: Progressing  Flowsheets (Taken 11/8/2024 2355)  Mutually Determined Action Steps (Improved Sleep): sleeps 4-6 hours at night  Intervention: Promote Healthy Sleep Hygiene  Flowsheets (Taken 11/8/2024 2355)  Sleep Hygiene Promotion:   awakenings minimized   regular sleep pattern promoted  Goal: Enhanced Social, Occupational or Functional Skills (Psychotic Signs/Symptoms)  Outcome: Progressing  Flowsheets (Taken 11/8/2024 2355)  Mutually Determined Action Steps (Enhanced Social, Occupational or Functional Skills): participates in social skills training  Intervention: Promote Social, Occupational and Functional Ability  Flowsheets (Taken 11/8/2024 2355)  Trust Relationship/Rapport:   care explained   questions encouraged   reassurance provided  Social Functional Ability Promotion:   autonomy promoted   self-expression encouraged   social interaction promoted     Problem: Excessive Substance Use  Goal: Optimized Energy Level (Excessive Substance Use)  Outcome: Progressing  Flowsheets (Taken 11/8/2024 2355)  Mutually Determined Action Steps (Optimized Energy Level): grooms self without prompting  Intervention: Optimize Energy Level  Flowsheets (Taken 11/8/2024 2355)  Activity (Behavioral Health): up ad duncan  Diversional Activity: television  Goal: Improved Behavioral Control (Excessive Substance Use)  Outcome: Progressing  Flowsheets (Taken 11/8/2024 2355)  Mutually Determined Action Steps (Improved Behavioral Control): identifies major stressors  Intervention: Promote Behavior and Impulse Control  Flowsheets (Taken 11/8/2024 2355)  Behavior  Management: behavioral plan reviewed  Goal: Increased Participation and Engagement (Excessive Substance Use)  Outcome: Progressing  Flowsheets (Taken 11/8/2024 2355)  Mutually Determined Action Steps (Increased Participation and Engagement): discusses ongoing recovery plan  Intervention: Facilitate Participation and Engagement  Flowsheets (Taken 11/8/2024 2355)  Supportive Measures:   active listening utilized   verbalization of feelings encouraged   self-care encouraged  Diversional Activity: television  Goal: Improved Physiologic Symptoms (Excessive Substance Use)  Outcome: Progressing  Flowsheets (Taken 11/8/2024 2355)  Mutually Determined Action Steps (Improved Physiologic Symptoms): discusses use pattern  Intervention: Optimize Physiologic Function  Flowsheets (Taken 11/8/2024 2355)  Oral Nutrition Promotion:   social interaction promoted   physical activity promoted  Goal: Enhanced Social, Occupational or Functional Skills (Excessive Substance Use)  Outcome: Progressing  Flowsheets (Taken 11/8/2024 2355)  Mutually Determined Action Steps (Enhanced Social, Occupational or Functional Skills): participates in social skills training  Intervention: Promote Social, Occupational and Functional Ability  Flowsheets (Taken 11/8/2024 2355)  Trust Relationship/Rapport:   care explained   questions encouraged   reassurance provided  Social Functional Ability Promotion:   autonomy promoted   self-expression encouraged   social interaction promoted     AAOx4 Pt denies SI/HI. Pt denies having anxiety and depression, reports poor sleep and good appetite. Pt has poor eye contact and affect is flat, remains withdrawn and isolative to room. Q15 min safety checks continued.

## 2024-11-09 NOTE — PROGRESS NOTES
Progress Note    Admit Date: 11/7/2024   LOS: 2 days     SUBJECTIVE:     Consulted to see for severe ant bites and sores bilaterally to feet. Patient reports he was bitten by ants. States feet burn and itch.     Scheduled Meds:   nicotine  1 patch Transdermal Daily    OLANZapine zydis  10 mg Oral QHS     Continuous Infusions:  PRN Meds:  Current Facility-Administered Medications:     acetaminophen, 650 mg, Oral, Q6H PRN    aluminum-magnesium hydroxide-simethicone, 30 mL, Oral, Q6H PRN    haloperidoL, 10 mg, Oral, Q4H PRN **AND** diphenhydrAMINE, 50 mg, Oral, Q4H PRN **AND** LORazepam, 2 mg, Oral, Q4H PRN **AND** haloperidol lactate, 10 mg, Intramuscular, Q4H PRN **AND** diphenhydrAMINE, 50 mg, Intramuscular, Q4H PRN **AND** lorazepam, 2 mg, Intramuscular, Q4H PRN    hydrOXYzine HCL, 50 mg, Oral, Q4H PRN    magnesium hydroxide 400 mg/5 ml, 30 mL, Oral, Daily PRN    ondansetron, 4 mg, Oral, Q6H PRN    traZODone, 100 mg, Oral, Nightly PRN    Review of patient's allergies indicates:  No Known Allergies    Review of Systems  Review of Systems   Constitutional:  Negative for fever.   Respiratory:  Negative for cough and shortness of breath.    Cardiovascular:  Negative for chest pain.   Skin:  Positive for itching and rash.        OBJECTIVE:     Vital Signs (Most Recent)  Temp: 97.5 °F (36.4 °C) (11/09/24 0701)  Pulse: 60 (11/09/24 0800)  Resp: 18 (11/09/24 0701)  BP: 125/76 (11/09/24 0701)  SpO2: 100 % (11/09/24 0701)    Vital Signs Range (Last 24H):  Temp:  [97.5 °F (36.4 °C)-97.9 °F (36.6 °C)]   Pulse:  [60-84]   Resp:  [17-18]   BP: (125-143)/(76-96)   SpO2:  [98 %-100 %]     I & O (Last 24H):No intake or output data in the 24 hours ending 11/09/24 1349  Physical Exam:  Physical Exam  Constitutional:       General: He is not in acute distress.  Pulmonary:      Effort: Pulmonary effort is normal.   Skin:     Comments: Many numerous bite marks to feet bilaterally, most have pustules with surrounding redness, 1-2cm open  sore on right foot/ankle as well   Neurological:      Mental Status: He is alert.          Laboratory:  No results found for this or any previous visit (from the past 24 hours).         ASSESSMENT/PLAN:     Infected ant bites to feet- begin vistaril for itching, start oral abx, also topical bactroban

## 2024-11-09 NOTE — NURSING
Patient alert and oriented x 4.Denies anxiety and depression.Denies SI and HI ,denies visual and auditory hallucination Ambulatory with steady gait. Patient showed this wound to this RN and noted multiple red circular spots  with one open wound on each sides of foot . Patient was placed in the medical board for assessment. .

## 2024-11-10 PROCEDURE — 25000003 PHARM REV CODE 250

## 2024-11-10 PROCEDURE — 25000003 PHARM REV CODE 250: Performed by: FAMILY MEDICINE

## 2024-11-10 PROCEDURE — S4991 NICOTINE PATCH NONLEGEND: HCPCS | Performed by: PSYCHIATRY & NEUROLOGY

## 2024-11-10 PROCEDURE — 25000003 PHARM REV CODE 250: Performed by: PSYCHIATRY & NEUROLOGY

## 2024-11-10 PROCEDURE — 12400001 HC PSYCH SEMI-PRIVATE ROOM

## 2024-11-10 RX ADMIN — CEPHALEXIN 500 MG: 500 CAPSULE ORAL at 12:11

## 2024-11-10 RX ADMIN — TRAZODONE HYDROCHLORIDE 100 MG: 100 TABLET ORAL at 08:11

## 2024-11-10 RX ADMIN — CEPHALEXIN 500 MG: 500 CAPSULE ORAL at 05:11

## 2024-11-10 RX ADMIN — CEPHALEXIN 500 MG: 500 CAPSULE ORAL at 04:11

## 2024-11-10 RX ADMIN — NICOTINE 1 PATCH: 21 PATCH, EXTENDED RELEASE TRANSDERMAL at 08:11

## 2024-11-10 RX ADMIN — MUPIROCIN: 20 OINTMENT TOPICAL at 08:11

## 2024-11-10 RX ADMIN — HYDROXYZINE HYDROCHLORIDE 50 MG: 50 TABLET, FILM COATED ORAL at 08:11

## 2024-11-10 RX ADMIN — MUPIROCIN: 20 OINTMENT TOPICAL at 09:11

## 2024-11-10 RX ADMIN — ACETAMINOPHEN 325MG 650 MG: 325 TABLET ORAL at 04:11

## 2024-11-10 RX ADMIN — OLANZAPINE 10 MG: 5 TABLET, ORALLY DISINTEGRATING ORAL at 08:11

## 2024-11-10 NOTE — PLAN OF CARE
Problem: Adult Behavioral Health Plan of Care  Goal: Plan of Care Review  Outcome: Progressing  Flowsheets (Taken 11/10/2024 1423)  Consent Given to Review Plan with: foot treatment and behavior  Patient Agreement with Plan of Care: agrees  Plan of Care Reviewed With: patient  Goal: Patient-Specific Goal (Individualization)  Outcome: Progressing  Flowsheets (Taken 11/10/2024 1423)  Patient Personal Strengths:   ability to maintain sobriety   good impulse control   medication/treatment adherence  Patient Vulnerabilities:   substance abuse/addiction   poor physical health   limited support system   lacks insight into illness  Anxieties, Fears or Concerns: wound care and antibiotics  Goal: Adheres to Safety Considerations for Self and Others  Outcome: Progressing  Flowsheets (Taken 11/10/2024 1423)  Adheres to Safety Considerations for Self and Others: making progress toward outcome  Goal: Absence of New-Onset Illness or Injury  Outcome: Progressing  Intervention: Prevent Infection  Flowsheets (Taken 11/10/2024 1423)  Infection Prevention:   hand hygiene promoted   rest/sleep promoted  Goal: Optimized Coping Skills in Response to Life Stressors  Outcome: Progressing  Flowsheets (Taken 11/10/2024 1423)  Optimized Coping Skills in Response to Life Stressors: making progress toward outcome  Goal: Develops/Participates in Therapeutic [place] to Support Successful Transition  Outcome: Progressing  Flowsheets (Taken 11/10/2024 1423)  Develops/Participates in Therapeutic [place] to Support Successful Transition: making progress toward outcome  Goal: Rounds/Family Conference  Outcome: Progressing     Problem: Psychotic Signs/Symptoms  Goal: Improved Behavioral Control (Psychotic Signs/Symptoms)  Outcome: Progressing  Flowsheets (Taken 11/10/2024 1423)  Mutually Determined Action Steps (Improved Behavioral Control): identifies symptoms triggers  Goal: Optimal Cognitive Function (Psychotic Signs/Symptoms)  Outcome:  Progressing  Flowsheets (Taken 11/10/2024 1423)  Mutually Determined Action Steps (Optimal Cognitive Function):   participates in problem resolution   follows step-by-step instructions  Goal: Increased Participation and Engagement (Psychotic Signs/Symptoms)  Outcome: Progressing  Flowsheets (Taken 11/10/2024 1423)  Mutually Determined Action Steps (Increased Participation and Engagement):   identifies symptoms triggers   verbalizes personal treatment goal  Goal: Improved Mood Symptoms (Psychotic Signs/Symptoms)  Outcome: Progressing  Flowsheets (Taken 11/10/2024 1423)  Mutually Determined Action Steps (Improved Mood Symptoms): engages in physical activity  Goal: Improved Psychomotor Symptoms (Psychotic Signs/Symptoms)  Outcome: Progressing  Flowsheets (Taken 11/10/2024 1423)  Mutually Determined Action Steps (Improved Psychomotor Symptoms): adheres to medication regimen  Goal: Decreased Sensory Symptoms (Psychotic Signs/Symptoms)  Outcome: Progressing  Flowsheets (Taken 11/10/2024 1423)  Mutually Determined Action Steps (Decreased Sensory Symptoms): adheres to medication regimen  Goal: Improved Sleep (Psychotic Signs/Symptoms)  Outcome: Progressing  Flowsheets (Taken 11/10/2024 1423)  Mutually Determined Action Steps (Improved Sleep): sleeps 4-6 hours at night  Goal: Enhanced Social, Occupational or Functional Skills (Psychotic Signs/Symptoms)  Outcome: Progressing  Flowsheets (Taken 11/10/2024 1423)  Mutually Determined Action Steps (Enhanced Social, Occupational or Functional Skills):   participates in social skills training   identifies support resources     Problem: Excessive Substance Use  Goal: Optimized Energy Level (Excessive Substance Use)  Outcome: Progressing  Flowsheets (Taken 11/10/2024 1423)  Mutually Determined Action Steps (Optimized Energy Level): grooms self without prompting  Goal: Improved Behavioral Control (Excessive Substance Use)  Outcome: Progressing  Flowsheets (Taken 11/10/2024  4223)  Mutually Determined Action Steps (Improved Behavioral Control): identifies major stressors  Goal: Increased Participation and Engagement (Excessive Substance Use)  Outcome: Progressing  Flowsheets (Taken 11/10/2024 1423)  Mutually Determined Action Steps (Increased Participation and Engagement): discusses ongoing recovery plan  Goal: Improved Physiologic Symptoms (Excessive Substance Use)  Outcome: Progressing  Flowsheets (Taken 11/10/2024 1423)  Mutually Determined Action Steps (Improved Physiologic Symptoms): discusses use pattern  Goal: Enhanced Social, Occupational or Functional Skills (Excessive Substance Use)  Outcome: Progressing  Flowsheets (Taken 11/10/2024 1423)  Mutually Determined Action Steps (Enhanced Social, Occupational or Functional Skills):   participates in social skills training   identifies support resources

## 2024-11-10 NOTE — NURSING
2144 Pt @ desk requesting PRN sleep and anxiety medication. Administered trazodone 100 mg po and atarax 50 mg po.         0200 Pt walking around room and talking. No signs of anxiety noted.

## 2024-11-10 NOTE — NURSING
0453 Pt requested PRN pain medication for bilateral foot pain 10/10. Administered APAP 650 mg po.      0540 Pt goes between being in bed and walking around room talking to self. No signs of pain noted @ present time.

## 2024-11-10 NOTE — NURSING
"Claimed of anxiety today,"it is all about my foot but getting better though with the medication" started per verbalization.Claimed of pain 7/10 has been up almost all night tonight because he said he slept all day during day time.Will observe further sleep pattern. Denies depression,denies auditory and visual hallucination,denies SI and HI .Pacing but cooperative with staff.easily redirected.  "

## 2024-11-10 NOTE — PLAN OF CARE
Problem: Adult Behavioral Health Plan of Care  Goal: Plan of Care Review  Outcome: Progressing  Flowsheets (Taken 11/9/2024 2146)  Patient Agreement with Plan of Care: agrees  Plan of Care Reviewed With: patient  Goal: Patient-Specific Goal (Individualization)  Outcome: Progressing  Flowsheets (Taken 11/9/2024 2146)  Patient Personal Strengths: independent living skills  Patient Vulnerabilities:   substance abuse/addiction   lacks insight into illness  Goal: Adheres to Safety Considerations for Self and Others  Outcome: Progressing  Flowsheets (Taken 11/9/2024 2146)  Adheres to Safety Considerations for Self and Others: making progress toward outcome  Intervention: Develop and Maintain Individualized Safety Plan  Flowsheets (Taken 11/9/2024 2146)  Safety Measures:   safety rounds completed   self-directed behavior promoted  Goal: Absence of New-Onset Illness or Injury  Outcome: Progressing  Goal: Optimized Coping Skills in Response to Life Stressors  Outcome: Progressing  Flowsheets (Taken 11/9/2024 2146)  Optimized Coping Skills in Response to Life Stressors: making progress toward outcome  Intervention: Promote Effective Coping Strategies  Flowsheets (Taken 11/9/2024 2146)  Supportive Measures:   active listening utilized   verbalization of feelings encouraged   self-care encouraged  Goal: Develops/Participates in Therapeutic [place] to Support Successful Transition  Outcome: Progressing  Flowsheets (Taken 11/9/2024 2146)  Develops/Participates in Therapeutic [place] to Support Successful Transition: making progress toward outcome  Intervention: Foster Therapeutic [place]  Flowsheets (Taken 11/9/2024 2146)  Trust Relationship/Rapport:   care explained   questions encouraged   reassurance provided  Goal: Rounds/Family Conference  Outcome: Progressing  Flowsheets (Taken 11/9/2024 2146)  Participants:   milieu/psych techs   nursing     Problem: Psychotic Signs/Symptoms  Goal: Improved Behavioral Control  (Psychotic Signs/Symptoms)  Outcome: Progressing  Flowsheets (Taken 11/9/2024 2146)  Mutually Determined Action Steps (Improved Behavioral Control): identifies symptoms triggers  Intervention: Manage Behavior  Flowsheets (Taken 11/9/2024 2146)  De-Escalation Techniques: quiet time facilitated  Goal: Optimal Cognitive Function (Psychotic Signs/Symptoms)  Outcome: Progressing  Flowsheets (Taken 11/9/2024 2146)  Mutually Determined Action Steps (Optimal Cognitive Function): follows step-by-step instructions  Intervention: Support and Promote Cognitive Ability  Flowsheets (Taken 11/9/2024 2146)  Trust Relationship/Rapport:   care explained   questions encouraged   reassurance provided  Goal: Increased Participation and Engagement (Psychotic Signs/Symptoms)  Outcome: Progressing  Flowsheets (Taken 11/9/2024 2146)  Mutually Determined Action Steps (Increased Participation and Engagement): verbalizes gratifying activity  Intervention: Facilitate Participation and Engagement  Flowsheets (Taken 11/9/2024 2146)  Supportive Measures:   active listening utilized   verbalization of feelings encouraged   self-care encouraged  Diversional Activity: television  Goal: Improved Mood Symptoms (Psychotic Signs/Symptoms)  Outcome: Progressing  Flowsheets (Taken 11/9/2024 2146)  Mutually Determined Action Steps (Improved Mood Symptoms): acknowledges progress  Intervention: Optimize Emotion and Mood  Flowsheets (Taken 11/9/2024 2146)  Supportive Measures:   active listening utilized   verbalization of feelings encouraged   self-care encouraged  Diversional Activity: television  Goal: Improved Psychomotor Symptoms (Psychotic Signs/Symptoms)  Outcome: Progressing  Flowsheets (Taken 11/9/2024 2146)  Mutually Determined Action Steps (Improved Psychomotor Symptoms): adheres to medication regimen  Intervention: Manage Psychomotor Movement  Flowsheets (Taken 11/9/2024 2146)  Activity (Behavioral Health): up ad duncan  Diversional Activity:  television  Goal: Decreased Sensory Symptoms (Psychotic Signs/Symptoms)  Outcome: Progressing  Flowsheets (Taken 11/9/2024 2146)  Mutually Determined Action Steps (Decreased Sensory Symptoms): adheres to medication regimen  Intervention: Minimize and Manage Sensory Impairment  Flowsheets (Taken 11/9/2024 2146)  Sensory Stimulation Regulation: quiet environment promoted  Goal: Improved Sleep (Psychotic Signs/Symptoms)  Outcome: Progressing  Flowsheets (Taken 11/9/2024 2146)  Mutually Determined Action Steps (Improved Sleep): sleeps 4-6 hours at night  Intervention: Promote Healthy Sleep Hygiene  Flowsheets (Taken 11/9/2024 2146)  Sleep Hygiene Promotion:   awakenings minimized   regular sleep pattern promoted  Goal: Enhanced Social, Occupational or Functional Skills (Psychotic Signs/Symptoms)  Outcome: Progressing  Flowsheets (Taken 11/9/2024 2146)  Mutually Determined Action Steps (Enhanced Social, Occupational or Functional Skills): participates in social skills training  Intervention: Promote Social, Occupational and Functional Ability  Flowsheets (Taken 11/9/2024 2146)  Trust Relationship/Rapport:   care explained   questions encouraged   reassurance provided  Social Functional Ability Promotion:   autonomy promoted   self-expression encouraged   social interaction promoted     Problem: Excessive Substance Use  Goal: Optimized Energy Level (Excessive Substance Use)  Outcome: Progressing  Flowsheets (Taken 11/9/2024 2146)  Mutually Determined Action Steps (Optimized Energy Level): grooms self without prompting  Intervention: Optimize Energy Level  Flowsheets (Taken 11/9/2024 2146)  Activity (Behavioral Health): up ad duncan  Diversional Activity: television  Goal: Improved Behavioral Control (Excessive Substance Use)  Outcome: Progressing  Intervention: Promote Behavior and Impulse Control  Flowsheets (Taken 11/9/2024 2146)  Behavior Management: behavioral plan reviewed  Goal: Increased Participation and Engagement  (Excessive Substance Use)  Outcome: Progressing  Intervention: Facilitate Participation and Engagement  Flowsheets (Taken 11/9/2024 2146)  Supportive Measures:   active listening utilized   verbalization of feelings encouraged   self-care encouraged  Diversional Activity: television  Goal: Improved Physiologic Symptoms (Excessive Substance Use)  Outcome: Progressing  Flowsheets (Taken 11/9/2024 2146)  Mutually Determined Action Steps (Improved Physiologic Symptoms): discusses use pattern  Intervention: Optimize Physiologic Function  Flowsheets (Taken 11/9/2024 2146)  Oral Nutrition Promotion:   social interaction promoted   physical activity promoted  Goal: Enhanced Social, Occupational or Functional Skills (Excessive Substance Use)  Outcome: Progressing  Flowsheets (Taken 11/9/2024 2146)  Mutually Determined Action Steps (Enhanced Social, Occupational or Functional Skills): participates in social skills training  Intervention: Promote Social, Occupational and Functional Ability  Flowsheets (Taken 11/9/2024 2146)  Trust Relationship/Rapport:   care explained   questions encouraged   reassurance provided  Social Functional Ability Promotion:   autonomy promoted   self-expression encouraged   social interaction promoted     AAOx4 Pt denies SI/HI. Pt denies having anxiety and depression, reports poor sleep and good appetite. Pt has poor eye contact and affect is flat, remains withdrawn to self, paces halls. Q15 min safety checks continued.

## 2024-11-11 PROCEDURE — 12400001 HC PSYCH SEMI-PRIVATE ROOM

## 2024-11-11 PROCEDURE — 25000003 PHARM REV CODE 250: Performed by: FAMILY MEDICINE

## 2024-11-11 PROCEDURE — S4991 NICOTINE PATCH NONLEGEND: HCPCS | Performed by: PSYCHIATRY & NEUROLOGY

## 2024-11-11 PROCEDURE — 25000003 PHARM REV CODE 250

## 2024-11-11 PROCEDURE — 25000003 PHARM REV CODE 250: Performed by: PSYCHIATRY & NEUROLOGY

## 2024-11-11 RX ADMIN — ACETAMINOPHEN 325MG 650 MG: 325 TABLET ORAL at 01:11

## 2024-11-11 RX ADMIN — MUPIROCIN: 20 OINTMENT TOPICAL at 08:11

## 2024-11-11 RX ADMIN — CEPHALEXIN 500 MG: 500 CAPSULE ORAL at 11:11

## 2024-11-11 RX ADMIN — CEPHALEXIN 500 MG: 500 CAPSULE ORAL at 05:11

## 2024-11-11 RX ADMIN — OLANZAPINE 10 MG: 5 TABLET, ORALLY DISINTEGRATING ORAL at 09:11

## 2024-11-11 RX ADMIN — NICOTINE 1 PATCH: 21 PATCH, EXTENDED RELEASE TRANSDERMAL at 08:11

## 2024-11-11 RX ADMIN — CEPHALEXIN 500 MG: 500 CAPSULE ORAL at 12:11

## 2024-11-11 NOTE — PLAN OF CARE
Problem: Adult Behavioral Health Plan of Care  Goal: Plan of Care Review  Outcome: Progressing  Flowsheets (Taken 11/10/2024 1951)  Patient Agreement with Plan of Care: agrees  Plan of Care Reviewed With: patient  Goal: Patient-Specific Goal (Individualization)  Outcome: Progressing  Flowsheets (Taken 11/10/2024 1951)  Patient Personal Strengths: expressive of needs  Patient Vulnerabilities: substance abuse/addiction  Goal: Adheres to Safety Considerations for Self and Others  Outcome: Progressing  Flowsheets (Taken 11/10/2024 1951)  Adheres to Safety Considerations for Self and Others: making progress toward outcome  Intervention: Develop and Maintain Individualized Safety Plan  Flowsheets (Taken 11/10/2024 1951)  Safety Measures:   safety rounds completed   self-directed behavior promoted  Goal: Absence of New-Onset Illness or Injury  Outcome: Progressing  Goal: Optimized Coping Skills in Response to Life Stressors  Outcome: Progressing  Flowsheets (Taken 11/10/2024 1951)  Optimized Coping Skills in Response to Life Stressors: making progress toward outcome  Intervention: Promote Effective Coping Strategies  Flowsheets (Taken 11/10/2024 1951)  Supportive Measures:   active listening utilized   verbalization of feelings encouraged   self-care encouraged  Goal: Develops/Participates in Therapeutic [place] to Support Successful Transition  Outcome: Progressing  Flowsheets (Taken 11/10/2024 1951)  Develops/Participates in Therapeutic [place] to Support Successful Transition: making progress toward outcome  Intervention: Foster Therapeutic [place]  Flowsheets (Taken 11/10/2024 1951)  Trust Relationship/Rapport:   care explained   questions encouraged   reassurance provided  Goal: Rounds/Family Conference  Outcome: Progressing  Flowsheets (Taken 11/10/2024 1951)  Participants:   milieu/psych techs   nursing     Problem: Psychotic Signs/Symptoms  Goal: Improved Behavioral Control (Psychotic Signs/Symptoms)  Outcome:  Progressing  Flowsheets (Taken 11/10/2024 1951)  Mutually Determined Action Steps (Improved Behavioral Control): verbalizes gratifying activity  Intervention: Manage Behavior  Flowsheets (Taken 11/10/2024 1951)  De-Escalation Techniques: quiet time facilitated  Goal: Optimal Cognitive Function (Psychotic Signs/Symptoms)  Outcome: Progressing  Intervention: Support and Promote Cognitive Ability  Flowsheets (Taken 11/10/2024 1951)  Trust Relationship/Rapport:   care explained   questions encouraged   reassurance provided  Goal: Increased Participation and Engagement (Psychotic Signs/Symptoms)  Outcome: Progressing  Flowsheets (Taken 11/10/2024 1951)  Mutually Determined Action Steps (Increased Participation and Engagement): verbalizes gratifying activity  Intervention: Facilitate Participation and Engagement  Flowsheets (Taken 11/10/2024 1951)  Supportive Measures:   active listening utilized   verbalization of feelings encouraged   self-care encouraged  Diversional Activity: television  Goal: Improved Mood Symptoms (Psychotic Signs/Symptoms)  Outcome: Progressing  Flowsheets (Taken 11/10/2024 1951)  Mutually Determined Action Steps (Improved Mood Symptoms): engages in physical activity  Intervention: Optimize Emotion and Mood  Flowsheets (Taken 11/10/2024 1951)  Supportive Measures:   active listening utilized   verbalization of feelings encouraged   self-care encouraged  Diversional Activity: television  Goal: Improved Psychomotor Symptoms (Psychotic Signs/Symptoms)  Outcome: Progressing  Flowsheets (Taken 11/10/2024 1951)  Mutually Determined Action Steps (Improved Psychomotor Symptoms): adheres to medication regimen  Intervention: Manage Psychomotor Movement  Flowsheets (Taken 11/10/2024 1951)  Activity (Behavioral Health): up ad duncan  Diversional Activity: television  Goal: Decreased Sensory Symptoms (Psychotic Signs/Symptoms)  Outcome: Progressing  Flowsheets (Taken 11/10/2024 1951)  Mutually Determined Action  Steps (Decreased Sensory Symptoms): adheres to medication regimen  Intervention: Minimize and Manage Sensory Impairment  Flowsheets (Taken 11/10/2024 1951)  Sensory Stimulation Regulation: quiet environment promoted  Goal: Improved Sleep (Psychotic Signs/Symptoms)  Outcome: Progressing  Flowsheets (Taken 11/10/2024 1951)  Mutually Determined Action Steps (Improved Sleep): sleeps 4-6 hours at night  Intervention: Promote Healthy Sleep Hygiene  Flowsheets (Taken 11/10/2024 1951)  Sleep Hygiene Promotion:   awakenings minimized   regular sleep pattern promoted  Goal: Enhanced Social, Occupational or Functional Skills (Psychotic Signs/Symptoms)  Outcome: Progressing  Intervention: Promote Social, Occupational and Functional Ability  Flowsheets (Taken 11/10/2024 1951)  Trust Relationship/Rapport:   care explained   questions encouraged   reassurance provided  Social Functional Ability Promotion:   autonomy promoted   self-expression encouraged   social interaction promoted     Problem: Excessive Substance Use  Goal: Optimized Energy Level (Excessive Substance Use)  Outcome: Progressing  Flowsheets (Taken 11/10/2024 1951)  Mutually Determined Action Steps (Optimized Energy Level): participates in exercise activity  Intervention: Optimize Energy Level  Flowsheets (Taken 11/10/2024 1951)  Activity (Behavioral Health): up ad [place]  Diversional Activity: television  Goal: Improved Behavioral Control (Excessive Substance Use)  Outcome: Progressing  Intervention: Promote Behavior and Impulse Control  Flowsheets (Taken 11/10/2024 1951)  Behavior Management: behavioral plan reviewed  Goal: Increased Participation and Engagement (Excessive Substance Use)  Outcome: Progressing  Intervention: Facilitate Participation and Engagement  Flowsheets (Taken 11/10/2024 1951)  Supportive Measures:   active listening utilized   verbalization of feelings encouraged   self-care encouraged  Diversional Activity: television  Goal: Improved  Physiologic Symptoms (Excessive Substance Use)  Outcome: Progressing  Intervention: Optimize Physiologic Function  Flowsheets (Taken 11/10/2024 1951)  Oral Nutrition Promotion:   social interaction promoted   physical activity promoted  Goal: Enhanced Social, Occupational or Functional Skills (Excessive Substance Use)  Outcome: Progressing  Intervention: Promote Social, Occupational and Functional Ability  Flowsheets (Taken 11/10/2024 1951)  Trust Relationship/Rapport:   care explained   questions encouraged   reassurance provided  Social Functional Ability Promotion:   autonomy promoted   self-expression encouraged   social interaction promoted     AAOx4 Pt denies SI/HI. Pt endorses having anxiety and depression, reports poor sleep and good appetite. Pt has poor eye contact and affect is flat, remains withdrawn to self, paces halls. Q15 min safety checks continued. Pt can be seen to respond to internal stimulation when he thinks no one is looking, denies having hallucinations.

## 2024-11-11 NOTE — PROGRESS NOTES
11/11/24 1000   [facility] Group Therapy   Group Name Therapeutic Recreation   Specific Interventions Skilled Activity Mild Exercises   Participation Level Active;Supportive;Appropriate;Attentive;Sharing   Participation Quality Cooperative;Social   Insight/Motivation Improved;Applies New Skills   Affect/Mood Display Appropriate   Cognition Alert   Psychomotor WNL

## 2024-11-11 NOTE — NURSING
0159 Pt requesting PRN pain medication for bilateral foot pain rating 7/10. Administered APAP 650 mg po.       0400 Pt ambulating up and down terrell with no signs of foot pain @ present time.

## 2024-11-11 NOTE — NURSING
At 1327, complaints of headache, PS 6/10.  Tylenol 650 mg., administered.  At 1357, Patient voices some relief from headache, PS 4/10.

## 2024-11-11 NOTE — NURSING
2026 Pt requested PRN sleep and anxiety medication. Administered trazodone 100 mg po and atarax 50 mg po.     2200 Pt in bed, eyes closed, resting quietly. No signs of anxiety noted.

## 2024-11-11 NOTE — PROGRESS NOTES
11/11/24 1400   [facility] Group Therapy   Group Name Therapeutic Recreation   Specific Interventions Skilled Activity Leisure Education and Awareness   Participation Level Supportive;Appropriate   Participation Quality Cooperative;Social   Insight/Motivation Improved   Affect/Mood Display Appropriate   Cognition Alert   Psychomotor WNL

## 2024-11-11 NOTE — PLAN OF CARE
Problem: Adult Behavioral Health Plan of Care  Goal: Plan of Care Review  Outcome: Progressing  Flowsheets (Taken 11/11/2024 1708)  Patient Agreement with Plan of Care: agrees  Plan of Care Reviewed With: patient  Goal: Patient-Specific Goal (Individualization)  Outcome: Progressing  Flowsheets (Taken 11/11/2024 1708)  Patient Personal Strengths: expressive of needs  Patient Vulnerabilities: substance abuse/addiction  Goal: Adheres to Safety Considerations for Self and Others  Outcome: Progressing  Flowsheets (Taken 11/11/2024 1708)  Adheres to Safety Considerations for Self and Others: making progress toward outcome  Intervention: Develop and Maintain Individualized Safety Plan  Flowsheets (Taken 11/11/2024 1708)  Safety Measures:   monitored by video   safety rounds completed  Goal: Absence of New-Onset Illness or Injury  Outcome: Progressing  Intervention: Identify and Manage Fall Risk  Flowsheets (Taken 11/11/2024 1708)  Safety Measures:   monitored by video   safety rounds completed  Intervention: Prevent VTE (Venous Thromboembolism)  Flowsheets (Taken 11/11/2024 1708)  VTE Prevention/Management:   ambulation promoted   fluids promoted  Intervention: Prevent Infection  Flowsheets (Taken 11/11/2024 1708)  Infection Prevention:   hand hygiene promoted   rest/sleep promoted  Goal: Optimized Coping Skills in Response to Life Stressors  Outcome: Progressing  Flowsheets (Taken 11/11/2024 1708)  Optimized Coping Skills in Response to Life Stressors: making progress toward outcome  Intervention: Promote Effective Coping Strategies  Flowsheets (Taken 11/11/2024 1708)  Supportive Measures:   active listening utilized   verbalization of feelings encouraged  Goal: Develops/Participates in Therapeutic [place] to Support Successful Transition  Outcome: Progressing  Flowsheets (Taken 11/11/2024 1708)  Develops/Participates in Therapeutic [place] to Support Successful Transition: making progress toward  outcome  Intervention: Foster Therapeutic [place]  Flowsheets (Taken 11/11/2024 1708)  Trust Relationship/Rapport:   care explained   questions encouraged   reassurance provided  Intervention: Mutually Develop Transition Plan  Flowsheets (Taken 11/11/2024 1708)  Current Discharge Risk:   psychiatric illness   substance use/abuse  Outpatient/Agency/Support Group Needs:   outpatient counseling   outpatient medication management  Patient/Family Anticipated Services at Transition:   outpatient care   mental health services  Concerns to be Addressed:   medication   mental health   substance/tobacco abuse/use  Goal: Rounds/Family Conference  Outcome: Progressing  Flowsheets (Taken 11/11/2024 1708)  Participants:   milieu/psych techs   nursing     Problem: Psychotic Signs/Symptoms  Goal: Improved Behavioral Control (Psychotic Signs/Symptoms)  Outcome: Progressing  Intervention: Manage Behavior  Flowsheets (Taken 11/11/2024 1708)  De-Escalation Techniques:   medication offered   quiet time facilitated  Goal: Optimal Cognitive Function (Psychotic Signs/Symptoms)  Outcome: Progressing  Intervention: Support and Promote Cognitive Ability  Flowsheets (Taken 11/11/2024 1708)  Trust Relationship/Rapport:   care explained   questions encouraged   reassurance provided  Goal: Increased Participation and Engagement (Psychotic Signs/Symptoms)  Outcome: Progressing  Intervention: Facilitate Participation and Engagement  Flowsheets (Taken 11/11/2024 1708)  Supportive Measures:   active listening utilized   verbalization of feelings encouraged  Diversional Activity: television  Goal: Improved Mood Symptoms (Psychotic Signs/Symptoms)  Outcome: Progressing  Flowsheets (Taken 11/11/2024 1708)  Mutually Determined Action Steps (Improved Mood Symptoms):   acknowledges progress   verbalizes increased insight  Intervention: Optimize Emotion and Mood  Flowsheets (Taken 11/11/2024 1708)  Supportive Measures:   active listening utilized    verbalization of feelings encouraged  Diversional Activity: television  Goal: Improved Psychomotor Symptoms (Psychotic Signs/Symptoms)  Outcome: Progressing  Flowsheets (Taken 11/11/2024 1708)  Mutually Determined Action Steps (Improved Psychomotor Symptoms): adheres to medication regimen  Intervention: Manage Psychomotor Movement  Flowsheets (Taken 11/11/2024 1708)  Activity (Behavioral Health): up ad duncan  Diversional Activity: television  Goal: Decreased Sensory Symptoms (Psychotic Signs/Symptoms)  Outcome: Progressing  Flowsheets (Taken 11/11/2024 1708)  Mutually Determined Action Steps (Decreased Sensory Symptoms): adheres to medication regimen  Intervention: Minimize and Manage Sensory Impairment  Flowsheets (Taken 11/11/2024 1708)  Sensory Stimulation Regulation: quiet environment promoted  Goal: Improved Sleep (Psychotic Signs/Symptoms)  Outcome: Progressing  Flowsheets (Taken 11/11/2024 1708)  Mutually Determined Action Steps (Improved Sleep): sleeps 4-6 hours at night  Intervention: Promote Healthy Sleep Hygiene  Flowsheets (Taken 11/11/2024 1708)  Sleep Hygiene Promotion:   awakenings minimized   regular sleep pattern promoted  Goal: Enhanced Social, Occupational or Functional Skills (Psychotic Signs/Symptoms)  Outcome: Progressing  Intervention: Promote Social, Occupational and Functional Ability  Flowsheets (Taken 11/11/2024 1708)  Trust Relationship/Rapport:   care explained   questions encouraged   reassurance provided  Social Functional Ability Promotion: self-expression encouraged     Problem: Excessive Substance Use  Goal: Optimized Energy Level (Excessive Substance Use)  Outcome: Progressing  Intervention: Optimize Energy Level  Flowsheets (Taken 11/11/2024 1708)  Activity (Behavioral Health): up ad duncan  Diversional Activity: television  Goal: Improved Behavioral Control (Excessive Substance Use)  Outcome: Progressing  Intervention: Promote Behavior and Impulse Control  Flowsheets (Taken 11/11/2024  1708)  Behavior Management: behavioral plan reviewed  Goal: Increased Participation and Engagement (Excessive Substance Use)  Outcome: Progressing  Intervention: Facilitate Participation and Engagement  Flowsheets (Taken 11/11/2024 1708)  Supportive Measures:   active listening utilized   verbalization of feelings encouraged  Diversional Activity: television  Goal: Improved Physiologic Symptoms (Excessive Substance Use)  Outcome: Progressing  Intervention: Optimize Physiologic Function  Flowsheets (Taken 11/11/2024 1708)  Oral Nutrition Promotion:   social interaction promoted   rest periods promoted  Nutrition Interventions: food preferences provided  Goal: Enhanced Social, Occupational or Functional Skills (Excessive Substance Use)  Outcome: Progressing  Intervention: Promote Social, Occupational and Functional Ability  Flowsheets (Taken 11/11/2024 1708)  Trust Relationship/Rapport:   care explained   questions encouraged   reassurance provided  Social Functional Ability Promotion: self-expression encouraged   AAOx4. Flat affect. Anxious and depressed mood. Irritable. Withdrawn and isolative. Minimal interaction with staff and peers. Attended group today. Denies suicidal ideations. Endorses auditory hallucinations. Compliant with medications. No PRNs given. Eating and sleeping well. No agitation or aggression noted. Continue with plan of care and q 15 minute safety checks.

## 2024-11-11 NOTE — PROGRESS NOTES
"11/11/2024  Oscar Canseco   1990   [id number]        Psychiatry Progress Note     Chief Complaint: " I am not sleeping because of the noise"    SUBJECTIVE:   Osacr Canseco is a 34 y.o. male was placed under a PEC at [facility] following presentation by his family due to psychosis, decreased sleep, and worsening hallucinations. The family reported noncompliance with medications, although the patient asserts he has been compliant.   Seen today, reports  medication compliance and stable mood. Denies psychosis. Denies suicidal ideations but continues to report Insomnia. He blames the noise on the unit for not sleeping well. Patient admits he was smoking heavily before this admit, but has been able to abstain from smoking since this admit. He expresses optimism and hopefulness.   He plans to return to his [facility] in [place], [place] post discharge     Current Medications:   Scheduled Meds:    cephALEXin  500 mg Oral Q6H    mupirocin   Topical (Top) BID    nicotine  1 patch Transdermal Daily    OLANZapine zydis  10 mg Oral QHS      PRN Meds:   Current Facility-Administered Medications:     acetaminophen, 650 mg, Oral, Q6H PRN    aluminum-magnesium hydroxide-simethicone, 30 mL, Oral, Q6H PRN    haloperidoL, 10 mg, Oral, Q4H PRN **AND** diphenhydrAMINE, 50 mg, Oral, Q4H PRN **AND** LORazepam, 2 mg, Oral, Q4H PRN **AND** haloperidol lactate, 10 mg, Intramuscular, Q4H PRN **AND** diphenhydrAMINE, 50 mg, Intramuscular, Q4H PRN **AND** lorazepam, 2 mg, Intramuscular, Q4H PRN    hydrOXYzine pamoate, 25 mg, Oral, Q6H PRN    hydrOXYzine HCL, 50 mg, Oral, Q4H PRN    magnesium hydroxide 400 mg/5 ml, 30 mL, Oral, Daily PRN    ondansetron, 4 mg, Oral, Q6H PRN    traZODone, 100 mg, Oral, Nightly PRN   Psychotherapeutics (From admission, onward)      Start     Stop Route Frequency Ordered    11/08/24 2100  OLANZapine zydis disintegrating tablet 10 mg         -- Oral Nightly 11/08/24 0854    11/07/24 1813  haloperidoL tablet 10 mg  " "(Med - Acute  Behavioral Management)        Placed in "And" Linked Group    -- Oral Every 4 hours PRN 11/07/24 1813    11/07/24 1813  LORazepam tablet 2 mg  (Med - Acute  Behavioral Management)        Placed in "And" Linked Group    -- Oral Every 4 hours PRN 11/07/24 1813    11/07/24 1813  haloperidol lactate injection 10 mg  (Med - Acute  Behavioral Management)        Placed in "And" Linked Group    -- IM Every 4 hours PRN 11/07/24 1813    11/07/24 1813  LORazepam injection 2 mg  (Med - Acute  Behavioral Management)        Placed in "And" Linked Group    -- IM Every 4 hours PRN 11/07/24 1813    11/07/24 1813  traZODone tablet 100 mg         -- Oral Nightly PRN 11/07/24 1813            Allergies:   Review of patient's allergies indicates:  No Known Allergies     OBJECTIVE:   Vitals   Vitals:    11/10/24 1915   BP: (!) 144/96   Pulse: 75   Resp: 18   Temp: 98.2 °F (36.8 °C)        Labs/Imaging/Studies:   No results found for this or any previous visit (from the past 36 hours).       Medical Review Of Systems:  Pertinent items are noted in HPI.      Psychiatric Mental Status Exam:  General Appearance: appears stated age, well developed and nourished, adequately groomed and appropriately dressed, in no acute distress  Arousal: alert with clear sensorium  Behavior: normal; cooperative; reasonably friendly, pleasant, and polite; appropriate eye-contact; under good behavioral control  Movements and Motor Activity: no abnormal involuntary movements noted; no tics, no tremors, no akathisia, no dystonia, no evidence of tardive dyskinesia; no psychomotor agitation or retardation  Orientation: intact; oriented fully to person, place, time and situation  Speech: intact; normal rate, rhythm, volume, tone and pitch; conversational, spontaneous, and coherent  Mood: Depressed and Anxious  Affect: mood-congruent  Thought Process: goal-directed  Associations: intact, no loosening of associations  Thought Content and Perceptions: no " suicidal or homicidal ideation, no auditory or visual hallucinations, no paranoid ideation, no ideas of reference, no evidence of delusions or psychosis  Recent and Remote Memory: grossly intact, able to recall relevant and salient information from the recent and remote past; per interview/observation with patient  Attention and Concentration: attentive to conversation; per interview/observation with patient  Fund of Knowledge: aware of current events; based on history, vocabulary, fund of knowledge, syntax, grammar, and content  Insight: intact; based on understanding of severity of illness and HPI  Judgment: adequate; based on patient's behavior and HPI    ASSESSMENT/PLAN:   Problems Addressed/Diagnoses:  MOOD DISORDERS; Bipolar I Disorder, Most Recent Episode Manic: 6.7.4.Severe With Psychotic Features (F31.2)       History reviewed. No pertinent past medical history.      Plan:  Continue current medication and treatment plans  Anticipate discharge and plan accordingly    Expected Disposition Plan: Home      Madhuri Dupree

## 2024-11-12 PROCEDURE — 25000003 PHARM REV CODE 250

## 2024-11-12 PROCEDURE — 25000003 PHARM REV CODE 250: Performed by: PEDIATRICS

## 2024-11-12 PROCEDURE — S4991 NICOTINE PATCH NONLEGEND: HCPCS | Performed by: PSYCHIATRY & NEUROLOGY

## 2024-11-12 PROCEDURE — 25000003 PHARM REV CODE 250: Performed by: FAMILY MEDICINE

## 2024-11-12 PROCEDURE — 12400001 HC PSYCH SEMI-PRIVATE ROOM

## 2024-11-12 PROCEDURE — 25000003 PHARM REV CODE 250: Performed by: PSYCHIATRY & NEUROLOGY

## 2024-11-12 RX ORDER — CLONIDINE HYDROCHLORIDE 0.1 MG/1
0.1 TABLET ORAL ONCE
Status: COMPLETED | OUTPATIENT
Start: 2024-11-12 | End: 2024-11-12

## 2024-11-12 RX ADMIN — CEPHALEXIN 500 MG: 500 CAPSULE ORAL at 08:11

## 2024-11-12 RX ADMIN — HYDROXYZINE PAMOATE 25 MG: 25 CAPSULE ORAL at 04:11

## 2024-11-12 RX ADMIN — CEPHALEXIN 500 MG: 500 CAPSULE ORAL at 01:11

## 2024-11-12 RX ADMIN — MUPIROCIN: 20 OINTMENT TOPICAL at 08:11

## 2024-11-12 RX ADMIN — NICOTINE 1 PATCH: 21 PATCH, EXTENDED RELEASE TRANSDERMAL at 09:11

## 2024-11-12 RX ADMIN — OLANZAPINE 10 MG: 5 TABLET, ORALLY DISINTEGRATING ORAL at 08:11

## 2024-11-12 RX ADMIN — CEPHALEXIN 500 MG: 500 CAPSULE ORAL at 02:11

## 2024-11-12 RX ADMIN — CEPHALEXIN 500 MG: 500 CAPSULE ORAL at 06:11

## 2024-11-12 RX ADMIN — CLONIDINE HYDROCHLORIDE 0.1 MG: 0.1 TABLET ORAL at 02:11

## 2024-11-12 NOTE — PLAN OF CARE
Treatment Team    Pt seem for treatment team today with interdisciplinary team.  Pt is Cooperative with Tx team. Pt denies symptoms at this time. MD did not change pt meds at this time. Treatment teams goals Not met at this time. Pt DC plan is home. DC date scheduled for 11.14.Pt has follow up with [facility]

## 2024-11-12 NOTE — NURSING
Patient claimed of relief from itchiness to bilateral foot  wound and  scattered red spots but not totally resolve  per statement after given Vistaril 25 mg PO.

## 2024-11-12 NOTE — PLAN OF CARE
Problem: Adult Behavioral Health Plan of Care  Goal: Plan of Care Review  Outcome: Progressing  Flowsheets (Taken 11/12/2024 0141)  Patient Agreement with Plan of Care: agrees  Plan of Care Reviewed With: patient  Goal: Patient-Specific Goal (Individualization)  Outcome: Progressing  Flowsheets (Taken 11/12/2024 0141)  Patient Personal Strengths: independent living skills  Patient Vulnerabilities: substance abuse/addiction  Goal: Adheres to Safety Considerations for Self and Others  Outcome: Progressing  Flowsheets (Taken 11/12/2024 0141)  Adheres to Safety Considerations for Self and Others: making progress toward outcome  Intervention: Develop and Maintain Individualized Safety Plan  Flowsheets (Taken 11/12/2024 0141)  Safety Measures:   self-directed behavior promoted   safety rounds completed  Goal: Absence of New-Onset Illness or Injury  Outcome: Progressing  Goal: Optimized Coping Skills in Response to Life Stressors  Outcome: Progressing  Flowsheets (Taken 11/12/2024 0141)  Optimized Coping Skills in Response to Life Stressors: making progress toward outcome  Intervention: Promote Effective Coping Strategies  Flowsheets (Taken 11/12/2024 0141)  Supportive Measures:   active listening utilized   verbalization of feelings encouraged   self-care encouraged  Goal: Develops/Participates in Therapeutic [place] to Support Successful Transition  Outcome: Progressing  Flowsheets (Taken 11/12/2024 0141)  Develops/Participates in Therapeutic [place] to Support Successful Transition: making progress toward outcome  Intervention: Foster Therapeutic [place]  Flowsheets (Taken 11/12/2024 0141)  Trust Relationship/Rapport:   care explained   questions encouraged   reassurance provided  Goal: Rounds/Family Conference  Outcome: Progressing  Flowsheets (Taken 11/12/2024 0141)  Participants:   nursing   milieu/psych techs     Problem: Psychotic Signs/Symptoms  Goal: Improved Behavioral Control (Psychotic  Signs/Symptoms)  Outcome: Progressing  Flowsheets (Taken 11/12/2024 0141)  Mutually Determined Action Steps (Improved Behavioral Control): verbalizes gratifying activity  Intervention: Manage Behavior  Flowsheets (Taken 11/12/2024 0141)  De-Escalation Techniques: quiet time facilitated  Goal: Optimal Cognitive Function (Psychotic Signs/Symptoms)  Outcome: Progressing  Flowsheets (Taken 11/12/2024 0141)  Mutually Determined Action Steps (Optimal Cognitive Function): follows step-by-step instructions  Intervention: Support and Promote Cognitive Ability  Flowsheets (Taken 11/12/2024 0141)  Trust Relationship/Rapport:   care explained   questions encouraged   reassurance provided  Goal: Increased Participation and Engagement (Psychotic Signs/Symptoms)  Outcome: Progressing  Flowsheets (Taken 11/12/2024 0141)  Mutually Determined Action Steps (Increased Participation and Engagement): verbalizes gratifying activity  Intervention: Facilitate Participation and Engagement  Flowsheets (Taken 11/12/2024 0141)  Supportive Measures:   active listening utilized   verbalization of feelings encouraged   self-care encouraged  Diversional Activity: television  Goal: Improved Mood Symptoms (Psychotic Signs/Symptoms)  Outcome: Progressing  Flowsheets (Taken 11/12/2024 0141)  Mutually Determined Action Steps (Improved Mood Symptoms): acknowledges progress  Intervention: Optimize Emotion and Mood  Flowsheets (Taken 11/12/2024 0141)  Supportive Measures:   active listening utilized   verbalization of feelings encouraged   self-care encouraged  Diversional Activity: television  Goal: Improved Psychomotor Symptoms (Psychotic Signs/Symptoms)  Outcome: Progressing  Flowsheets (Taken 11/12/2024 0141)  Mutually Determined Action Steps (Improved Psychomotor Symptoms): adheres to medication regimen  Intervention: Manage Psychomotor Movement  Flowsheets (Taken 11/12/2024 0141)  Activity (Behavioral Health): up ad duncan  Diversional Activity:  television  Goal: Decreased Sensory Symptoms (Psychotic Signs/Symptoms)  Outcome: Progressing  Flowsheets (Taken 11/12/2024 0141)  Mutually Determined Action Steps (Decreased Sensory Symptoms): adheres to medication regimen  Intervention: Minimize and Manage Sensory Impairment  Flowsheets (Taken 11/12/2024 0141)  Sensory Stimulation Regulation: quiet environment promoted  Goal: Improved Sleep (Psychotic Signs/Symptoms)  Outcome: Progressing  Flowsheets (Taken 11/12/2024 0141)  Mutually Determined Action Steps (Improved Sleep): sleeps 4-6 hours at night  Intervention: Promote Healthy Sleep Hygiene  Flowsheets (Taken 11/12/2024 0141)  Sleep Hygiene Promotion:   awakenings minimized   regular sleep pattern promoted  Goal: Enhanced Social, Occupational or Functional Skills (Psychotic Signs/Symptoms)  Outcome: Progressing  Flowsheets (Taken 11/12/2024 0141)  Mutually Determined Action Steps (Enhanced Social, Occupational or Functional Skills): participates in social skills training  Intervention: Promote Social, Occupational and Functional Ability  Flowsheets (Taken 11/12/2024 0141)  Trust Relationship/Rapport:   care explained   questions encouraged   reassurance provided  Social Functional Ability Promotion:   autonomy promoted   self-expression encouraged   social interaction promoted     Problem: Excessive Substance Use  Goal: Optimized Energy Level (Excessive Substance Use)  Outcome: Progressing  Flowsheets (Taken 11/12/2024 0141)  Mutually Determined Action Steps (Optimized Energy Level): grooms self without prompting  Intervention: Optimize Energy Level  Flowsheets (Taken 11/12/2024 0141)  Activity (Behavioral Health): up ad duncan  Diversional Activity: television  Goal: Improved Behavioral Control (Excessive Substance Use)  Outcome: Progressing  Flowsheets (Taken 11/12/2024 0141)  Mutually Determined Action Steps (Improved Behavioral Control): identifies major stressors  Intervention: Promote Behavior and Impulse  Control  Flowsheets (Taken 11/12/2024 0141)  Behavior Management: behavioral plan reviewed  Goal: Increased Participation and Engagement (Excessive Substance Use)  Outcome: Progressing  Flowsheets (Taken 11/12/2024 0141)  Mutually Determined Action Steps (Increased Participation and Engagement): discusses ongoing recovery plan  Intervention: Facilitate Participation and Engagement  Flowsheets (Taken 11/12/2024 0141)  Supportive Measures:   active listening utilized   verbalization of feelings encouraged   self-care encouraged  Diversional Activity: television  Goal: Improved Physiologic Symptoms (Excessive Substance Use)  Outcome: Progressing  Flowsheets (Taken 11/12/2024 0141)  Mutually Determined Action Steps (Improved Physiologic Symptoms): discusses use pattern  Intervention: Optimize Physiologic Function  Flowsheets (Taken 11/12/2024 0141)  Oral Nutrition Promotion:   social interaction promoted   physical activity promoted  Goal: Enhanced Social, Occupational or Functional Skills (Excessive Substance Use)  Outcome: Progressing  Flowsheets (Taken 11/12/2024 0141)  Mutually Determined Action Steps (Enhanced Social, Occupational or Functional Skills): participates in social skills training  Intervention: Promote Social, Occupational and Functional Ability  Flowsheets (Taken 11/12/2024 0141)  Trust Relationship/Rapport:   care explained   questions encouraged   reassurance provided  Social Functional Ability Promotion:   autonomy promoted   self-expression encouraged   social interaction promoted     AAOx4 Pt denies SI/HI. Pt denies having anxiety and depression, reports poor sleep and good appetite. Pt has good eye contact and affect is flat, remains withdrawn to self, paces halls. Q15 min safety checks continued. Pt can be seen to respond to internal stimulation, denies having hallucinations.

## 2024-11-12 NOTE — NURSING
Patient stated he is getting better mood wise , more alert and conversant with staff. No noted episode of responding with internal stimuli. Denies SI and HI, denies anxiety and depression.

## 2024-11-12 NOTE — PLAN OF CARE
Oscar attended interdisciplinary treatment team, was pleasant and cooperative, reporting improvement, and is progressing towards reported treatment goal of social skills, I need to learn how to talk and be around people. CTRS reported to interdisciplinary team that Oscar attends TR groups, is cooperative, interacts well with peers and staff, and attends his ADL's with improved insight.

## 2024-11-12 NOTE — PROGRESS NOTES
11/12/24 [address] [place] [address] Group Therapy   Group Name Therapeutic Recreation   Specific Interventions Skilled Activity Leisure Education and Awareness   Participation Level Active;Supportive;Appropriate;Attentive;Sharing   Participation Quality Cooperative;Social   Insight/Motivation Improved;Applies New Skills   Affect/Mood Display Appropriate;Restless;Impulsive   Cognition Alert   Psychomotor WNL

## 2024-11-12 NOTE — PLAN OF CARE
Problem: Adult Behavioral Health Plan of Care  Goal: Plan of Care Review  Outcome: Progressing  Flowsheets (Taken 11/12/2024 1527)  Patient Agreement with Plan of Care: agrees  Plan of Care Reviewed With: patient

## 2024-11-12 NOTE — H&P
CONSULT NOTE    Admit Date: 11/7/2024   LOS: 5 days     SUBJECTIVE:     Follow-up For:  hypertension     Scheduled Meds:   cephALEXin  500 mg Oral Q6H    mupirocin   Topical (Top) BID    nicotine  1 patch Transdermal Daily    OLANZapine zydis  10 mg Oral QHS     Continuous Infusions:  PRN Meds:  Current Facility-Administered Medications:     acetaminophen, 650 mg, Oral, Q6H PRN    aluminum-magnesium hydroxide-simethicone, 30 mL, Oral, Q6H PRN    haloperidoL, 10 mg, Oral, Q4H PRN **AND** diphenhydrAMINE, 50 mg, Oral, Q4H PRN **AND** LORazepam, 2 mg, Oral, Q4H PRN **AND** haloperidol lactate, 10 mg, Intramuscular, Q4H PRN **AND** diphenhydrAMINE, 50 mg, Intramuscular, Q4H PRN **AND** lorazepam, 2 mg, Intramuscular, Q4H PRN    hydrOXYzine pamoate, 25 mg, Oral, Q6H PRN    hydrOXYzine HCL, 50 mg, Oral, Q4H PRN    magnesium hydroxide 400 mg/5 ml, 30 mL, Oral, Daily PRN    ondansetron, 4 mg, Oral, Q6H PRN    traZODone, 100 mg, Oral, Nightly PRN    Review of patient's allergies indicates:  No Known Allergies    Review of Systems  Blood pressure has been elevated     OBJECTIVE:     Vital Signs (Most Recent)  Temp: 97.9 °F (36.6 °C) (11/12/24 0701)  Pulse: 92 (11/12/24 0701)  Resp: 19 (11/12/24 0701)  BP: (!) 140/100 (11/12/24 0701)  SpO2: 100 % (11/12/24 0701)    Vital Signs Range (Last 24H):  Temp:  [97.9 °F (36.6 °C)]   Pulse:  [92]   Resp:  [19]   BP: (140)/(100)   SpO2:  [100 %]     I & O (Last 24H):No intake or output data in the 24 hours ending 11/12/24 1258  Physical Exam:  Physical Exam blood pressure elevated     Laboratory:  No results found for this or any previous visit (from the past 24 hours).         ASSESSMENT/PLAN:     Essential hypertension

## 2024-11-12 NOTE — PROGRESS NOTES
"11/12/2024  Oscar Canseco   1990   [id number]        Psychiatry Progress Note     Chief Complaint: " I am not sleeping because of the noise"    SUBJECTIVE:   Oscar Canseco is a 34 y.o. male was placed under a PEC at [facility] following presentation by his family due to psychosis, decreased sleep, and worsening hallucinations. The family reported noncompliance with medications, although the patient asserts he has been compliant.       Today, patient reports that he is feeling much more clear.  Remains medication compliant and stable mood. Denies psychosis. Denies suicidal ideations. Tolerating medications well without issue. Staff report that he has been attending group and cooperative with staff and peers. He plans to return to his [facility] in [place], [place] post discharge. Will continue with current POC.      Current Medications:   Scheduled Meds:    cephALEXin  500 mg Oral Q6H    mupirocin   Topical (Top) BID    nicotine  1 patch Transdermal Daily    OLANZapine zydis  10 mg Oral QHS      PRN Meds:   Current Facility-Administered Medications:     acetaminophen, 650 mg, Oral, Q6H PRN    aluminum-magnesium hydroxide-simethicone, 30 mL, Oral, Q6H PRN    haloperidoL, 10 mg, Oral, Q4H PRN **AND** diphenhydrAMINE, 50 mg, Oral, Q4H PRN **AND** LORazepam, 2 mg, Oral, Q4H PRN **AND** haloperidol lactate, 10 mg, Intramuscular, Q4H PRN **AND** diphenhydrAMINE, 50 mg, Intramuscular, Q4H PRN **AND** lorazepam, 2 mg, Intramuscular, Q4H PRN    hydrOXYzine pamoate, 25 mg, Oral, Q6H PRN    hydrOXYzine HCL, 50 mg, Oral, Q4H PRN    magnesium hydroxide 400 mg/5 ml, 30 mL, Oral, Daily PRN    ondansetron, 4 mg, Oral, Q6H PRN    traZODone, 100 mg, Oral, Nightly PRN   Psychotherapeutics (From admission, onward)      Start     Stop Route Frequency Ordered    11/08/24 2100  OLANZapine zydis disintegrating tablet 10 mg         -- Oral Nightly 11/08/24 0854    11/07/24 1813  haloperidoL tablet 10 mg  (Med - Acute  Behavioral Management) " "       Placed in "And" Linked Group    -- Oral Every 4 hours PRN 11/07/24 1813    11/07/24 1813  LORazepam tablet 2 mg  (Med - Acute  Behavioral Management)        Placed in "And" Linked Group    -- Oral Every 4 hours PRN 11/07/24 1813    11/07/24 1813  haloperidol lactate injection 10 mg  (Med - Acute  Behavioral Management)        Placed in "And" Linked Group    -- IM Every 4 hours PRN 11/07/24 1813    11/07/24 1813  LORazepam injection 2 mg  (Med - Acute  Behavioral Management)        Placed in "And" Linked Group    -- IM Every 4 hours PRN 11/07/24 1813    11/07/24 1813  traZODone tablet 100 mg         -- Oral Nightly PRN 11/07/24 1813            Allergies:   Review of patient's allergies indicates:  No Known Allergies     OBJECTIVE:   Vitals   Vitals:    11/12/24 0701   BP: (!) 140/100   Pulse: 92   Resp: 19   Temp: 97.9 °F (36.6 °C)        Labs/Imaging/Studies:   No results found for this or any previous visit (from the past 36 hours).       Medical Review Of Systems:  Pertinent items are noted in HPI.      Psychiatric Mental Status Exam:  General Appearance: appears stated age, well developed and nourished, adequately groomed and appropriately dressed, in no acute distress  Arousal: alert with clear sensorium  Behavior: normal; cooperative; reasonably friendly, pleasant, and polite; appropriate eye-contact; under good behavioral control  Movements and Motor Activity: no abnormal involuntary movements noted; no tics, no tremors, no akathisia, no dystonia, no evidence of tardive dyskinesia; no psychomotor agitation or retardation  Orientation: intact; oriented fully to person, place, time and situation  Speech: intact; normal rate, rhythm, volume, tone and pitch; conversational, spontaneous, and coherent  Mood: Depressed and Anxious  Affect: mood-congruent  Thought Process: goal-directed  Associations: intact, no loosening of associations  Thought Content and Perceptions: no suicidal or homicidal ideation, no " auditory or visual hallucinations, no paranoid ideation, no ideas of reference, no evidence of delusions or psychosis  Recent and Remote Memory: grossly intact, able to recall relevant and salient information from the recent and remote past; per interview/observation with patient  Attention and Concentration: attentive to conversation; per interview/observation with patient  Fund of Knowledge: aware of current events; based on history, vocabulary, fund of knowledge, syntax, grammar, and content  Insight: intact; based on understanding of severity of illness and HPI  Judgment: adequate; based on patient's behavior and HPI    ASSESSMENT/PLAN:   Problems Addressed/Diagnoses:  MOOD DISORDERS; Bipolar I Disorder, Most Recent Episode Manic: 6.7.4.Severe With Psychotic Features (F31.2)       History reviewed. No pertinent past medical history.      Plan:  Continue current medication and treatment plans  Anticipate discharge and plan accordingly    Expected Disposition Plan: Home      Ahsan ROSS-BC

## 2024-11-13 PROCEDURE — 12400001 HC PSYCH SEMI-PRIVATE ROOM

## 2024-11-13 PROCEDURE — 25000003 PHARM REV CODE 250: Performed by: FAMILY MEDICINE

## 2024-11-13 PROCEDURE — 25000003 PHARM REV CODE 250: Performed by: PEDIATRICS

## 2024-11-13 PROCEDURE — 25000003 PHARM REV CODE 250: Performed by: PSYCHIATRY & NEUROLOGY

## 2024-11-13 PROCEDURE — S4991 NICOTINE PATCH NONLEGEND: HCPCS | Performed by: PSYCHIATRY & NEUROLOGY

## 2024-11-13 PROCEDURE — 25000003 PHARM REV CODE 250

## 2024-11-13 RX ORDER — IBUPROFEN 600 MG/1
600 TABLET ORAL EVERY 8 HOURS PRN
Status: DISCONTINUED | OUTPATIENT
Start: 2024-11-13 | End: 2024-11-14 | Stop reason: HOSPADM

## 2024-11-13 RX ADMIN — TRAZODONE HYDROCHLORIDE 100 MG: 100 TABLET ORAL at 09:11

## 2024-11-13 RX ADMIN — OLANZAPINE 10 MG: 5 TABLET, ORALLY DISINTEGRATING ORAL at 08:11

## 2024-11-13 RX ADMIN — MUPIROCIN: 20 OINTMENT TOPICAL at 09:11

## 2024-11-13 RX ADMIN — MUPIROCIN: 20 OINTMENT TOPICAL at 08:11

## 2024-11-13 RX ADMIN — HYDROXYZINE HYDROCHLORIDE 50 MG: 50 TABLET, FILM COATED ORAL at 09:11

## 2024-11-13 RX ADMIN — CEPHALEXIN 500 MG: 500 CAPSULE ORAL at 12:11

## 2024-11-13 RX ADMIN — IBUPROFEN 600 MG: 600 TABLET, FILM COATED ORAL at 11:11

## 2024-11-13 RX ADMIN — CEPHALEXIN 500 MG: 500 CAPSULE ORAL at 06:11

## 2024-11-13 RX ADMIN — CEPHALEXIN 500 MG: 500 CAPSULE ORAL at 11:11

## 2024-11-13 RX ADMIN — NICOTINE 1 PATCH: 21 PATCH, EXTENDED RELEASE TRANSDERMAL at 09:11

## 2024-11-13 RX ADMIN — CEPHALEXIN 500 MG: 500 CAPSULE ORAL at 08:11

## 2024-11-13 NOTE — PLAN OF CARE
Problem: Adult Behavioral Health Plan of Care  Goal: Plan of Care Review  Outcome: Progressing  Flowsheets (Taken 11/12/2024 2011)  Patient Agreement with Plan of Care: agrees  Plan of Care Reviewed With: patient  Goal: Patient-Specific Goal (Individualization)  Outcome: Progressing  Flowsheets (Taken 11/12/2024 2011)  Patient Personal Strengths: independent living skills  Patient Vulnerabilities: substance abuse/addiction  Goal: Adheres to Safety Considerations for Self and Others  Outcome: Progressing  Flowsheets (Taken 11/12/2024 2011)  Adheres to Safety Considerations for Self and Others: making progress toward outcome  Intervention: Develop and Maintain Individualized Safety Plan  Flowsheets (Taken 11/12/2024 2011)  Safety Measures:   self-directed behavior promoted   safety rounds completed  Goal: Absence of New-Onset Illness or Injury  Outcome: Progressing  Goal: Optimized Coping Skills in Response to Life Stressors  Outcome: Progressing  Flowsheets (Taken 11/12/2024 2011)  Optimized Coping Skills in Response to Life Stressors: making progress toward outcome  Intervention: Promote Effective Coping Strategies  Flowsheets (Taken 11/12/2024 2011)  Supportive Measures:   active listening utilized   verbalization of feelings encouraged   self-care encouraged  Goal: Develops/Participates in Therapeutic [place] to Support Successful Transition  Outcome: Progressing  Flowsheets (Taken 11/12/2024 2011)  Develops/Participates in Therapeutic [place] to Support Successful Transition: making progress toward outcome  Intervention: Foster Therapeutic [place]  Flowsheets (Taken 11/12/2024 2011)  Trust Relationship/Rapport:   care explained   questions encouraged   reassurance provided  Goal: Rounds/Family Conference  Outcome: Progressing  Flowsheets (Taken 11/12/2024 2011)  Participants:   nursing   milieu/psych techs     Problem: Psychotic Signs/Symptoms  Goal: Improved Behavioral Control (Psychotic  Signs/Symptoms)  Outcome: Progressing  Flowsheets (Taken 11/12/2024 2011)  Mutually Determined Action Steps (Improved Behavioral Control): verbalizes gratifying activity  Intervention: Manage Behavior  Flowsheets (Taken 11/12/2024 2011)  De-Escalation Techniques: quiet time facilitated  Goal: Optimal Cognitive Function (Psychotic Signs/Symptoms)  Outcome: Progressing  Flowsheets (Taken 11/12/2024 2011)  Mutually Determined Action Steps (Optimal Cognitive Function): remains focused during activity  Intervention: Support and Promote Cognitive Ability  Flowsheets (Taken 11/12/2024 2011)  Trust Relationship/Rapport:   care explained   questions encouraged   reassurance provided  Goal: Increased Participation and Engagement (Psychotic Signs/Symptoms)  Outcome: Progressing  Flowsheets (Taken 11/12/2024 2011)  Mutually Determined Action Steps (Increased Participation and Engagement): verbalizes gratifying activity  Intervention: Facilitate Participation and Engagement  Flowsheets (Taken 11/12/2024 2011)  Supportive Measures:   active listening utilized   verbalization of feelings encouraged   self-care encouraged  Diversional Activity: television  Goal: Improved Mood Symptoms (Psychotic Signs/Symptoms)  Outcome: Progressing  Flowsheets (Taken 11/12/2024 2011)  Mutually Determined Action Steps (Improved Mood Symptoms): acknowledges progress  Intervention: Optimize Emotion and Mood  Flowsheets (Taken 11/12/2024 2011)  Supportive Measures:   active listening utilized   verbalization of feelings encouraged   self-care encouraged  Diversional Activity: television  Goal: Improved Psychomotor Symptoms (Psychotic Signs/Symptoms)  Outcome: Progressing  Flowsheets (Taken 11/12/2024 2011)  Mutually Determined Action Steps (Improved Psychomotor Symptoms): adheres to medication regimen  Intervention: Manage Psychomotor Movement  Flowsheets (Taken 11/12/2024 2011)  Activity (Behavioral Health): up ad duncan  Diversional Activity:  television  Goal: Decreased Sensory Symptoms (Psychotic Signs/Symptoms)  Outcome: Progressing  Flowsheets (Taken 11/12/2024 2011)  Mutually Determined Action Steps (Decreased Sensory Symptoms): adheres to medication regimen  Intervention: Minimize and Manage Sensory Impairment  Flowsheets (Taken 11/12/2024 2011)  Sensory Stimulation Regulation: quiet environment promoted  Goal: Improved Sleep (Psychotic Signs/Symptoms)  Outcome: Progressing  Flowsheets (Taken 11/12/2024 2011)  Mutually Determined Action Steps (Improved Sleep): sleeps 4-6 hours at night  Intervention: Promote Healthy Sleep Hygiene  Flowsheets (Taken 11/12/2024 2011)  Sleep Hygiene Promotion:   awakenings minimized   regular sleep pattern promoted  Goal: Enhanced Social, Occupational or Functional Skills (Psychotic Signs/Symptoms)  Outcome: Progressing  Flowsheets (Taken 11/12/2024 2011)  Mutually Determined Action Steps (Enhanced Social, Occupational or Functional Skills): participates in social skills training  Intervention: Promote Social, Occupational and Functional Ability  Flowsheets (Taken 11/12/2024 2011)  Trust Relationship/Rapport:   care explained   questions encouraged   reassurance provided  Social Functional Ability Promotion:   autonomy promoted   self-expression encouraged   social interaction promoted     Problem: Excessive Substance Use  Goal: Optimized Energy Level (Excessive Substance Use)  Outcome: Progressing  Flowsheets (Taken 11/12/2024 2011)  Mutually Determined Action Steps (Optimized Energy Level): grooms self without prompting  Intervention: Optimize Energy Level  Flowsheets (Taken 11/12/2024 2011)  Activity (Behavioral Health): up ad [place]  Diversional Activity: television  Goal: Improved Behavioral Control (Excessive Substance Use)  Outcome: Progressing  Flowsheets (Taken 11/12/2024 2011)  Mutually Determined Action Steps (Improved Behavioral Control): identifies major stressors  Intervention: Promote Behavior and Impulse  Control  Flowsheets (Taken 11/12/2024 2011)  Behavior Management: behavioral plan reviewed  Goal: Increased Participation and Engagement (Excessive Substance Use)  Outcome: Progressing  Flowsheets (Taken 11/12/2024 2011)  Mutually Determined Action Steps (Increased Participation and Engagement): discusses ongoing recovery plan  Intervention: Facilitate Participation and Engagement  Flowsheets (Taken 11/12/2024 2011)  Supportive Measures:   active listening utilized   verbalization of feelings encouraged   self-care encouraged  Diversional Activity: television  Goal: Improved Physiologic Symptoms (Excessive Substance Use)  Outcome: Progressing  Flowsheets (Taken 11/12/2024 2011)  Mutually Determined Action Steps (Improved Physiologic Symptoms): discusses use pattern  Intervention: Optimize Physiologic Function  Flowsheets (Taken 11/12/2024 2011)  Oral Nutrition Promotion:   social interaction promoted   physical activity promoted  Goal: Enhanced Social, Occupational or Functional Skills (Excessive Substance Use)  Outcome: Progressing  Flowsheets (Taken 11/12/2024 2011)  Mutually Determined Action Steps (Enhanced Social, Occupational or Functional Skills): participates in social skills training  Intervention: Promote Social, Occupational and Functional Ability  Flowsheets (Taken 11/12/2024 2011)  Trust Relationship/Rapport:   care explained   questions encouraged   reassurance provided  Social Functional Ability Promotion:   autonomy promoted   self-expression encouraged   social interaction promoted     AAOx4 Pt denies SI/HI. Pt denies having anxiety and depression, reports poor sleep and good appetite. Pt has good eye contact and affect is flat, remains withdrawn to self, paces halls. Q15 min safety checks continued. Withdrawn to self, isolative.

## 2024-11-13 NOTE — PLAN OF CARE
Problem: Adult Behavioral Health Plan of Care  Goal: Plan of Care Review  Outcome: Progressing  Flowsheets (Taken 11/13/2024 0958)  Patient Agreement with Plan of Care: agrees  Plan of Care Reviewed With: patient  Goal: Patient-Specific Goal (Individualization)  Outcome: Progressing  Flowsheets (Taken 11/13/2024 0958)  Patient Personal Strengths: independent living skills  Patient Vulnerabilities: substance abuse/addiction  Goal: Adheres to Safety Considerations for Self and Others  Outcome: Progressing  Flowsheets (Taken 11/13/2024 0958)  Adheres to Safety Considerations for Self and Others: making progress toward outcome  Intervention: Develop and Maintain Individualized Safety Plan  Flowsheets (Taken 11/13/2024 0958)  Safety Measures: safety rounds completed  Goal: Absence of New-Onset Illness or Injury  Outcome: Progressing  Intervention: Identify and Manage Fall Risk  Flowsheets (Taken 11/13/2024 0958)  Safety Measures: safety rounds completed  Intervention: Prevent VTE (Venous Thromboembolism)  Flowsheets (Taken 11/13/2024 0958)  VTE Prevention/Management: fluids promoted  Intervention: Prevent Infection  Flowsheets (Taken 11/13/2024 0958)  Infection Prevention: rest/sleep promoted  Goal: Optimized Coping Skills in Response to Life Stressors  Outcome: Progressing  Flowsheets (Taken 11/13/2024 0958)  Optimized Coping Skills in Response to Life Stressors: making progress toward outcome  Intervention: Promote Effective Coping Strategies  Flowsheets (Taken 11/13/2024 0958)  Supportive Measures: self-care encouraged  Goal: Develops/Participates in Therapeutic [place] to Support Successful Transition  Outcome: Progressing  Flowsheets (Taken 11/13/2024 0958)  Develops/Participates in Therapeutic [place] to Support Successful Transition: making progress toward outcome  Intervention: Foster Therapeutic [place]  Flowsheets (Taken 11/13/2024 0958)  Trust Relationship/Rapport: care explained  Goal: Rounds/Family  Conference  Outcome: Progressing  Flowsheets (Taken 11/13/2024 0958)  Participants:   nursing   milieu/psych techs     Problem: Psychotic Signs/Symptoms  Goal: Improved Behavioral Control (Psychotic Signs/Symptoms)  Outcome: Progressing  Flowsheets (Taken 11/13/2024 0958)  Mutually Determined Action Steps (Improved Behavioral Control): verbalizes gratifying activity  Intervention: Manage Behavior  Flowsheets (Taken 11/13/2024 0958)  De-Escalation Techniques: quiet time facilitated  Goal: Optimal Cognitive Function (Psychotic Signs/Symptoms)  Outcome: Progressing  Flowsheets (Taken 11/13/2024 0958)  Mutually Determined Action Steps (Optimal Cognitive Function): remains focused during activity  Intervention: Support and Promote Cognitive Ability  Flowsheets (Taken 11/13/2024 0958)  Trust Relationship/Rapport: care explained  Goal: Increased Participation and Engagement (Psychotic Signs/Symptoms)  Outcome: Progressing  Flowsheets (Taken 11/13/2024 0958)  Mutually Determined Action Steps (Increased Participation and Engagement): verbalizes gratifying activity  Intervention: Facilitate Participation and Engagement  Flowsheets (Taken 11/13/2024 0958)  Supportive Measures: self-care encouraged  Diversional Activity: television  Goal: Improved Mood Symptoms (Psychotic Signs/Symptoms)  Outcome: Progressing  Flowsheets (Taken 11/13/2024 0958)  Mutually Determined Action Steps (Improved Mood Symptoms): acknowledges progress  Intervention: Optimize Emotion and Mood  Flowsheets (Taken 11/13/2024 0958)  Supportive Measures: self-care encouraged  Diversional Activity: television  Goal: Improved Psychomotor Symptoms (Psychotic Signs/Symptoms)  Outcome: Progressing  Flowsheets (Taken 11/13/2024 0958)  Mutually Determined Action Steps (Improved Psychomotor Symptoms): adheres to medication regimen  Intervention: Manage Psychomotor Movement  Flowsheets (Taken 11/13/2024 0958)  Activity (Behavioral Health): up ad [place]  Patient Performed  Hygiene: teeth brushed  Diversional Activity: television  Goal: Decreased Sensory Symptoms (Psychotic Signs/Symptoms)  Outcome: Progressing  Flowsheets (Taken 11/13/2024 0958)  Mutually Determined Action Steps (Decreased Sensory Symptoms): adheres to medication regimen  Intervention: Minimize and Manage Sensory Impairment  Flowsheets (Taken 11/13/2024 0958)  Sensory Stimulation Regulation: quiet environment promoted  Goal: Improved Sleep (Psychotic Signs/Symptoms)  Outcome: Progressing  Flowsheets (Taken 11/13/2024 0958)  Mutually Determined Action Steps (Improved Sleep): sleeps 4-6 hours at night  Intervention: Promote Healthy Sleep Hygiene  Flowsheets (Taken 11/13/2024 0958)  Sleep Hygiene Promotion: regular sleep pattern promoted  Goal: Enhanced Social, Occupational or Functional Skills (Psychotic Signs/Symptoms)  Outcome: Progressing  Flowsheets (Taken 11/13/2024 0958)  Mutually Determined Action Steps (Enhanced Social, Occupational or Functional Skills): participates in social skills training  Intervention: Promote Social, Occupational and Functional Ability  Flowsheets (Taken 11/13/2024 0958)  Trust Relationship/Rapport: care explained  Social Functional Ability Promotion: autonomy promoted     Problem: Excessive Substance Use  Goal: Optimized Energy Level (Excessive Substance Use)  Outcome: Progressing  Flowsheets (Taken 11/13/2024 0958)  Mutually Determined Action Steps (Optimized Energy Level): grooms self without prompting  Intervention: Optimize Energy Level  Flowsheets (Taken 11/13/2024 0958)  Activity (Behavioral Health): up ad duncan  Patient Performed Hygiene: teeth brushed  Diversional Activity: television  Goal: Improved Behavioral Control (Excessive Substance Use)  Outcome: Progressing  Flowsheets (Taken 11/13/2024 0958)  Mutually Determined Action Steps (Improved Behavioral Control): identifies major stressors  Intervention: Promote Behavior and Impulse Control  Flowsheets (Taken 11/13/2024  0958)  Behavior Management: behavioral plan reviewed  Goal: Increased Participation and Engagement (Excessive Substance Use)  Outcome: Progressing  Flowsheets (Taken 11/13/2024 0958)  Mutually Determined Action Steps (Increased Participation and Engagement): discusses ongoing recovery plan  Intervention: Facilitate Participation and Engagement  Flowsheets (Taken 11/13/2024 0958)  Supportive Measures: self-care encouraged  Diversional Activity: television  Goal: Improved Physiologic Symptoms (Excessive Substance Use)  Outcome: Progressing  Flowsheets (Taken 11/13/2024 0958)  Mutually Determined Action Steps (Improved Physiologic Symptoms): discusses use pattern  Intervention: Optimize Physiologic Function  Flowsheets (Taken 11/13/2024 0958)  Oral Nutrition Promotion: rest periods promoted  Nutrition Interventions: food preferences provided  Goal: Enhanced Social, Occupational or Functional Skills (Excessive Substance Use)  Outcome: Progressing  Flowsheets (Taken 11/13/2024 0958)  Mutually Determined Action Steps (Enhanced Social, Occupational or Functional Skills): participates in social skills training  Intervention: Promote Social, Occupational and Functional Ability  Flowsheets (Taken 11/13/2024 0958)  Trust Relationship/Rapport: care explained  Social Functional Ability Promotion: autonomy promoted    He is AAO X 4. Flat affect and blunted mood. Anxious. He is observed to be pacing in the terrell. He denies depression, SI, HI, and hallucinations. Good eye contact noted. Speech normal tone and speed. Medication compliant this AM. Interacts with selected patients and staff. Continue plan of care and provide a safe and therapeutic environment. Continue to monitor every fifteen minutes for safety.

## 2024-11-14 VITALS
RESPIRATION RATE: 20 BRPM | BODY MASS INDEX: 25.78 KG/M2 | WEIGHT: 190.38 LBS | OXYGEN SATURATION: 98 % | HEIGHT: 72 IN | SYSTOLIC BLOOD PRESSURE: 127 MMHG | HEART RATE: 70 BPM | DIASTOLIC BLOOD PRESSURE: 84 MMHG | TEMPERATURE: 98 F

## 2024-11-14 PROCEDURE — 25000003 PHARM REV CODE 250: Performed by: FAMILY MEDICINE

## 2024-11-14 RX ORDER — CEPHALEXIN 500 MG/1
500 CAPSULE ORAL EVERY 6 HOURS
Qty: 8 CAPSULE | Refills: 0 | Status: SHIPPED | OUTPATIENT
Start: 2024-11-14 | End: 2024-11-16

## 2024-11-14 RX ORDER — IBUPROFEN 200 MG
1 TABLET ORAL DAILY
Qty: 30 PATCH | Refills: 0 | Status: SHIPPED | OUTPATIENT
Start: 2024-11-14 | End: 2024-12-14

## 2024-11-14 RX ORDER — OLANZAPINE 10 MG/1
10 TABLET, ORALLY DISINTEGRATING ORAL NIGHTLY
Qty: 30 TABLET | Refills: 0 | Status: SHIPPED | OUTPATIENT
Start: 2024-11-14 | End: 2024-12-14

## 2024-11-14 RX ADMIN — MUPIROCIN: 20 OINTMENT TOPICAL at 08:11

## 2024-11-14 RX ADMIN — CEPHALEXIN 500 MG: 500 CAPSULE ORAL at 06:11

## 2024-11-14 NOTE — PLAN OF CARE
Problem: Adult Behavioral Health Plan of Care  Goal: Plan of Care Review  Outcome: Met  Goal: Patient-Specific Goal (Individualization)  Outcome: Met  Goal: Adheres to Safety Considerations for Self and Others  Outcome: Met  Goal: Absence of New-Onset Illness or Injury  Outcome: Met  Goal: Optimized Coping Skills in Response to Life Stressors  Outcome: Met  Goal: Develops/Participates in Therapeutic [place] to Support Successful Transition  Outcome: Met  Goal: Rounds/Family Conference  Outcome: Met     Problem: Psychotic Signs/Symptoms  Goal: Improved Behavioral Control (Psychotic Signs/Symptoms)  Outcome: Met  Goal: Optimal Cognitive Function (Psychotic Signs/Symptoms)  Outcome: Met  Goal: Increased Participation and Engagement (Psychotic Signs/Symptoms)  Outcome: Met  Goal: Improved Mood Symptoms (Psychotic Signs/Symptoms)  Outcome: Met  Goal: Improved Psychomotor Symptoms (Psychotic Signs/Symptoms)  Outcome: Met  Goal: Decreased Sensory Symptoms (Psychotic Signs/Symptoms)  Outcome: Met  Goal: Improved Sleep (Psychotic Signs/Symptoms)  Outcome: Met  Goal: Enhanced Social, Occupational or Functional Skills (Psychotic Signs/Symptoms)  Outcome: Met     Problem: Excessive Substance Use  Goal: Optimized Energy Level (Excessive Substance Use)  Outcome: Met  Goal: Improved Behavioral Control (Excessive Substance Use)  Outcome: Met  Goal: Increased Participation and Engagement (Excessive Substance Use)  Outcome: Met  Goal: Improved Physiologic Symptoms (Excessive Substance Use)  Outcome: Met  Goal: Enhanced Social, Occupational or Functional Skills (Excessive Substance Use)  Outcome: Met

## 2024-11-14 NOTE — NURSING
Discharge Note:    Oscar Canseco is a 34 y.o. male, : 1990, MRN: [id number], admitted on 2024 for Wil Virgen MD with a diagnosis of Psychosis [F29].    Patient discharged on 2024 per physician orders in stable condition. Patient denied suicidal ideation, homicidal ideation, or hallucinations. Patient was discharged with valuables, personal belongings, prescriptions, discharge instructions, and an educational handout explaining the diagnosis and prescribed medications. Patient verbalized understanding of the discharge instructions and importance of follow-up visits. Patient was escorted out of the facility by [facility] and placed into a private vehicle to be transported to home.     Patient discharged on the following medications:     Medication List        START taking these medications      cephALEXin 500 MG capsule  Commonly known as: KEFLEX  Take 1 capsule (500 mg total) by mouth every 6 (six) hours. for 2 days     nicotine 21 mg/24 hr  Commonly known as: NICODERM CQ  Place 1 patch onto the skin once daily.     OLANZapine zydis 10 MG Tbdl  Commonly known as: ZyPREXA  Take 1 tablet (10 mg total) by mouth every evening.  Replaces: OLANZapine 10 MG tablet            STOP taking these medications      fluticasone propionate 50 mcg/actuation nasal spray  Commonly known as: FLONASE     loratadine 10 mg tablet  Commonly known as: CLARITIN     OLANZapine 10 MG tablet  Commonly known as: ZyPREXA  Replaced by: OLANZapine zydis 10 MG Tbdl               Where to Get Your Medications        You can get these medications from any pharmacy    Bring a paper prescription for each of these medications  cephALEXin 500 MG capsule  nicotine 21 mg/24 hr  OLANZapine zydis 10 MG Tbdl

## 2024-11-14 NOTE — PROGRESS NOTES
"11/14/2024  Oscar Canseco   1990   [id number]        Psychiatry Progress Note     Chief Complaint: " I am not sleeping because of the noise"    SUBJECTIVE:   Oscar Canseco is a 34 y.o. male was placed under a PEC at [facility] following presentation by his family due to psychosis, decreased sleep, and worsening hallucinations. The family reported noncompliance with medications, although the patient asserts he has been compliant.       The patient reports feeling good today and expresses readiness for discharge. He remains medication compliant with a stable mood, denying any psychosis or suicidal ideation. The patient is tolerating his medications without issues. Staff note that he has been attending groups and has been cooperative with both staff and peers. The patient plans to return to his [facility] in [place], [place], following discharge. We will continue with the current plan of care and proceed with discharge home today.     Current Medications:   Scheduled Meds:    cephALEXin  500 mg Oral Q6H    mupirocin   Topical (Top) BID    nicotine  1 patch Transdermal Daily    OLANZapine zydis  10 mg Oral QHS      PRN Meds:   Current Facility-Administered Medications:     acetaminophen, 650 mg, Oral, Q6H PRN    aluminum-magnesium hydroxide-simethicone, 30 mL, Oral, Q6H PRN    haloperidoL, 10 mg, Oral, Q4H PRN **AND** diphenhydrAMINE, 50 mg, Oral, Q4H PRN **AND** LORazepam, 2 mg, Oral, Q4H PRN **AND** haloperidol lactate, 10 mg, Intramuscular, Q4H PRN **AND** diphenhydrAMINE, 50 mg, Intramuscular, Q4H PRN **AND** lorazepam, 2 mg, Intramuscular, Q4H PRN    hydrOXYzine pamoate, 25 mg, Oral, Q6H PRN    hydrOXYzine HCL, 50 mg, Oral, Q4H PRN    ibuprofen, 600 mg, Oral, Q8H PRN    magnesium hydroxide 400 mg/5 ml, 30 mL, Oral, Daily PRN    ondansetron, 4 mg, Oral, Q6H PRN    traZODone, 100 mg, Oral, Nightly PRN   Psychotherapeutics (From admission, onward)      Start     Stop Route Frequency Ordered    11/08/24 2100  " "OLANZapine zydis disintegrating tablet 10 mg         -- Oral Nightly 11/08/24 0854    11/07/24 1813  haloperidoL tablet 10 mg  (Med - Acute  Behavioral Management)        Placed in "And" Linked Group    -- Oral Every 4 hours PRN 11/07/24 1813    11/07/24 1813  LORazepam tablet 2 mg  (Med - Acute  Behavioral Management)        Placed in "And" Linked Group    -- Oral Every 4 hours PRN 11/07/24 1813    11/07/24 1813  haloperidol lactate injection 10 mg  (Med - Acute  Behavioral Management)        Placed in "And" Linked Group    -- IM Every 4 hours PRN 11/07/24 1813    11/07/24 1813  LORazepam injection 2 mg  (Med - Acute  Behavioral Management)        Placed in "And" Linked Group    -- IM Every 4 hours PRN 11/07/24 1813    11/07/24 1813  traZODone tablet 100 mg         -- Oral Nightly PRN 11/07/24 1813            Allergies:   Review of patient's allergies indicates:  No Known Allergies     OBJECTIVE:   Vitals   Vitals:    11/13/24 0730   BP: 122/86   Pulse: 70   Resp: 18   Temp: 97.3 °F (36.3 °C)        Labs/Imaging/Studies:   No results found for this or any previous visit (from the past 36 hours).       Medical Review Of Systems:  Pertinent items are noted in HPI.      Psychiatric Mental Status Exam:  General Appearance: appears stated age, well developed and nourished, adequately groomed and appropriately dressed, in no acute distress  Arousal: alert with clear sensorium  Behavior: normal; cooperative; reasonably friendly, pleasant, and polite; appropriate eye-contact; under good behavioral control  Movements and Motor Activity: no abnormal involuntary movements noted; no tics, no tremors, no akathisia, no dystonia, no evidence of tardive dyskinesia; no psychomotor agitation or retardation  Orientation: intact; oriented fully to person, place, time and situation  Speech: intact; normal rate, rhythm, volume, tone and pitch; conversational, spontaneous, and coherent  Mood: Depressed and Anxious  Affect: " mood-congruent  Thought Process: goal-directed  Associations: intact, no loosening of associations  Thought Content and Perceptions: no suicidal or homicidal ideation, no auditory or visual hallucinations, no paranoid ideation, no ideas of reference, no evidence of delusions or psychosis  Recent and Remote Memory: grossly intact, able to recall relevant and salient information from the recent and remote past; per interview/observation with patient  Attention and Concentration: attentive to conversation; per interview/observation with patient  Fund of Knowledge: aware of current events; based on history, vocabulary, fund of knowledge, syntax, grammar, and content  Insight: intact; based on understanding of severity of illness and HPI  Judgment: adequate; based on patient's behavior and HPI    ASSESSMENT/PLAN:   Problems Addressed/Diagnoses:  MOOD DISORDERS; Bipolar I Disorder, Most Recent Episode Manic: 6.7.4.Severe With Psychotic Features (F31.2)       History reviewed. No pertinent past medical history.      Plan:  Continue current medication and treatment plans  Anticipate discharge and plan accordingly    Expected Disposition Plan: Home      Ahsan ROSS-BC

## 2024-11-14 NOTE — PLAN OF CARE
Problem: Adult Behavioral Health Plan of Care  Goal: Plan of Care Review  Outcome: Progressing  Flowsheets (Taken 11/14/2024 0421)  Patient Agreement with Plan of Care: agrees  Plan of Care Reviewed With: patient  Goal: Patient-Specific Goal (Individualization)  Outcome: Progressing  Flowsheets (Taken 11/14/2024 0421)  Patient Personal Strengths: independent living skills  Patient Vulnerabilities: substance abuse/addiction  Goal: Adheres to Safety Considerations for Self and Others  Outcome: Progressing  Flowsheets (Taken 11/14/2024 0421)  Adheres to Safety Considerations for Self and Others: making progress toward outcome  Intervention: Develop and Maintain Individualized Safety Plan  Flowsheets (Taken 11/14/2024 0421)  Safety Measures:   monitored by video   safety rounds completed  Goal: Absence of New-Onset Illness or Injury  Outcome: Progressing  Intervention: Identify and Manage Fall Risk  Flowsheets (Taken 11/14/2024 0421)  Safety Measures:   monitored by video   safety rounds completed  Intervention: Prevent VTE (Venous Thromboembolism)  Flowsheets (Taken 11/14/2024 0421)  VTE Prevention/Management:   ambulation promoted   fluids promoted  Intervention: Prevent Infection  Flowsheets (Taken 11/14/2024 0421)  Infection Prevention:   hand hygiene promoted   rest/sleep promoted  Goal: Optimized Coping Skills in Response to Life Stressors  Outcome: Progressing  Flowsheets (Taken 11/14/2024 0421)  Optimized Coping Skills in Response to Life Stressors: making progress toward outcome  Intervention: Promote Effective Coping Strategies  Flowsheets (Taken 11/14/2024 0421)  Supportive Measures:   active listening utilized   self-care encouraged   verbalization of feelings encouraged  Goal: Develops/Participates in Therapeutic Arena to Support Successful Transition  Outcome: Progressing  Flowsheets (Taken 11/14/2024 0421)  Develops/Participates in Therapeutic Arena to Support Successful Transition: making  progress toward outcome  Intervention: Foster Therapeutic [place]  Flowsheets (Taken 11/14/2024 0421)  Trust Relationship/Rapport:   care explained   thoughts/feelings acknowledged  Intervention: Mutually Develop Transition Plan  Flowsheets (Taken 11/14/2024 0421)  Current Discharge Risk:   psychiatric illness   substance use/abuse  Outpatient/Agency/Support Group Needs:   outpatient counseling   outpatient medication management  Anticipated Discharge Disposition: home or self-care  Patient/Family Anticipated Services at Transition:   outpatient care   mental health services  Concerns to be Addressed:   medication   mental health   substance/tobacco abuse/use  Goal: Rounds/Family Conference  Outcome: Progressing  Flowsheets (Taken 11/14/2024 0421)  Participants:   milieu/psych techs   nursing     Problem: Psychotic Signs/Symptoms  Goal: Improved Behavioral Control (Psychotic Signs/Symptoms)  Outcome: Progressing  Intervention: Manage Behavior  Flowsheets (Taken 11/14/2024 0421)  De-Escalation Techniques:   medication offered   quiet time facilitated  Goal: Optimal Cognitive Function (Psychotic Signs/Symptoms)  Outcome: Progressing  Intervention: Support and Promote Cognitive Ability  Flowsheets (Taken 11/14/2024 0421)  Trust Relationship/Rapport:   care explained   thoughts/feelings acknowledged  Goal: Increased Participation and Engagement (Psychotic Signs/Symptoms)  Outcome: Progressing  Intervention: Facilitate Participation and Engagement  Flowsheets (Taken 11/14/2024 0421)  Supportive Measures:   active listening utilized   self-care encouraged   verbalization of feelings encouraged  Diversional Activity: television  Goal: Improved Mood Symptoms (Psychotic Signs/Symptoms)  Outcome: Progressing  Flowsheets (Taken 11/14/2024 0421)  Mutually Determined Action Steps (Improved Mood Symptoms): acknowledges progress  Intervention: Optimize Emotion and Mood  Flowsheets (Taken 11/14/2024 0421)  Supportive Measures:    active listening utilized   self-care encouraged   verbalization of feelings encouraged  Diversional Activity: television  Goal: Improved Psychomotor Symptoms (Psychotic Signs/Symptoms)  Outcome: Progressing  Flowsheets (Taken 11/14/2024 0421)  Mutually Determined Action Steps (Improved Psychomotor Symptoms): adheres to medication regimen  Intervention: Manage Psychomotor Movement  Flowsheets (Taken 11/14/2024 0421)  Activity (Behavioral Health): up ad duncan  Diversional Activity: television  Goal: Decreased Sensory Symptoms (Psychotic Signs/Symptoms)  Outcome: Progressing  Flowsheets (Taken 11/14/2024 0421)  Mutually Determined Action Steps (Decreased Sensory Symptoms): adheres to medication regimen  Intervention: Minimize and Manage Sensory Impairment  Flowsheets (Taken 11/14/2024 0421)  Sensory Stimulation Regulation: quiet environment promoted  Goal: Improved Sleep (Psychotic Signs/Symptoms)  Outcome: Progressing  Flowsheets (Taken 11/14/2024 0421)  Mutually Determined Action Steps (Improved Sleep): sleeps 4-6 hours at night  Intervention: Promote Healthy Sleep Hygiene  Flowsheets (Taken 11/14/2024 0421)  Sleep Hygiene Promotion:   awakenings minimized   regular sleep pattern promoted  Goal: Enhanced Social, Occupational or Functional Skills (Psychotic Signs/Symptoms)  Outcome: Progressing  Intervention: Promote Social, Occupational and Functional Ability  Flowsheets (Taken 11/14/2024 0421)  Trust Relationship/Rapport:   care explained   thoughts/feelings acknowledged  Social Functional Ability Promotion: autonomy promoted     Problem: Excessive Substance Use  Goal: Optimized Energy Level (Excessive Substance Use)  Outcome: Progressing  Flowsheets (Taken 11/14/2024 0421)  Mutually Determined Action Steps (Optimized Energy Level): grooms self without prompting  Intervention: Optimize Energy Level  Flowsheets (Taken 11/14/2024 0421)  Activity (Behavioral Health): up ad duncan  Diversional Activity: television  Goal:  Improved Behavioral Control (Excessive Substance Use)  Outcome: Progressing  Flowsheets (Taken 11/14/2024 0421)  Mutually Determined Action Steps (Improved Behavioral Control): identifies major stressors  Intervention: Promote Behavior and Impulse Control  Flowsheets (Taken 11/14/2024 0421)  Behavior Management: behavioral plan reviewed  Goal: Increased Participation and Engagement (Excessive Substance Use)  Outcome: Progressing  Intervention: Facilitate Participation and Engagement  Flowsheets (Taken 11/14/2024 0421)  Supportive Measures:   active listening utilized   self-care encouraged   verbalization of feelings encouraged  Diversional Activity: television  Goal: Improved Physiologic Symptoms (Excessive Substance Use)  Outcome: Progressing  Intervention: Optimize Physiologic Function  Flowsheets (Taken 11/14/2024 0421)  Oral Nutrition Promotion:   social interaction promoted   rest periods promoted  Nutrition Interventions: food preferences provided  Goal: Enhanced Social, Occupational or Functional Skills (Excessive Substance Use)  Outcome: Progressing  Intervention: Promote Social, Occupational and Functional Ability  Flowsheets (Taken 11/14/2024 0421)  Trust Relationship/Rapport:   care explained   thoughts/feelings acknowledged  Social Functional Ability Promotion: autonomy promoted   AAOx4. Flat affect. Anxious and depressed mood. Denies suicidal ideations and hallucinations. Withdrawn and isolative. Minimal interaction with staff and peers. Thought process clearer. Not observed responding to internal stimuli. Answering questions appropriately. Compliant with medications and reports that they are effective. Atarax 50 mg po given for anxiety and Trazodone 100 mg po given for sleep. Reports that he is eating and sleeping well when he takes the Trazodone.  No agitation or aggression noted at this time. Continue with plan of care and q 15 minute safety hecks.

## 2024-11-14 NOTE — PROGRESS NOTES
"[facility] Behavioral Health Unit  Progress Note  Behavioral Medicine Unit    Date: 2024  Time: 9:22 PM    Name: Oscar Canseco    Age: 34 y.o.       : 1990            Today: CC: "I'm pretty good."  C/M, reports sleeping better last night after remembering the nicotine patch that caused night terrors before. Woke up a few times to use the BR. Appetite good, consumes most of meals served with LBM noted . Compliant with meds PO-tolerating. Feels like zyprexa is helping with aggressive thoughts. Comping out of room more for activities, missed some occasional groups unless he needed to rest. Interacts with select peers on the unit. Gait independent and steady, able to make needs known. Less racing thoughts. Can make needs known when he needs to. Gait independent and steady. Denies pain/discomfort.    Current Medications:   No current facility-administered medications on file prior to encounter.     Current Outpatient Medications on File Prior to Encounter   Medication Sig Dispense Refill    fluticasone propionate (FLONASE) 50 mcg/actuation nasal spray 1 spray (50 mcg total) by Each Nostril route once daily. 18.2 mL 3    loratadine (CLARITIN) 10 mg tablet Take 1 tablet (10 mg total) by mouth once daily. 30 tablet 3    OLANZapine (ZYPREXA) 10 MG tablet Take 1 tablet (10 mg total) by mouth every evening. 30 tablet 0       OBJECTIVE:  Vitals (Most Recent)   height is 6' (1.829 m) and weight is 86.4 kg (190 lb 6.4 oz). His temperature is 97.3 °F (36.3 °C). His blood pressure is 122/86 and his pulse is 70. His respiration is 18 and oxygen saturation is 99%.       Mental Status Exam:  Appearance: disheveled, thin & gaunt looking  Behavior/Cooperation: cooperative, psychomotor agitation, eye contact minimal  Speech:  abrupt, minimal  Mood: anxious  Affect: agitated  and anxious  Thought Process: blocked, circumstantial  Thought Content: normal, no suicidality, no homicidality, delusions, " or paranoia  Orientation: grossly intact  Memory: Impaired to some degree  Attention Span/Concentration: Easily distracted and Impaired to some degree  Fund of Knowledge: Aware of current events  Estimate of Intelligence: grossly intact  Cognition: memory > able to remember recent events- Yes, able to remember remote events- No  Insight: limited  Judgment: limited    Diagnoses:  Patient Active Problem List   Diagnosis    Bipolar disorder       Plan:  Cont poc    Thu Taylor NP

## 2024-11-14 NOTE — NURSING
"2130 pt states "I slept all day and now I can't sleep tonight,". Administered PRN atarax and trazodone at this time.     2200 pt noted to be resting quietly in bed with eyes closed. Respirations even/unlabored.   "

## 2024-11-17 PROBLEM — F31.2 BIPOLAR I, MOST RECENT EPISODE MANIC, SEVERE WITH PSYCHOTIC BEHAVIOR: Status: ACTIVE | Noted: 2024-11-17

## 2024-11-17 NOTE — DISCHARGE SUMMARY
DISCHARGE SUMMARY  PSYCHIATRY      Admit Date: 11/7/2024  6:07 PM    Discharge Date:  11/14/2024    SITE:   [facility] *  [facility]    Discharge Attending Physician: Wil Virgen M.D.    Chief Complaint:  Heidi    History of Present Illness On Admit:   Oscar Canseco is a 34-year-old male, was placed under a PEC at [facility] following presentation by his family due to psychosis, decreased sleep, and worsening hallucinations. The family reported noncompliance with medications, although the patient asserts he has been compliant.      Staff noted manic symptoms and unsafe behaviors last night, leading to the administration of an oral PRN, which the patient accepted and appeared to tolerate well. This morning, he presented to the exam room calm and cooperative, though displaying signs of paranoia, evidenced by physical tension and hypervigilance when individuals walked past the room. While attentive to the conversation, he remained oddly observant of his surroundings.     The patient endorses a history of bipolar disorder with psychotic features. He reports previous use of Seroquel at 100 mg, which he found ineffective, and recalls that Zyprexa Zydis was particularly helpful in preventing manic episodes and managing insomnia. The patient states that he has been without sleep for four days and denies experiencing hallucinations today. He describes his manic episodes as times of heightened awareness, during which he perceives hearing people from afar.     Given his reported history and current presentation, I will restart Zyprexa QHS, providing an additional one-time dose now to address his anxiety, paranoia, and continued elevation in mood. He will be admitted for medication management and safety monitoring.      Admit Mental Status Exam:  General Appearance: appears stated age, well developed and nourished, adequately groomed and appropriately dressed, in no acute  distress  Arousal: alert  Behavior: normal; cooperative; reasonably friendly, pleasant, and polite; appropriate eye-contact; under good behavioral control  Movements and Motor Activity: no abnormal involuntary movements noted; no tics, no tremors, no akathisia, no dystonia, no evidence of tardive dyskinesia; no psychomotor agitation or retardation  Orientation: oriented to person, place, time, and situation  Speech: intact; normal rate, rhythm, volume, tone and pitch; conversational, spontaneous, and coherent  Mood: Expansive  Affect: labile  Thought Process: intact, linear, goal-directed, organized, logical  Associations: intact, no loosening of associations  Thought Content and Perceptions: no suicidal or homicidal ideation, no auditory or visual hallucinations, no paranoid ideation, no ideas of reference, no evidence of delusions or psychosis  Recent and Remote Memory: grossly intact, able to recall relevant and salient information from the recent and remote past; per interview/observation with patient  Attention and Concentration: grossly intact, attentive to the conversation and not readily distractible; per interview/observation with patient  Fund of Knowledge: grossly intact, used appropriate vocabulary and demonstrated an awareness of current events; based on history, vocabulary, fund of knowledge, syntax, grammar, and content  Insight: intact; based on understanding of severity of illness and HPI  Judgment: questionable; based on patient's behavior and HPI      Diagnoses:  PRINCIPAL PROBLEM:  Bipolar I, most recent episode manic, severe with psychotic behavior      PROBLEM LIST    Bipolar I, most recent episode manic, severe with psychotic behavior        Hospital Course:   Patient was admitted to [facility] and started on Zyprexa.    11/11/24  Seen today, reports  medication compliance and stable mood. Denies psychosis. Denies suicidal ideations but continues to report Insomnia. He blames the noise on the unit  for not sleeping well. Patient admits he was smoking heavily before this admit, but has been able to abstain from smoking since this admit. He expresses optimism and hopefulness.   He plans to return to his [facility] in [place], [place] post discharge      11/12/24  Today, patient reports that he is feeling much more clear.  Remains medication compliant and stable mood. Denies psychosis. Denies suicidal ideations. Tolerating medications well without issue. Staff report that he has been attending group and cooperative with staff and peers. He plans to return to his [facility] in [place], [place] post discharge. Will continue with current POC.       11/13/24  C/M, reports sleeping better last night after remembering the nicotine patch that caused night terrors before. Woke up a few times to use the BR. Appetite good, consumes most of meals served with LBM noted 11/12. Compliant with meds PO-tolerating. Feels like zyprexa is helping with aggressive thoughts. Comping out of room more for activities, missed some occasional groups unless he needed to rest. Interacts with select peers on the unit. Gait independent and steady, able to make needs known. Less racing thoughts. Can make needs known when he needs to. Gait independent and steady. Denies pain/discomfort.       11/14/24  The patient reports feeling good today and expresses readiness for discharge. He remains medication compliant with a stable mood, denying any psychosis or suicidal ideation. The patient is tolerating his medications without issues. Staff note that he has been attending groups and has been cooperative with both staff and peers. The patient plans to return to his [facility] in [place], [place], following discharge. We will continue with the current plan of care and proceed with discharge home today.         Current Medications:   Scheduled Meds:        DISCHARGE EXAMINATION    VITALS   Vitals:    11/12/24 0701 11/12/24 1101 11/13/24 0730 11/14/24 0701   BP: (!)  140/100 (!) 136/100  Comment: patient was given clonidine as per /86 127/84   BP Location:    Right arm   Patient Position:    Sitting   Pulse: 92 90 70 70   Resp: 19 20 18 20   Temp: 97.9 °F (36.6 °C) 97.6 °F (36.4 °C) 97.3 °F (36.3 °C) 98 °F (36.7 °C)   TempSrc:    Oral   SpO2: 100% 96% 99% 98%   Weight:       Height:             Discharge Mental Status Exam:  General Appearance: appears stated age, well developed and nourished, adequately groomed and appropriately dressed, in no acute distress  Arousal: alert with clear sensorium  Behavior: normal; cooperative; reasonably friendly, pleasant, and polite; appropriate eye-contact; under good behavioral control  Movements and Motor Activity: no abnormal involuntary movements noted; no tics, no tremors, no akathisia, no dystonia, no evidence of tardive dyskinesia; no psychomotor agitation or retardation  Orientation: intact; oriented fully to person, place, time and situation  Speech: intact; normal rate, rhythm, volume, tone and pitch; conversational, spontaneous, and coherent  Mood: Depressed and Anxious  Affect: mood-congruent  Thought Process: goal-directed  Associations: intact, no loosening of associations  Thought Content and Perceptions: no suicidal or homicidal ideation, no auditory or visual hallucinations, no paranoid ideation, no ideas of reference, no evidence of delusions or psychosis  Recent and Remote Memory: grossly intact, able to recall relevant and salient information from the recent and remote past; per interview/observation with patient  Attention and Concentration: attentive to conversation; per interview/observation with patient  Fund of Knowledge: aware of current events; based on history, vocabulary, fund of knowledge, syntax, grammar, and content  Insight: intact; based on understanding of severity of illness and HPI  Judgment: adequate; based on patient's behavior and HPI      Discharge  Condition:  Stable    Prognosis:  Fair    Justification for multiple antipsychotics:  N/a    Disposition:  discharged to home    Follow-up:   Follow-up Information       [facility]. Go on 11/25/2024.    Why: with Dr. Kramer @12:15pm  Contact information:  [facility] [address] [place]  [place] [address]    [phone] (fax)             Edwina Jung NP Follow up.    Specialty: Family Medicine  Contact information:  [address] [place] [id number] [phone]                             Medication Regimen:  No current facility-administered medications for this encounter.    Current Outpatient Medications:     nicotine (NICODERM CQ) 21 mg/24 hr, Place 1 patch onto the skin once daily., Disp: 30 patch, Rfl: 0    OLANZapine zydis (ZYPREXA) 10 MG TbDL, Take 1 tablet (10 mg total) by mouth every evening., Disp: 30 tablet, Rfl: 0      Patient Instructions:   Continue medication regimen as prescribed.    Disposition plan per  - see  notes for details.    Patient instructed to call 911 or present to emergency department if any of the following complications develop status post discharge: suicidality, homicidality, or grave disability.     Total time spent discharging patient: <30 minutes      Wil Virgen M.D.

## 2025-04-02 ENCOUNTER — HOSPITAL ENCOUNTER (EMERGENCY)
Facility: HOSPITAL | Age: 35
Discharge: ANOTHER HOSPITAL | End: 2025-04-02
Attending: STUDENT IN AN ORGANIZED HEALTH CARE EDUCATION/TRAINING PROGRAM
Payer: MEDICARE

## 2025-04-02 ENCOUNTER — HOSPITAL ENCOUNTER (INPATIENT)
Facility: HOSPITAL | Age: 35
LOS: 7 days | Discharge: HOME | DRG: 885 | End: 2025-04-09
Attending: PSYCHIATRY & NEUROLOGY | Admitting: PSYCHIATRY & NEUROLOGY
Payer: MEDICARE

## 2025-04-02 VITALS
BODY MASS INDEX: 24.38 KG/M2 | DIASTOLIC BLOOD PRESSURE: 90 MMHG | SYSTOLIC BLOOD PRESSURE: 145 MMHG | WEIGHT: 190 LBS | TEMPERATURE: 99 F | RESPIRATION RATE: 20 BRPM | HEIGHT: 74 IN | HEART RATE: 95 BPM | OXYGEN SATURATION: 98 %

## 2025-04-02 DIAGNOSIS — F23 ACUTE PSYCHOSIS: ICD-10-CM

## 2025-04-02 DIAGNOSIS — F29 PSYCHOSIS: ICD-10-CM

## 2025-04-02 DIAGNOSIS — F31.2 BIPOLAR AFFECTIVE DISORDER, CURRENT EPISODE MANIC WITH PSYCHOTIC SYMPTOMS: Primary | ICD-10-CM

## 2025-04-02 DIAGNOSIS — Z00.8 MEDICAL CLEARANCE FOR PSYCHIATRIC ADMISSION: ICD-10-CM

## 2025-04-02 LAB
ACCEPTIBLE SP GR UR QL: 1.02 (ref 1–1.03)
ALBUMIN SERPL-MCNC: 4.2 G/DL (ref 3.5–5)
ALBUMIN/GLOB SERPL: 1.2 RATIO (ref 1.1–2)
ALP SERPL-CCNC: 59 UNIT/L (ref 40–150)
ALT SERPL-CCNC: 15 UNIT/L (ref 0–55)
AMPHET UR QL SCN: NEGATIVE
ANION GAP SERPL CALC-SCNC: 9 MEQ/L
APAP SERPL-MCNC: <3 UG/ML (ref 10–30)
AST SERPL-CCNC: 22 UNIT/L (ref 11–45)
BARBITURATE SCN PRESENT UR: NEGATIVE
BASOPHILS # BLD AUTO: 0.02 X10(3)/MCL
BASOPHILS NFR BLD AUTO: 0.2 %
BENZODIAZ UR QL SCN: NEGATIVE
BILIRUB SERPL-MCNC: 0.5 MG/DL
BILIRUB UR QL STRIP.AUTO: NEGATIVE
BUN SERPL-MCNC: 20.6 MG/DL (ref 8.9–20.6)
CALCIUM SERPL-MCNC: 8.9 MG/DL (ref 8.4–10.2)
CANNABINOIDS UR QL SCN: POSITIVE
CHLORIDE SERPL-SCNC: 108 MMOL/L (ref 98–107)
CLARITY UR: CLEAR
CO2 SERPL-SCNC: 21 MMOL/L (ref 22–29)
COCAINE UR QL SCN: NEGATIVE
COLOR UR AUTO: YELLOW
CREAT SERPL-MCNC: 1.02 MG/DL (ref 0.72–1.25)
CREAT/UREA NIT SERPL: 20
EOSINOPHIL # BLD AUTO: 0.01 X10(3)/MCL (ref 0–0.9)
EOSINOPHIL NFR BLD AUTO: 0.1 %
ERYTHROCYTE [DISTWIDTH] IN BLOOD BY AUTOMATED COUNT: 12.5 % (ref 11.5–17)
ETHANOL SERPL-MCNC: <10 MG/DL
FENTANYL UR QL SCN: NEGATIVE
GFR SERPLBLD CREATININE-BSD FMLA CKD-EPI: >60 ML/MIN/1.73/M2
GLOBULIN SER-MCNC: 3.6 GM/DL (ref 2.4–3.5)
GLUCOSE SERPL-MCNC: 211 MG/DL (ref 74–100)
GLUCOSE UR QL STRIP: 100
HCT VFR BLD AUTO: 40.2 % (ref 42–52)
HGB BLD-MCNC: 13.9 G/DL (ref 14–18)
HGB UR QL STRIP: NEGATIVE
IMM GRANULOCYTES # BLD AUTO: 0.02 X10(3)/MCL (ref 0–0.04)
IMM GRANULOCYTES NFR BLD AUTO: 0.2 %
KETONES UR QL STRIP: ABNORMAL
LEUKOCYTE ESTERASE UR QL STRIP: NEGATIVE
LYMPHOCYTES # BLD AUTO: 1.05 X10(3)/MCL (ref 0.6–4.6)
LYMPHOCYTES NFR BLD AUTO: 9.1 %
MCH RBC QN AUTO: 32 PG (ref 27–31)
MCHC RBC AUTO-ENTMCNC: 34.6 G/DL (ref 33–36)
MCV RBC AUTO: 92.4 FL (ref 80–94)
MONOCYTES # BLD AUTO: 0.7 X10(3)/MCL (ref 0.1–1.3)
MONOCYTES NFR BLD AUTO: 6.1 %
NEUTROPHILS # BLD AUTO: 9.69 X10(3)/MCL (ref 2.1–9.2)
NEUTROPHILS NFR BLD AUTO: 84.3 %
NITRITE UR QL STRIP: NEGATIVE
OPIATES UR QL SCN: NEGATIVE
PCP UR QL: NEGATIVE
PH UR STRIP: 6 [PH]
PH UR: 6 [PH] (ref 3–11)
PLATELET # BLD AUTO: 251 X10(3)/MCL (ref 130–400)
PMV BLD AUTO: 9.4 FL (ref 7.4–10.4)
POTASSIUM SERPL-SCNC: 3.7 MMOL/L (ref 3.5–5.1)
PROT SERPL-MCNC: 7.8 GM/DL (ref 6.4–8.3)
PROT UR QL STRIP: NEGATIVE
RBC # BLD AUTO: 4.35 X10(6)/MCL (ref 4.7–6.1)
SALICYLATES SERPL-MCNC: <5 MG/DL (ref 15–30)
SODIUM SERPL-SCNC: 138 MMOL/L (ref 136–145)
SP GR UR STRIP.AUTO: 1.02 (ref 1–1.03)
UROBILINOGEN UR STRIP-ACNC: 0.2
WBC # BLD AUTO: 11.49 X10(3)/MCL (ref 4.5–11.5)

## 2025-04-02 PROCEDURE — 80179 DRUG ASSAY SALICYLATE: CPT | Performed by: STUDENT IN AN ORGANIZED HEALTH CARE EDUCATION/TRAINING PROGRAM

## 2025-04-02 PROCEDURE — 80307 DRUG TEST PRSMV CHEM ANLYZR: CPT | Performed by: STUDENT IN AN ORGANIZED HEALTH CARE EDUCATION/TRAINING PROGRAM

## 2025-04-02 PROCEDURE — 11400000 HC PSYCH PRIVATE ROOM

## 2025-04-02 PROCEDURE — 96372 THER/PROPH/DIAG INJ SC/IM: CPT | Performed by: STUDENT IN AN ORGANIZED HEALTH CARE EDUCATION/TRAINING PROGRAM

## 2025-04-02 PROCEDURE — 25000003 PHARM REV CODE 250: Performed by: STUDENT IN AN ORGANIZED HEALTH CARE EDUCATION/TRAINING PROGRAM

## 2025-04-02 PROCEDURE — 25000003 PHARM REV CODE 250: Performed by: PSYCHIATRY & NEUROLOGY

## 2025-04-02 PROCEDURE — 80143 DRUG ASSAY ACETAMINOPHEN: CPT | Performed by: STUDENT IN AN ORGANIZED HEALTH CARE EDUCATION/TRAINING PROGRAM

## 2025-04-02 PROCEDURE — 80053 COMPREHEN METABOLIC PANEL: CPT | Performed by: STUDENT IN AN ORGANIZED HEALTH CARE EDUCATION/TRAINING PROGRAM

## 2025-04-02 PROCEDURE — 81003 URINALYSIS AUTO W/O SCOPE: CPT | Performed by: STUDENT IN AN ORGANIZED HEALTH CARE EDUCATION/TRAINING PROGRAM

## 2025-04-02 PROCEDURE — 99285 EMERGENCY DEPT VISIT HI MDM: CPT | Mod: 25

## 2025-04-02 PROCEDURE — S4991 NICOTINE PATCH NONLEGEND: HCPCS | Performed by: STUDENT IN AN ORGANIZED HEALTH CARE EDUCATION/TRAINING PROGRAM

## 2025-04-02 PROCEDURE — 63600175 PHARM REV CODE 636 W HCPCS: Performed by: STUDENT IN AN ORGANIZED HEALTH CARE EDUCATION/TRAINING PROGRAM

## 2025-04-02 PROCEDURE — 82077 ASSAY SPEC XCP UR&BREATH IA: CPT | Performed by: STUDENT IN AN ORGANIZED HEALTH CARE EDUCATION/TRAINING PROGRAM

## 2025-04-02 PROCEDURE — 85025 COMPLETE CBC W/AUTO DIFF WBC: CPT | Performed by: STUDENT IN AN ORGANIZED HEALTH CARE EDUCATION/TRAINING PROGRAM

## 2025-04-02 RX ORDER — DIPHENHYDRAMINE HCL 25 MG
50 CAPSULE ORAL EVERY 6 HOURS PRN
Status: CANCELLED | OUTPATIENT
Start: 2025-04-02

## 2025-04-02 RX ORDER — ACETAMINOPHEN 325 MG/1
650 TABLET ORAL EVERY 6 HOURS PRN
Status: DISCONTINUED | OUTPATIENT
Start: 2025-04-02 | End: 2025-04-09 | Stop reason: HOSPADM

## 2025-04-02 RX ORDER — TRAZODONE HYDROCHLORIDE 100 MG/1
100 TABLET ORAL NIGHTLY PRN
Status: CANCELLED | OUTPATIENT
Start: 2025-04-02

## 2025-04-02 RX ORDER — DIPHENHYDRAMINE HYDROCHLORIDE 50 MG/ML
50 INJECTION, SOLUTION INTRAMUSCULAR; INTRAVENOUS EVERY 6 HOURS PRN
Status: DISCONTINUED | OUTPATIENT
Start: 2025-04-02 | End: 2025-04-09 | Stop reason: HOSPADM

## 2025-04-02 RX ORDER — HALOPERIDOL 5 MG/1
5 TABLET ORAL EVERY 6 HOURS PRN
Status: CANCELLED | OUTPATIENT
Start: 2025-04-02

## 2025-04-02 RX ORDER — IBUPROFEN 200 MG
1 TABLET ORAL DAILY
Status: DISCONTINUED | OUTPATIENT
Start: 2025-04-03 | End: 2025-04-09 | Stop reason: HOSPADM

## 2025-04-02 RX ORDER — LORAZEPAM 1 MG/1
2 TABLET ORAL EVERY 6 HOURS PRN
Status: CANCELLED | OUTPATIENT
Start: 2025-04-02

## 2025-04-02 RX ORDER — HALOPERIDOL LACTATE 5 MG/ML
5 INJECTION, SOLUTION INTRAMUSCULAR EVERY 6 HOURS PRN
Status: DISCONTINUED | OUTPATIENT
Start: 2025-04-02 | End: 2025-04-09 | Stop reason: HOSPADM

## 2025-04-02 RX ORDER — HYDROXYZINE HYDROCHLORIDE 50 MG/1
50 TABLET, FILM COATED ORAL EVERY 4 HOURS PRN
Status: CANCELLED | OUTPATIENT
Start: 2025-04-02

## 2025-04-02 RX ORDER — ZIPRASIDONE MESYLATE 20 MG/ML
20 INJECTION, POWDER, LYOPHILIZED, FOR SOLUTION INTRAMUSCULAR
Status: COMPLETED | OUTPATIENT
Start: 2025-04-02 | End: 2025-04-02

## 2025-04-02 RX ORDER — ALUMINUM HYDROXIDE, MAGNESIUM HYDROXIDE, AND SIMETHICONE 1200; 120; 1200 MG/30ML; MG/30ML; MG/30ML
30 SUSPENSION ORAL EVERY 6 HOURS PRN
Status: DISCONTINUED | OUTPATIENT
Start: 2025-04-02 | End: 2025-04-09 | Stop reason: HOSPADM

## 2025-04-02 RX ORDER — HYDROXYZINE HYDROCHLORIDE 50 MG/1
50 TABLET, FILM COATED ORAL EVERY 4 HOURS PRN
Status: DISCONTINUED | OUTPATIENT
Start: 2025-04-02 | End: 2025-04-09 | Stop reason: HOSPADM

## 2025-04-02 RX ORDER — IBUPROFEN 200 MG
1 TABLET ORAL
Status: DISCONTINUED | OUTPATIENT
Start: 2025-04-02 | End: 2025-04-02 | Stop reason: HOSPADM

## 2025-04-02 RX ORDER — DIPHENHYDRAMINE HCL 25 MG
50 CAPSULE ORAL EVERY 6 HOURS PRN
Status: DISCONTINUED | OUTPATIENT
Start: 2025-04-02 | End: 2025-04-09 | Stop reason: HOSPADM

## 2025-04-02 RX ORDER — LORAZEPAM 2 MG/ML
2 INJECTION INTRAMUSCULAR
Status: COMPLETED | OUTPATIENT
Start: 2025-04-02 | End: 2025-04-02

## 2025-04-02 RX ORDER — LORAZEPAM 2 MG/ML
2 INJECTION INTRAMUSCULAR EVERY 6 HOURS PRN
Status: CANCELLED | OUTPATIENT
Start: 2025-04-02

## 2025-04-02 RX ORDER — ALUMINUM HYDROXIDE, MAGNESIUM HYDROXIDE, AND SIMETHICONE 1200; 120; 1200 MG/30ML; MG/30ML; MG/30ML
30 SUSPENSION ORAL EVERY 6 HOURS PRN
Status: CANCELLED | OUTPATIENT
Start: 2025-04-02

## 2025-04-02 RX ORDER — HALOPERIDOL LACTATE 5 MG/ML
5 INJECTION, SOLUTION INTRAMUSCULAR EVERY 6 HOURS PRN
Status: CANCELLED | OUTPATIENT
Start: 2025-04-02

## 2025-04-02 RX ORDER — ACETAMINOPHEN 325 MG/1
650 TABLET ORAL EVERY 6 HOURS PRN
Status: CANCELLED | OUTPATIENT
Start: 2025-04-02

## 2025-04-02 RX ORDER — HALOPERIDOL 5 MG/1
5 TABLET ORAL EVERY 6 HOURS PRN
Status: DISCONTINUED | OUTPATIENT
Start: 2025-04-02 | End: 2025-04-09 | Stop reason: HOSPADM

## 2025-04-02 RX ORDER — TRAZODONE HYDROCHLORIDE 100 MG/1
100 TABLET ORAL NIGHTLY PRN
Status: DISCONTINUED | OUTPATIENT
Start: 2025-04-02 | End: 2025-04-09 | Stop reason: HOSPADM

## 2025-04-02 RX ORDER — IBUPROFEN 200 MG
1 TABLET ORAL DAILY
Status: CANCELLED | OUTPATIENT
Start: 2025-04-03

## 2025-04-02 RX ORDER — DIPHENHYDRAMINE HYDROCHLORIDE 50 MG/ML
50 INJECTION, SOLUTION INTRAMUSCULAR; INTRAVENOUS EVERY 6 HOURS PRN
Status: CANCELLED | OUTPATIENT
Start: 2025-04-02

## 2025-04-02 RX ORDER — LORAZEPAM 1 MG/1
2 TABLET ORAL EVERY 6 HOURS PRN
Status: DISCONTINUED | OUTPATIENT
Start: 2025-04-02 | End: 2025-04-09 | Stop reason: HOSPADM

## 2025-04-02 RX ORDER — LORAZEPAM 2 MG/ML
2 INJECTION INTRAMUSCULAR EVERY 6 HOURS PRN
Status: DISCONTINUED | OUTPATIENT
Start: 2025-04-02 | End: 2025-04-09 | Stop reason: HOSPADM

## 2025-04-02 RX ADMIN — LORAZEPAM 2 MG: 1 TABLET ORAL at 11:04

## 2025-04-02 RX ADMIN — HALOPERIDOL 5 MG: 5 TABLET ORAL at 11:04

## 2025-04-02 RX ADMIN — TRAZODONE HYDROCHLORIDE 100 MG: 100 TABLET ORAL at 10:04

## 2025-04-02 RX ADMIN — LORAZEPAM 2 MG: 2 INJECTION INTRAMUSCULAR; INTRAVENOUS at 02:04

## 2025-04-02 RX ADMIN — DIPHENHYDRAMINE HYDROCHLORIDE 50 MG: 25 CAPSULE ORAL at 11:04

## 2025-04-02 RX ADMIN — ZIPRASIDONE MESYLATE 20 MG: 20 INJECTION, POWDER, LYOPHILIZED, FOR SOLUTION INTRAMUSCULAR at 03:04

## 2025-04-02 RX ADMIN — HYDROXYZINE HYDROCHLORIDE 50 MG: 50 TABLET ORAL at 09:04

## 2025-04-02 RX ADMIN — NICOTINE 1 PATCH: 21 PATCH, EXTENDED RELEASE TRANSDERMAL at 03:04

## 2025-04-02 NOTE — ED PROVIDER NOTES
Encounter Date: 4/2/2025      History     Chief Complaint   Patient presents with    Psychiatric Evaluation     Pt very anxious staying with mom hx of bipolar pt rambling about nicotine . Mom reports he's been staying with her last couple days did admit to vaping marijuana. Pt denies SI or HI      HPI: Please see MDM    Past Medical History:  He  has no past medical history on file.    Past Surgical History:  He  has no past surgical history on file.    Allergies:  Review of patient's allergies indicates:  No Known Allergies    Family History:  His family history is not on file.    Social History:  He  reports that he has been smoking cigarettes. He has never used smokeless tobacco. He reports that he does not currently use alcohol. He reports that he does not currently use drugs after having used the following drugs: Opium.    Home Medications:  He has a current medication list which includes the following prescription(s): olanzapine zydis, and the following Facility-Administered Medications: nicotine.     ROS  Pertinent positives and negatives listed in HPI. All other systems are reviewed and are negative.    Physical Exam     Initial Vitals [04/02/25 1420]   BP Pulse Resp Temp SpO2   (!) 155/81 (!) 111 20 99.8 °F (37.7 °C) 98 %      MAP       --         General:  Patient is alert and oriented, he seems uneasy and nervous with high anxiety as he paces around the room  Psychiatric: Mood: Manic. Affect: Labile.  Thought Process: flight of ideas.  He denies SI however we will not answer whether or not he has endorsing HI  HEENT: Normocephalic, atraumatic. Face symmetric.  Cardiovascular:  Tachycardic with a regular rhythm  Pulmonary: Bilateral symmetric chest rise, Non-labored  Abdominal:  nondistended  Extremities: No clubbing, cyanosis or edema.  Neuro:   Patient moves all extremities equally. Sensation intact bilateraly.    ED Course   Procedures  Labs Reviewed   COMPREHENSIVE METABOLIC PANEL - Abnormal        Result Value    Sodium 138      Potassium 3.7      Chloride 108 (*)     CO2 21 (*)     Glucose 211 (*)     Blood Urea Nitrogen 20.6      Creatinine 1.02      Calcium 8.9      Protein Total 7.8      Albumin 4.2      Globulin 3.6 (*)     Albumin/Globulin Ratio 1.2      Bilirubin Total 0.5      ALP 59      ALT 15      AST 22      eGFR >60      Anion Gap 9.0      BUN/Creatinine Ratio 20     DRUG SCREEN, URINE (BEAKER) - Abnormal    Amphetamines, Urine Negative      Barbiturates, Urine Negative      Benzodiazepine, Urine Negative      Cannabinoids, Urine Positive (*)     Cocaine, Urine Negative      Opiates, Urine Negative      Phencyclidine, Urine Negative      Fentanyl, Urine Negative      pH, Urine 6.0      Specific Gravity, Urine Auto 1.020      Narrative:     Cut off concentrations:    Amphetamines - 1000 ng/ml  Barbiturates - 200 ng/ml  Benzodiazepine - 200 ng/ml  Cannabinoids (THC) - 50 ng/ml  Cocaine - 300 ng/ml  Fentanyl - 1.0 ng/ml  MDMA - 500 ng/ml  Opiates - 300 ng/ml   Phencyclidine (PCP) - 25 ng/ml    Specimen submitted for drug analysis and tested for pH and specific gravity in order to evaluate sample integrity. Suspect tampering if specific gravity is <1.003 and/or pH is not within the range of 4.5 - 8.0  False negatives may result form substances such as bleach added to urine.  False positives may result for the presence of a substance with similar chemical structure to the drug or its metabolite.    This test provides only a PRELIMINARY analytical test result. A more specific alternate chemical method must be used in order to obtain a confirmed analytical result. Gas chromatography/mass spectrometry (GC/MS) is the preferred confirmatory method. Other chemical confirmation methods are available. Clinical consideration and professional judgement should be applied to any drug of abuse test result, particularly when preliminary positive results are used.    Positive results will be confirmed only at the  physicians request. Unconfirmed screening results are to be used only for medical purposes (treatment).        URINALYSIS, REFLEX TO URINE CULTURE - Abnormal    Color, UA Yellow      Appearance, UA Clear      Specific Gravity, UA 1.020      pH, UA 6.0      Protein, UA Negative      Glucose,  (*)     Ketones, UA Trace (*)     Blood, UA Negative      Bilirubin, UA Negative      Urobilinogen, UA 0.2      Nitrites, UA Negative      Leukocyte Esterase, UA Negative     ACETAMINOPHEN LEVEL - Abnormal    Acetaminophen Level <3.0 (*)    SALICYLATE LEVEL - Abnormal    Salicylate Level <5.0 (*)    CBC WITH DIFFERENTIAL - Abnormal    WBC 11.49      RBC 4.35 (*)     Hgb 13.9 (*)     Hct 40.2 (*)     MCV 92.4      MCH 32.0 (*)     MCHC 34.6      RDW 12.5      Platelet 251      MPV 9.4      Neut % 84.3      Lymph % 9.1      Mono % 6.1      Eos % 0.1      Basophil % 0.2      Imm Grans % 0.2      Neut # 9.69 (*)     Lymph # 1.05      Mono # 0.70      Eos # 0.01      Baso # 0.02      Imm Gran # 0.02     CBC W/ AUTO DIFFERENTIAL    Narrative:     The following orders were created for panel order CBC Auto Differential.  Procedure                               Abnormality         Status                     ---------                               -----------         ------                     CBC with Differential[[id number]]       Abnormal            Final result                 Please view results for these tests on the individual orders.          Imaging Results    None          Medications   nicotine 21 mg/24 hr 1 patch (1 patch Transdermal Patch Applied 4/2/25 1505)   LORazepam injection 2 mg (2 mg Intramuscular Given 4/2/25 1450)   ziprasidone injection 20 mg (20 mg Intramuscular Given 4/2/25 1540)     Medical Decision Making    Oscar Cnaseco is a 34 y.o. male with pertinent PMH of bipolar disorder with manic episodes who presented to [facility] ED on 4/2/2025   with his mother actively having manic episode.  Patient endorses  that he does not take his medication as he should, then begins to ramble about nicotine abuse.  He paces throughout the room constantly clenching his fist.  He denies SI however when questioning about HI he will not answer the question.  I asked the mother as she ever heard him talk about hurting himself she said no, then I asked is her antibodies he would maybe want to hurt and she stated his father.  Patient did not endorse this however will not answer my question.  He denies hearing voices however certain mannerisms are indicative of possible auditory hallucinations.    Physical exam as above.    Patient's gravely disabled currently manic.  Not taking his medications as he should be, continues to state he has a violent mind and will not deny homicidal ideation.  Patient was PEC'd for transfer to psych facility and further treatment.  Patient was found to have slight elevation in blood glucose of 211, trace glucose also seen in the urine, can not completely discount the fact that patient has been on olanzapine and its relationship to metabolic syndrome.  Overall other all other labs are unremarkable.  Patient did receive Ativan as well as Geodon while in the emergency department.    Amount and/or Complexity of Data Reviewed  External Data Reviewed:  Notes, labs  Labs:  All labs that have been ordered have been reviewed and are documented in ED Course.  Radiology: All images that have been ordered have been reviewed and are documented in ED Course.         Ddx:  Schizophrenia exacerbation, bipolar psychosis, drug induced psychosis, thyrotoxicosis, renal failure, liver failure, toxydrome, among others               Medically cleared for psychiatry placement: 4/2/2025  3:44 PM                Clinical Impression:  Final diagnoses:  [Z00.8] Medical clearance for psychiatric admission  [F23] Acute psychosis  [F31.2] Bipolar affective disorder, current episode manic with psychotic symptoms (Primary)            ED  Disposition Condition    Transfer to Psych Facility Stable          ED Prescriptions    None       Follow-up Information    None          Fabian Feliz MD  04/02/25 [id number]

## 2025-04-02 NOTE — ED NOTES
Per [facility], pt accepted to [facility] by MD Stephanie; # for report: [phone].

## 2025-04-02 NOTE — ED NOTES
Pt's family member updated that he will be transferred to [facility] per pt's request.

## 2025-04-03 PROCEDURE — S4991 NICOTINE PATCH NONLEGEND: HCPCS | Performed by: PSYCHIATRY & NEUROLOGY

## 2025-04-03 PROCEDURE — 25000003 PHARM REV CODE 250

## 2025-04-03 PROCEDURE — 25000003 PHARM REV CODE 250: Performed by: PSYCHIATRY & NEUROLOGY

## 2025-04-03 PROCEDURE — 11400000 HC PSYCH PRIVATE ROOM

## 2025-04-03 RX ORDER — OLANZAPINE 5 MG/1
10 TABLET, FILM COATED ORAL NIGHTLY
Status: DISCONTINUED | OUTPATIENT
Start: 2025-04-03 | End: 2025-04-09 | Stop reason: HOSPADM

## 2025-04-03 RX ADMIN — NICOTINE 1 PATCH: 21 PATCH, EXTENDED RELEASE TRANSDERMAL at 03:04

## 2025-04-03 RX ADMIN — OLANZAPINE 10 MG: 5 TABLET, FILM COATED ORAL at 08:04

## 2025-04-03 NOTE — PROGRESS NOTES
04/03/25 1500   [facility] Group Therapy   Group Name Therapeutic Recreation   Specific Interventions Skilled Activity Leisure Education and Awareness   Participation Level Active;Supportive;Appropriate;Sharing   Participation Quality Cooperative;Withdrawn   Insight/Motivation Limited   Affect/Mood Display Restless   Cognition Alert   Psychomotor WNL

## 2025-04-03 NOTE — NURSING
"Admission Note:    Oscar Canseco is a 34 y.o. male, : 1990, MRN: [id number], admitted on 2025 to [facility] ([facility]) for Wil Virgen MD with a diagnosis of Psychosis [F29]. Patient admitted on a status of Physician Emergency Certificate (PEC). Oscar reports no known food or drug allergies.    Patient demonstrated an affect that was anxious, irritable, and agitated. Patient demonstrated mood during assessment that was anxious. Patient had an appearance that was disheveled.  Patient denies suicidal ideation. Patient denies suicide plan. Patient denies hallucinations but appears to be responding to internal stimuli. Talking to self and responding to self while this nurse was asking admissions questions    Oscar's  height is 5' 11.5" (1.816 m) and weight is 80.5 kg (177 lb 6.4 oz). His oral temperature is 98 °F (36.7 °C). His blood pressure is 152/94 (abnormal) and his pulse is 92. His respiration is 20 and oxygen saturation is 99%.     Oscar's last BM was noted on: 25    Metal detector screening performed via security personnel. The result of the scan was no contraband found. Head-to-toe physical assessment completed with the following findings:  Scratches and bruising found upon body screen. A full skin assessment was performed. Oscar's skin appeared dry and intact except for documented scratches and bruises.  Oscar was oriented to unit, staff, peers, and room. Patient belongings/valuables stored in locked intake room cabinet and changes of clothing provided to patient. Oscar was placed on Q 15 min observations.     "

## 2025-04-03 NOTE — H&P
[facility] - Behavioral Health Unit  History & Physical    Subjective:      Chief Complaint/Reason for Admission: bipolar with yousif    Oscar Canseco is a 34 y.o. male. Bipolar with yousif    Past Medical History:   Diagnosis Date    Bipolar disorder     History of psychiatric hospitalization     Hx of psychiatric care     Therapy      No past surgical history on file.  No family history on file.  Social History[1]    PTA Medications   Medication Sig    OLANZapine zydis (ZYPREXA) 10 MG TbDL Take 1 tablet (10 mg total) by mouth every evening.     Review of patient's allergies indicates:  No Known Allergies     Review of Systems   Constitutional: Negative.    HENT: Negative.     Eyes: Negative.    Respiratory: Negative.     Cardiovascular: Negative.    Gastrointestinal: Negative.    Genitourinary: Negative.    Musculoskeletal: Negative.    Skin: Negative.    Neurological: Negative.    Endo/Heme/Allergies: Negative.    Psychiatric/Behavioral:  Negative for depression, hallucinations, substance abuse and suicidal ideas. The patient is nervous/anxious.        Objective:      Vital Signs (Most Recent)  Temp: 98.5 °F (36.9 °C) (04/03/25 0701)  Pulse: 73 (04/03/25 0701)  Resp: 16 (04/03/25 0701)  BP: 116/79 (04/03/25 0701)  SpO2: 98 % (04/03/25 0701)    Vital Signs Range (Last 24H):  Temp:  [98 °F (36.7 °C)-99.8 °F (37.7 °C)]   Pulse:  []   Resp:  [16-20]   BP: (116-155)/(79-94)   SpO2:  [98 %-99 %]     Physical Exam  HENT:      Head: Normocephalic.      Right Ear: Tympanic membrane normal.      Left Ear: Tympanic membrane normal.      Nose: Nose normal.      Mouth/Throat:      Mouth: Mucous membranes are moist.   Eyes:      Extraocular Movements: Extraocular movements intact.      Pupils: Pupils are equal, round, and reactive to light.   Cardiovascular:      Rate and Rhythm: Normal rate and regular rhythm.   Pulmonary:      Effort: Pulmonary effort is normal.   Abdominal:      General: Abdomen is  flat.   Musculoskeletal:         General: Normal range of motion.   Skin:     General: Skin is warm.   Neurological:      General: No focal deficit present.      Mental Status: He is alert and oriented to person, place, and time.      Comments: Vision normal   Hearing normal   EOM intact   Face muscles normal  Facial sensation normal   Shrugs shoulders  Tongue midline            Data Review:    Recent Results (from the past 48 hours)   Comprehensive Metabolic Panel    Collection Time: 04/02/25  2:44 PM   Result Value Ref Range    Sodium 138 136 - 145 mmol/L    Potassium 3.7 3.5 - 5.1 mmol/L    Chloride 108 (H) 98 - 107 mmol/L    CO2 21 (L) 22 - 29 mmol/L    Glucose 211 (H) 74 - 100 mg/dL    Blood Urea Nitrogen 20.6 8.9 - 20.6 mg/dL    Creatinine 1.02 0.72 - 1.25 mg/dL    Calcium 8.9 8.4 - 10.2 mg/dL    Protein Total 7.8 6.4 - 8.3 gm/dL    Albumin 4.2 3.5 - 5.0 g/dL    Globulin 3.6 (H) 2.4 - 3.5 gm/dL    Albumin/Globulin Ratio 1.2 1.1 - 2.0 ratio    Bilirubin Total 0.5 <=1.5 mg/dL    ALP 59 40 - 150 unit/L    ALT 15 0 - 55 unit/L    AST 22 11 - 45 unit/L    eGFR >60 mL/min/1.73/m2    Anion Gap 9.0 mEq/L    BUN/Creatinine Ratio 20    Acetaminophen Level    Collection Time: 04/02/25  2:44 PM   Result Value Ref Range    Acetaminophen Level <3.0 (L) 10.0 - 30.0 ug/ml   Salicylate Level    Collection Time: 04/02/25  2:44 PM   Result Value Ref Range    Salicylate Level <5.0 (L) 15.0 - 30.0 mg/dL   CBC with Differential    Collection Time: 04/02/25  2:44 PM   Result Value Ref Range    WBC 11.49 4.50 - 11.50 x10(3)/mcL    RBC 4.35 (L) 4.70 - 6.10 x10(6)/mcL    Hgb 13.9 (L) 14.0 - 18.0 g/dL    Hct 40.2 (L) 42.0 - 52.0 %    MCV 92.4 80.0 - 94.0 fL    MCH 32.0 (H) 27.0 - 31.0 pg    MCHC 34.6 33.0 - 36.0 g/dL    RDW 12.5 11.5 - 17.0 %    Platelet 251 130 - 400 x10(3)/mcL    MPV 9.4 7.4 - 10.4 fL    Neut % 84.3 %    Lymph % 9.1 %    Mono % 6.1 %    Eos % 0.1 %    Basophil % 0.2 %    Imm Grans % 0.2 %    Neut # 9.69 (H) 2.1 - 9.2  x10(3)/mcL    Lymph # 1.05 0.6 - 4.6 x10(3)/mcL    Mono # 0.70 0.1 - 1.3 x10(3)/mcL    Eos # 0.01 0 - 0.9 x10(3)/mcL    Baso # 0.02 <=0.2 x10(3)/mcL    Imm Gran # 0.02 0.00 - 0.04 x10(3)/mcL   Ethanol    Collection Time: 04/02/25  2:44 PM   Result Value Ref Range    Ethanol Level <10.0 <=10.0 mg/dL   Drug Screen, Urine    Collection Time: 04/02/25  3:10 PM   Result Value Ref Range    Amphetamines, Urine Negative Negative    Barbiturates, Urine Negative Negative    Benzodiazepine, Urine Negative Negative    Cannabinoids, Urine Positive (A) Negative    Cocaine, Urine Negative Negative    Opiates, Urine Negative Negative    Phencyclidine, Urine Negative Negative    Fentanyl, Urine Negative Negative    pH, Urine 6.0 3.0 - 11.0    Specific Gravity, Urine Auto 1.020 1.001 - 1.035   Urinalysis, Reflex to Urine Culture    Collection Time: 04/02/25  3:10 PM    Specimen: Urine   Result Value Ref Range    Color, UA Yellow Yellow, Light-Yellow, Dark Yellow, Lesly, Straw    Appearance, UA Clear Clear    Specific Gravity, UA 1.020 1.005 - 1.030    pH, UA 6.0 5.0 - 8.5    Protein, UA Negative Negative    Glucose,  (A) Negative, Normal    Ketones, UA Trace (A) Negative    Blood, UA Negative Negative    Bilirubin, UA Negative Negative    Urobilinogen, UA 0.2 0.2, 1.0, Normal    Nitrites, UA Negative Negative    Leukocyte Esterase, UA Negative Negative        No results found.       Assessment and Plan       Bipolar with yousif         [1]   Social History  Tobacco Use    Smoking status: Every Day     Current packs/day: 0.50     Types: Cigarettes    Smokeless tobacco: Never   Substance Use Topics    Alcohol use: Not Currently    Drug use: Not Currently     Types: Opium, Marijuana

## 2025-04-03 NOTE — PLAN OF CARE
Problem: Adult Behavioral Health Plan of Care  Goal: Plan of Care Review  4/3/2025 0938 by Oscar Gallegos RN  Outcome: Progressing  Flowsheets (Taken 4/3/2025 0938)  Patient Agreement with Plan of Care: agrees  Plan of Care Reviewed With: patient  4/3/2025 0937 by Oscar Gallegos RN  Outcome: Progressing  Goal: Patient-Specific Goal (Individualization)  4/3/2025 0938 by Oscar Gallegos RN  Outcome: Progressing  Flowsheets (Taken 4/3/2025 0938)  Patient Personal Strengths: independent living skills  Patient Vulnerabilities: substance abuse/addiction  4/3/2025 0937 by Oscar Gallegos RN  Outcome: Progressing  Goal: Adheres to Safety Considerations for Self and Others  4/3/2025 0938 by Oscar Gallegos RN  Outcome: Progressing  Flowsheets (Taken 4/3/2025 0938)  Adheres to Safety Considerations for Self and Others: making progress toward outcome  4/3/2025 0937 by Oscar Gallegos RN  Outcome: Progressing  Intervention: Develop and Maintain Individualized Safety Plan  Flowsheets (Taken 4/3/2025 0938)  Safety Measures: safety rounds completed  Goal: Absence of New-Onset Illness or Injury  4/3/2025 0938 by Oscar Gallegos RN  Outcome: Progressing  4/3/2025 0937 by Oscar Gallegos RN  Outcome: Progressing  Intervention: Identify and Manage Fall Risk  Flowsheets (Taken 4/3/2025 0938)  Safety Measures: safety rounds completed  Intervention: Prevent VTE (Venous Thromboembolism)  Flowsheets (Taken 4/3/2025 0938)  VTE Prevention/Management: fluids promoted  Intervention: Prevent Infection  Flowsheets (Taken 4/3/2025 0938)  Infection Prevention: rest/sleep promoted  Goal: Optimized Coping Skills in Response to Life Stressors  4/3/2025 0938 by Oscar Gallegos RN  Outcome: Progressing  Flowsheets (Taken 4/3/2025 0938)  Optimized Coping Skills in Response to Life Stressors: making progress toward outcome  4/3/2025 0937 by Oscar Gallegos RN  Outcome: Progressing  Intervention: Promote Effective Coping Strategies  Flowsheets (Taken 4/3/2025 0938)  Supportive  Measures: self-care encouraged  Goal: Develops/Participates in Therapeutic [place] to Support Successful Transition  4/3/2025 0938 by Oscar Gallegos RN  Outcome: Progressing  Flowsheets (Taken 4/3/2025 0938)  Develops/Participates in Therapeutic [place] to Support Successful Transition: making progress toward outcome  4/3/2025 0937 by Oscar Gallegos RN  Outcome: Progressing  Intervention: Foster Therapeutic [place]  Flowsheets (Taken 4/3/2025 0938)  Trust Relationship/Rapport: care explained  Goal: Rounds/Family Conference  4/3/2025 0938 by Oscar Gallegos RN  Outcome: Progressing  Flowsheets (Taken 4/3/2025 0938)  Participants:   milieu/psych techs   nursing  4/3/2025 0937 by Oscar Gallegos RN  Outcome: Progressing     Problem: Psychotic Signs/Symptoms  Goal: Improved Behavioral Control (Psychotic Signs/Symptoms)  4/3/2025 0938 by Oscar Gallegos RN  Outcome: Progressing  Flowsheets (Taken 4/3/2025 0938)  Mutually Determined Action Steps (Improved Behavioral Control): identifies symptoms triggers  4/3/2025 0937 by Oscar Gallegos RN  Outcome: Progressing  Intervention: Manage Behavior  Flowsheets (Taken 4/3/2025 0938)  De-Escalation Techniques: quiet time facilitated  Goal: Optimal Cognitive Function (Psychotic Signs/Symptoms)  4/3/2025 0938 by Oscar Gallegos RN  Outcome: Progressing  Flowsheets (Taken 4/3/2025 0938)  Mutually Determined Action Steps (Optimal Cognitive Function): participates in attention training  4/3/2025 0937 by Oscar Gallegos RN  Outcome: Progressing  Intervention: Support and Promote Cognitive Ability  Flowsheets (Taken 4/3/2025 0938)  Trust Relationship/Rapport: care explained  Goal: Increased Participation and Engagement (Psychotic Signs/Symptoms)  4/3/2025 0938 by Oscar Gallegos RN  Outcome: Progressing  Flowsheets (Taken 4/3/2025 0938)  Mutually Determined Action Steps (Increased Participation and Engagement): identifies symptoms triggers  4/3/2025 0937 by Oscar Gallegos RN  Outcome:  Progressing  Intervention: Facilitate Participation and Engagement  Flowsheets (Taken 4/3/2025 0938)  Supportive Measures: self-care encouraged  Diversional Activity: television  Goal: Improved Mood Symptoms (Psychotic Signs/Symptoms)  4/3/2025 0938 by Oscar Gallegos RN  Outcome: Progressing  Flowsheets (Taken 4/3/2025 0938)  Mutually Determined Action Steps (Improved Mood Symptoms): acknowledges progress  4/3/2025 0937 by Oscar Gallegos RN  Outcome: Progressing  Intervention: Optimize Emotion and Mood  Flowsheets (Taken 4/3/2025 0938)  Supportive Measures: self-care encouraged  Diversional Activity: television  Goal: Improved Psychomotor Symptoms (Psychotic Signs/Symptoms)  4/3/2025 0938 by Oscar Gallegos RN  Outcome: Progressing  Flowsheets (Taken 4/3/2025 0938)  Mutually Determined Action Steps (Improved Psychomotor Symptoms): exhibits decrease in agitation  4/3/2025 0937 by Oscar Gallegos RN  Outcome: Progressing  Intervention: Manage Psychomotor Movement  Flowsheets (Taken 4/3/2025 0938)  Activity (Behavioral Health): up ad [place]  Patient Performed Hygiene: teeth brushed  Diversional Activity: television  Goal: Decreased Sensory Symptoms (Psychotic Signs/Symptoms)  4/3/2025 0938 by Oscar Gallegos RN  Outcome: Progressing  Flowsheets (Taken 4/3/2025 0938)  Mutually Determined Action Steps (Decreased Sensory Symptoms): shares insight re: need for meds  4/3/2025 0937 by Oscar Gallegos RN  Outcome: Progressing  Intervention: Minimize and Manage Sensory Impairment  Flowsheets (Taken 4/3/2025 0938)  Sensory Stimulation Regulation: quiet environment promoted  Goal: Improved Sleep (Psychotic Signs/Symptoms)  4/3/2025 0938 by Oscar Gallegos RN  Outcome: Progressing  Flowsheets (Taken 4/3/2025 0938)  Mutually Determined Action Steps (Improved Sleep): sleeps 4-6 hours at night  4/3/2025 0937 by Oscar Gallegos RN  Outcome: Progressing  Intervention: Promote Healthy Sleep Hygiene  Flowsheets (Taken 4/3/2025 0938)  Sleep Hygiene Promotion:  regular sleep pattern promoted  Goal: Enhanced Social, Occupational or Functional Skills (Psychotic Signs/Symptoms)  4/3/2025 0938 by Oscar Gallegos, RN  Outcome: Progressing  Flowsheets (Taken 4/3/2025 0938)  Mutually Determined Action Steps (Enhanced Social, Occupational or Functional Skills): participates in social skills training  4/3/2025 0937 by Oscar Gallegos, RN  Outcome: Progressing  Intervention: Promote Social, Occupational and Functional Ability  Flowsheets (Taken 4/3/2025 0938)  Trust Relationship/Rapport: care explained  Social Functional Ability Promotion: autonomy promoted     Problem: Excessive Substance Use  Goal: Optimized Energy Level (Excessive Substance Use)  Outcome: Progressing  Flowsheets (Taken 4/3/2025 0938)  Mutually Determined Action Steps (Optimized Energy Level): grooms self without prompting  Intervention: Optimize Energy Level  Flowsheets (Taken 4/3/2025 0938)  Activity (Behavioral Health): up ad [place]  Patient Performed Hygiene: teeth brushed  Diversional Activity: television  Goal: Improved Behavioral Control (Excessive Substance Use)  Outcome: Progressing  Flowsheets (Taken 4/3/2025 0938)  Mutually Determined Action Steps (Improved Behavioral Control): identifies major stressors  Intervention: Promote Behavior and Impulse Control  Flowsheets (Taken 4/3/2025 0938)  Behavior Management: behavioral plan reviewed  Goal: Increased Participation and Engagement (Excessive Substance Use)  Outcome: Progressing  Flowsheets (Taken 4/3/2025 0938)  Mutually Determined Action Steps (Increased Participation and Engagement): discusses ongoing recovery plan  Intervention: Facilitate Participation and Engagement  Flowsheets (Taken 4/3/2025 0938)  Supportive Measures: self-care encouraged  Diversional Activity: television  Goal: Improved Physiologic Symptoms (Excessive Substance Use)  Outcome: Progressing  Flowsheets (Taken 4/3/2025 0938)  Mutually Determined Action Steps (Improved Physiologic Symptoms):  discusses use pattern  Intervention: Optimize Physiologic Function  Flowsheets (Taken 4/3/2025 0938)  Oral Nutrition Promotion: rest periods promoted  Nutrition Interventions: food preferences provided  Goal: Enhanced Social, Occupational or Functional Skills (Excessive Substance Use)  Outcome: Progressing  Flowsheets (Taken 4/3/2025 0938)  Mutually Determined Action Steps (Enhanced Social, Occupational or Functional Skills): participates in social skills training  Intervention: Promote Social, Occupational and Functional Ability  Flowsheets (Taken 4/3/2025 0938)  Trust Relationship/Rapport: care explained  Social Functional Ability Promotion: autonomy promoted    He is oriented X 4. Sleepy this AM due to him receiving PRN medication last night. Denies SI and HI. Endorses hallucinations and delusions. Fair eye contact noted. Speech low and slowed at present time. Guarded. Minimal interactions noted with other patients and staff. Continue plan of care and provide a safe and therapeutic environment. Continue to monitor every fifteen minutes for safety.

## 2025-04-03 NOTE — PLAN OF CARE
Problem: Adult Behavioral Health Plan of Care  Goal: Plan of Care Review  4/2/2025 2252 by Marley Matamoros RN  Outcome: Not Progressing     Problem: Adult Behavioral Health Plan of Care  Goal: Patient-Specific Goal (Individualization)  4/2/2025 2252 by Marley Matamoros RN  Outcome: Not Progressing     Problem: Adult Behavioral Health Plan of Care  Goal: Adheres to Safety Considerations for Self and Others  4/2/2025 2252 by Marley Matamoros RN  Outcome: Not Progressing     Problem: Adult Behavioral Health Plan of Care  Goal: Adheres to Safety Considerations for Self and Others  4/2/2025 2253 by Marley Matamoros RN  Outcome: Not Progressing    Problem: Adult Behavioral Health Plan of Care  Goal: Optimized Coping Skills in Response to Life Stressors  4/2/2025 2253 by Marley Matamoros RN  Outcome: Not Progressing     Problem: Adult Behavioral Health Plan of Care  Goal: Develops/Participates in Therapeutic [place] to Support Successful Transition  4/2/2025 2253 by Marley Matamoros RN  Outcome: Not Progressing     Problem: Adult Behavioral Health Plan of Care  Goal: Rounds/Family Conference  4/2/2025 2253 by Marley Matamoros RN  Outcome: Not Progressing     Problem: Psychotic Signs/Symptoms  Goal: Improved Behavioral Control (Psychotic Signs/Symptoms)  4/2/2025 2253 by Marley Matamoros RN  Outcome: Not Progressing    Problem: Psychotic Signs/Symptoms  Goal: Optimal Cognitive Function (Psychotic Signs/Symptoms)  4/2/2025 2253 by Marley Matamoros RN  Outcome: Not Progressing     Problem: Psychotic Signs/Symptoms  Goal: Increased Participation and Engagement (Psychotic Signs/Symptoms)  4/2/2025 2253 by Marley Matamoros RN  Outcome: Not Progressing     Problem: Psychotic Signs/Symptoms  Goal: Improved Mood Symptoms (Psychotic Signs/Symptoms)  4/2/2025 2253 by Marley Matamoros RN  Outcome: Not Progressing     Problem: Psychotic Signs/Symptoms  Goal: Improved Psychomotor Symptoms (Psychotic Signs/Symptoms)  4/2/2025 2253 by  Marley Matamoros RN  Outcome: Not Progressing     Problem: Psychotic Signs/Symptoms  Goal: Decreased Sensory Symptoms (Psychotic Signs/Symptoms)  4/2/2025 2253 by Marley Matamoros RN  Outcome: Not Progressing     Problem: Psychotic Signs/Symptoms  Goal: Improved Sleep (Psychotic Signs/Symptoms)  4/2/2025 2253 by Marley Matamoros RN  Outcome: Not Progressing     Problem: Psychotic Signs/Symptoms  Goal: Enhanced Social, Occupational or Functional Skills (Psychotic Signs/Symptoms)  4/2/2025 2253 by Marley Matamoros, RN  Outcome: Not Progressing            No

## 2025-04-03 NOTE — PROGRESS NOTES
04/03/25 1000   [facility] Group Therapy   Group Name Therapeutic Recreation   Specific Interventions Skilled Activity Mild Exercises   Participation Level None   Participation Quality Sleeping  (excused)

## 2025-04-03 NOTE — GROUP NOTE
Group Psychotherapy       Group Focus: Communication Skills    Group topic: Communication Skills: Therapist explored patients need for increasing communication skills through assertiveness or active listening. Patients were given the opportunity to explore positive self-talk.     Number of patients in attendance: 8  Group Start Time: 1045  Group End Time:  1130  Groups Date: 4/3/2025  Group Topic:  Behavioral Health  Group Department: [facility] - Behavioral Health Unit  Group Facilitators:  Kary Joshua MSW  _____________________________________________________________________    Patient Name: Oscar Canseco  MRN: [id number]  Patient Class: IP- Psych   Admission Date\Time: 4/2/2025  7:58 PM  Hospital Length of Stay: 2  Primary Care Provider: Edwina Jung NP (Inactive)     Referred by: Acute Psychiatry Unit Treatment Team     Target symptoms: Depression and Poor Coping Skills     Patient's response to treatment: Not Participating; Pt was not present in group session; alternative provided.      Progress toward goals: Not progressing    Plan: Continue treatment on APU

## 2025-04-03 NOTE — PLAN OF CARE
Problem: Adult Behavioral Health Plan of Care  Goal: Plan of Care Review  Outcome: Unable to Meet  Goal: Patient-Specific Goal (Individualization)  Outcome: Unable to Meet  Goal: Adheres to Safety Considerations for Self and Others  Outcome: Unable to Meet  Goal: Absence of New-Onset Illness or Injury  Outcome: Unable to Meet  Goal: Optimized Coping Skills in Response to Life Stressors  Outcome: Unable to Meet  Goal: Develops/Participates in Therapeutic [place] to Support Successful Transition  Outcome: Unable to Meet  Goal: Rounds/Family Conference  Outcome: Unable to Meet     Problem: Psychotic Signs/Symptoms  Goal: Improved Behavioral Control (Psychotic Signs/Symptoms)  Outcome: Unable to Meet  Goal: Optimal Cognitive Function (Psychotic Signs/Symptoms)  Outcome: Unable to Meet  Goal: Increased Participation and Engagement (Psychotic Signs/Symptoms)  Outcome: Unable to Meet  Goal: Improved Mood Symptoms (Psychotic Signs/Symptoms)  Outcome: Unable to Meet  Goal: Improved Psychomotor Symptoms (Psychotic Signs/Symptoms)  Outcome: Unable to Meet  Goal: Decreased Sensory Symptoms (Psychotic Signs/Symptoms)  Outcome: Unable to Meet  Goal: Improved Sleep (Psychotic Signs/Symptoms)  Outcome: Unable to Meet  Goal: Enhanced Social, Occupational or Functional Skills (Psychotic Signs/Symptoms)  Outcome: Unable to Meet

## 2025-04-03 NOTE — H&P
4/3/2025  Oscar Canseco   1990   [id number]            Psychiatry Inpatient Admission Note    Date of Admission: 4/2/2025  7:58 PM    Current Legal Status: Physician's Emergency Certificate    Chief Complaint: Heidi    SUBJECTIVE:   History of Present Illness:   Oscar Canseco is a 34-year-old male with a known history of bipolar disorder with manic episodes, placed under a Physicians Emergency Certificate (PEC) at [facility], after presenting to [facility] ED on 4/2/2025 with his mother during what was described as an active manic episode. The patient admitted to poor adherence to his psychiatric medications, then diverted the conversation to discussing nicotine use, showing flight of ideas and distractibility. He was noted to be pacing and clenching his fists, though he denied suicidal ideation. When asked directly about homicidal ideation, he refused to answer, and although he did not make direct threats, his mother reported that if there was anyone he may target, it could be his father. The patient did not confirm or deny this when questioned. Although he denies auditory hallucinations, his behavioral cues--including distracted responses and unusual movements--suggest the possibility of responding to internal stimuli.    According to staff, he exhibited manic and disorganized behavior the prior evening, requiring an oral PRN, which he accepted and tolerated. During todays evaluation, the patient presented as calm and cooperative, but continued to demonstrate paranoid behavior, including hypervigilance, scanning the room, and taking mental notes during the interview. While he maintained attention, his overly observant and suspicious demeanor was notable.    He was recently hospitalized here in November 2024 for a similar presentation of heidi due to medication noncompliance, and during this encounter again expressed that Zyprexa had previously been effective in managing his symptoms.    Assessment:  The patient  is currently experiencing an acute manic episode marked by psychomotor agitation, paranoia, poor insight, and possible psychotic features. His noncompliance with medication, history of recurrent hospitalizations, and refusal to engage in safety-related questioning raise significant concern. Although not overtly threatening, the combination of paranoia, irritability, and unresolved homicidal risk (as reported by his mother) increases the need for inpatient stabilization.        Past Psychiatric History:   Previous Psychiatric Hospitalizations: Multiple   Previous Medication Trials: Multiple  Previous Suicide Attempts: Denies   Outpatient psychiatrist: [facility]    Current Medications:   Home Psychiatric Meds: Seroquel    Past Medical/Surgical History:   Past Medical History:   Diagnosis Date    Bipolar disorder     History of psychiatric hospitalization     Hx of psychiatric care     Therapy      No past surgical history on file.    Family Psychiatric History:   Grandmother      Allergies:   Review of patient's allergies indicates:  No Known Allergies    Substance Abuse History:   Tobacco: 1 PPD + 2 Cigars  Alcohol: Beer or two once a week  Illicit Substances: THC  Treatment: Yes        Scheduled Meds:    nicotine  1 patch Transdermal Daily      PRN Meds:   Current Facility-Administered Medications:     acetaminophen, 650 mg, Oral, Q6H PRN    aluminum-magnesium hydroxide-simethicone, 30 mL, Oral, Q6H PRN    haloperidoL, 5 mg, Oral, Q6H PRN **AND** diphenhydrAMINE, 50 mg, Oral, Q6H PRN **AND** LORazepam, 2 mg, Oral, Q6H PRN **AND** haloperidol lactate, 5 mg, Intramuscular, Q6H PRN **AND** diphenhydrAMINE, 50 mg, Intramuscular, Q6H PRN **AND** lorazepam, 2 mg, Intramuscular, Q6H PRN    hydrOXYzine HCL, 50 mg, Oral, Q4H PRN    traZODone, 100 mg, Oral, Nightly PRN   Psychotherapeutics (From admission, onward)      Start     Stop Route Frequency Ordered    04/02/25 2048  traZODone tablet 100 mg      "    -- Oral Nightly PRN 04/02/25 2048 04/02/25 2048  haloperidoL tablet 5 mg  (Med - Acute  Behavioral Management)        Placed in "And" Linked Group    -- Oral Every 6 hours PRN 04/02/25 2048 04/02/25 2048  LORazepam tablet 2 mg  (Med - Acute  Behavioral Management)        Placed in "And" Linked Group    -- Oral Every 6 hours PRN 04/02/25 2048 04/02/25 2048  haloperidol lactate injection 5 mg  (Med - Acute  Behavioral Management)        Placed in "And" Linked Group    -- IM Every 6 hours PRN 04/02/25 2048 04/02/25 2048  LORazepam injection 2 mg  (Med - Acute  Behavioral Management)        Placed in "And" Linked Group    -- IM Every 6 hours PRN 04/02/25 2048              Social History:  Housing Status: Lives in camper at friends house  Relationship Status/Sexual Orientation: Single   Children: Denies  Education: High school   Employment Status/Info: Disability    history: Denies  History of physical/sexual abuse: Denies   Access to gun: Denies       Legal History:   Past Charges/Incarcerations: Yes   Pending charges: Denies      OBJECTIVE:   Medical Review Of Systems:  A comprehensive review of systems was negative.    Vitals   Vitals:    04/03/25 0701   BP: 116/79   Pulse: 73   Resp: 16   Temp: 98.5 °F (36.9 °C)        Labs/Imaging/Studies:   Recent Results (from the past 48 hours)   Comprehensive Metabolic Panel    Collection Time: 04/02/25  2:44 PM   Result Value Ref Range    Sodium 138 136 - 145 mmol/L    Potassium 3.7 3.5 - 5.1 mmol/L    Chloride 108 (H) 98 - 107 mmol/L    CO2 21 (L) 22 - 29 mmol/L    Glucose 211 (H) 74 - 100 mg/dL    Blood Urea Nitrogen 20.6 8.9 - 20.6 mg/dL    Creatinine 1.02 0.72 - 1.25 mg/dL    Calcium 8.9 8.4 - 10.2 mg/dL    Protein Total 7.8 6.4 - 8.3 gm/dL    Albumin 4.2 3.5 - 5.0 g/dL    Globulin 3.6 (H) 2.4 - 3.5 gm/dL    Albumin/Globulin Ratio 1.2 1.1 - 2.0 ratio    Bilirubin Total 0.5 <=1.5 mg/dL    ALP 59 40 - 150 unit/L    ALT 15 0 - 55 unit/L    AST 22 11 - " 45 unit/L    eGFR >60 mL/min/1.73/m2    Anion Gap 9.0 mEq/L    BUN/Creatinine Ratio 20    Acetaminophen Level    Collection Time: 04/02/25  2:44 PM   Result Value Ref Range    Acetaminophen Level <3.0 (L) 10.0 - 30.0 ug/ml   Salicylate Level    Collection Time: 04/02/25  2:44 PM   Result Value Ref Range    Salicylate Level <5.0 (L) 15.0 - 30.0 mg/dL   CBC with Differential    Collection Time: 04/02/25  2:44 PM   Result Value Ref Range    WBC 11.49 4.50 - 11.50 x10(3)/mcL    RBC 4.35 (L) 4.70 - 6.10 x10(6)/mcL    Hgb 13.9 (L) 14.0 - 18.0 g/dL    Hct 40.2 (L) 42.0 - 52.0 %    MCV 92.4 80.0 - 94.0 fL    MCH 32.0 (H) 27.0 - 31.0 pg    MCHC 34.6 33.0 - 36.0 g/dL    RDW 12.5 11.5 - 17.0 %    Platelet 251 130 - 400 x10(3)/mcL    MPV 9.4 7.4 - 10.4 fL    Neut % 84.3 %    Lymph % 9.1 %    Mono % 6.1 %    Eos % 0.1 %    Basophil % 0.2 %    Imm Grans % 0.2 %    Neut # 9.69 (H) 2.1 - 9.2 x10(3)/mcL    Lymph # 1.05 0.6 - 4.6 x10(3)/mcL    Mono # 0.70 0.1 - 1.3 x10(3)/mcL    Eos # 0.01 0 - 0.9 x10(3)/mcL    Baso # 0.02 <=0.2 x10(3)/mcL    Imm Gran # 0.02 0.00 - 0.04 x10(3)/mcL   Ethanol    Collection Time: 04/02/25  2:44 PM   Result Value Ref Range    Ethanol Level <10.0 <=10.0 mg/dL   Drug Screen, Urine    Collection Time: 04/02/25  3:10 PM   Result Value Ref Range    Amphetamines, Urine Negative Negative    Barbiturates, Urine Negative Negative    Benzodiazepine, Urine Negative Negative    Cannabinoids, Urine Positive (A) Negative    Cocaine, Urine Negative Negative    Opiates, Urine Negative Negative    Phencyclidine, Urine Negative Negative    Fentanyl, Urine Negative Negative    pH, Urine 6.0 3.0 - 11.0    Specific Gravity, Urine Auto 1.020 1.001 - 1.035   Urinalysis, Reflex to Urine Culture    Collection Time: 04/02/25  3:10 PM    Specimen: Urine   Result Value Ref Range    Color, UA Yellow Yellow, Light-Yellow, Dark Yellow, Lesly, Straw    Appearance, UA Clear Clear    Specific Gravity, UA 1.020 1.005 - 1.030    pH, UA 6.0  "5.0 - 8.5    Protein, UA Negative Negative    Glucose,  (A) Negative, Normal    Ketones, UA Trace (A) Negative    Blood, UA Negative Negative    Bilirubin, UA Negative Negative    Urobilinogen, UA 0.2 0.2, 1.0, Normal    Nitrites, UA Negative Negative    Leukocyte Esterase, UA Negative Negative      No results found for: "PHENYTOIN", "PHENOBARB", "VALPROATE", "CBMZ"        Psychiatric Mental Status Exam:  General Appearance: appears stated age, well developed and nourished, adequately groomed and appropriately dressed, in no acute distress  Arousal: alert  Behavior: normal; cooperative; reasonably friendly, pleasant, and polite; appropriate eye-contact; under good behavioral control  Movements and Motor Activity: no abnormal involuntary movements noted; no tics, no tremors, no akathisia, no dystonia, no evidence of tardive dyskinesia; no psychomotor agitation or retardation  Orientation: oriented to person, place, time, and situation  Speech: intact; normal rate, rhythm, volume, tone and pitch; conversational, spontaneous, and coherent  Mood: Dysphoric and Guarded  Affect: constricted  Thought Process: intact, linear, goal-directed, organized, logical  Associations: intact, no loosening of associations  Thought Content and Perceptions: no suicidal or homicidal ideation, no auditory or visual hallucinations, no paranoid ideation, no ideas of reference, no evidence of delusions or psychosis  Recent and Remote Memory: grossly intact, able to recall relevant and salient information from the recent and remote past; per interview/observation with patient  Attention and Concentration: grossly intact, attentive to the conversation and not readily distractible; per interview/observation with patient  Fund of Knowledge: grossly intact, used appropriate vocabulary and demonstrated an awareness of current events; based on history, vocabulary, fund of knowledge, syntax, grammar, and content  Insight: intact; based on " understanding of severity of illness and HPI  Judgment: questionable; based on patient's behavior and HPI      Patient Strengths:  Access to care, Able to verbalize needs, Stable physical health, Family/Peer support, and Motivation for treatment      Patient Liabilities:  Medication non-compliance, Psychosis, Heidi, and Chronic psychiatric illness      Discharge Criteria:  Improved mood, Improved thought process, Medication compliance, and Improved social functioning      Reason for Admission:  The patient poses a significant and immediate danger to self., The patient is gravely disabled due to a psychiatric condition., The psychiatric disorder requires intensive treatment that necessitates 24 hour observation and care., The patient presents with psychiatric symptoms of sufficient severity to bring about significant or profound impairment of day to day psychological, social, vocational, and/or educational functioning., and To stabilize an acute exacerbation of a severe persistent mental disorder.    ASSESSMENT/PLAN:   Diagnoses:  MOOD DISORDERS; Bipolar I Disorder, Most Recent Episode Manic: 6.7.4.Severe With Psychotic Features (F31.2)         Past Medical History:   Diagnosis Date    Bipolar disorder     History of psychiatric hospitalization     Hx of psychiatric care     Therapy           Problem lists and Management Plans:  -Admit to [facility]    Bipolar disorder with psychosis  -Zyprexa Zydis 10 mg QHS  -Plan to discuss possible initiation of an LEO once patient is more coherent     -Will attempt to obtain outside psychiatric records if available  - to assist with aftercare planning and collateral  -Continue inpatient treatment as evidenced by significant psychotic thought disorder, danger to self, danger to others, and gravely disabled      Estimated length of stay: 5-7    Estimated Disposition: Home    Estimated Follow-up: Outpatient medication management          Ahsan Almonte PMAVA-BC

## 2025-04-03 NOTE — NURSING
"2111 pt pacing hallway, asking for a nicotine patch, appears anxious. Informed pt that he will get a nicotine patch in the morning and offered PRN medication to help with anxiety. Pt stated that he would like "some relief". Administered PRN atarax 50 mg at this time.     2145 atarax not effective, no change seen. Pt continues to pace in hallway, seen responding to internal stimuli, requires frequent redirection.     2233 pt continuing to pace but is accelerating speed, offered PRN medication to help with sleep and pt laid down on the floor in the hallway by the [place] dayroom. Pt states that he will try the PRN medication but would prefer that he not know the name or dosage. Administered PRN trazodone 100 mg at this time. Pt's mattress moved into quiet room as he continues to pace, talk to himself, c/o his roommate's snoring.     2336 pt continuing to pace, writing on his pt handbook with a marker, continues to ask for a nicotine patch, continues to respond to internal stimuli and c/o feeling agitated. Pt apologized to staff for "blowing up," reassured pt that he has not offended or intimidated anyone here, pt smiled and stated "I need help,". Offered PO B52 and pt stated that he will take it however wandered off required additional redirection. Administered PRN ativan 2 mg, benadryl 50 mg and haldol 5 mg PO at this time.     0030 pt noted to be resting quietly in bed in the quiet room with eyes closed. Respirations even/unlabored. No signs of distress or discomfort noted at this time.   "

## 2025-04-04 PROCEDURE — 25000003 PHARM REV CODE 250

## 2025-04-04 PROCEDURE — 25000003 PHARM REV CODE 250: Performed by: PSYCHIATRY & NEUROLOGY

## 2025-04-04 PROCEDURE — S4991 NICOTINE PATCH NONLEGEND: HCPCS | Performed by: PSYCHIATRY & NEUROLOGY

## 2025-04-04 PROCEDURE — 11400000 HC PSYCH PRIVATE ROOM

## 2025-04-04 RX ADMIN — NICOTINE 1 PATCH: 21 PATCH, EXTENDED RELEASE TRANSDERMAL at 08:04

## 2025-04-04 RX ADMIN — OLANZAPINE 10 MG: 5 TABLET, FILM COATED ORAL at 08:04

## 2025-04-04 NOTE — PROGRESS NOTES
Oscar is a 33y/o male admitted for Bipolar I Disorder, Most Recent Episode Manic: 6.7.4.Severe With Psychotic Features (F31.2) with a uds +cannabis. CTRS met with Pt 1:1, Oscar was guarded and irritable but cooperative, reported admission as I worried my Mom and Dad staying too long , and ability to perform his ADL's. CTRS educated Pt to TR group times and dates with Oscar agreeing to attend TR groups. Oscar reported treatment goal as Learn how to handle frustration with out walking away.       04/03/25 0819   General   Admit Date 04/02/25   Primary Diagnosis Bipolar I Disorder, Most Recent Episode Manic: 6.7.4.Severe With Psychotic Features (F31.2)   [employer] spiritual   Number of Children 0   Children Living? 0   Occupation SSDI   Does the patient have dentures? No   If you were to take part in activities, which of the following would you prefer? Activities that you do alone   Do you feel like you have enough to keep you busy now? Yes   Do you believe that you have the opportunity for physical activity? Yes   Activity Capabilities Maximum   Subjective   Patient states I worried my Mom and Dad staying too long   Assessment   Mobility ambulates independently   Transfers independently   Musculoskeletal   (none reported)   Visual Acuity normal vision   Visual Perception depth perception;color perception;recognizes letters;recognizes numbers   Hearing normal   Speech/Communication normal   Cognitive Concerns oriented x4   Emotional Concerns appears anxious   Leisure Interest Survey   Leisure Interest Survey Yes   Solitary Activities   Computer Activities Current Interest   Watching Videos Current Interest   Music Listening Current Interest   Reading Current Interest   Physical Activities   Fitness/Exercise Programs Current Interest   Walk/Run Current Interest   Creative Activities   Photography Current Interest   Other Creative Activity   (keeping fowl and mixing music)   Outdoor Activities   Fishing Current Interest    Gardening Current Interest   Camping Current Interest   Water Sports Current Interest   Hiking Current Interest   Spectator Events   Movies Current Interest   Passive Games   Trivia Games Current Interest   Classic Board Games Current Interest   Card Games Current Interest   Goals   Additional Documentation yes   Goal Formulation With patient   Time For Goal Achievement 7 days   Goal 1 Learn how to handle frustration with out walking away   Goal 1-Progress Ongoing-   Plan   Planned Therapy Intervention Group Recreational Therapy   Expected Length of Stay 5-7days   PT Frequency Minimum of 3 visits per week

## 2025-04-04 NOTE — PROGRESS NOTES
04/04/25 1000   [facility] Group Therapy   Group Name Therapeutic Recreation   Specific Interventions Skilled Activity Mild Exercises   Participation Level Active;Supportive;Appropriate;Attentive   Participation Quality Cooperative;Withdrawn   Insight/Motivation Limited   Affect/Mood Display Restless   Cognition Oriented;Alert;Pre-Occupied   Psychomotor WNL

## 2025-04-04 NOTE — PLAN OF CARE
Problem: Adult Behavioral Health Plan of Care  Goal: Plan of Care Review  Outcome: Progressing  Flowsheets (Taken 4/4/2025 0901)  Patient Agreement with Plan of Care: agrees  Plan of Care Reviewed With: patient  Goal: Patient-Specific Goal (Individualization)  Outcome: Progressing  Flowsheets (Taken 4/4/2025 0901)  Patient Personal Strengths: independent living skills  Patient Vulnerabilities: substance abuse/addiction  Goal: Adheres to Safety Considerations for Self and Others  Outcome: Progressing  Flowsheets (Taken 4/4/2025 0901)  Adheres to Safety Considerations for Self and Others: making progress toward outcome  Intervention: Develop and Maintain Individualized Safety Plan  Flowsheets (Taken 4/4/2025 0901)  Safety Measures: safety rounds completed  Goal: Absence of New-Onset Illness or Injury  Outcome: Progressing  Intervention: Identify and Manage Fall Risk  Flowsheets (Taken 4/4/2025 0901)  Safety Measures: safety rounds completed  Intervention: Prevent VTE (Venous Thromboembolism)  Flowsheets (Taken 4/4/2025 0901)  VTE Prevention/Management: fluids promoted  Intervention: Prevent Infection  Flowsheets (Taken 4/4/2025 0901)  Infection Prevention: rest/sleep promoted  Goal: Optimized Coping Skills in Response to Life Stressors  Outcome: Progressing  Flowsheets (Taken 4/4/2025 0901)  Optimized Coping Skills in Response to Life Stressors: making progress toward outcome  Intervention: Promote Effective Coping Strategies  Flowsheets (Taken 4/4/2025 0901)  Supportive Measures: self-care encouraged  Goal: Develops/Participates in Therapeutic [place] to Support Successful Transition  Outcome: Progressing  Flowsheets (Taken 4/4/2025 0901)  Develops/Participates in Therapeutic [place] to Support Successful Transition: making progress toward outcome  Intervention: Foster Therapeutic [place]  Flowsheets (Taken 4/4/2025 0901)  Trust Relationship/Rapport: care explained  Goal: Rounds/Family Conference  Outcome:  Progressing  Flowsheets (Taken 4/4/2025 0901)  Participants:   milieu/psych techs   nursing     Problem: Psychotic Signs/Symptoms  Goal: Improved Behavioral Control (Psychotic Signs/Symptoms)  Outcome: Progressing  Flowsheets (Taken 4/4/2025 0901)  Mutually Determined Action Steps (Improved Behavioral Control): identifies symptoms triggers  Intervention: Manage Behavior  Flowsheets (Taken 4/4/2025 0901)  De-Escalation Techniques: quiet time facilitated  Goal: Optimal Cognitive Function (Psychotic Signs/Symptoms)  Outcome: Progressing  Flowsheets (Taken 4/4/2025 0901)  Mutually Determined Action Steps (Optimal Cognitive Function): participates in attention training  Intervention: Support and Promote Cognitive Ability  Flowsheets (Taken 4/4/2025 0901)  Trust Relationship/Rapport: care explained  Goal: Increased Participation and Engagement (Psychotic Signs/Symptoms)  Outcome: Progressing  Flowsheets (Taken 4/4/2025 0901)  Mutually Determined Action Steps (Increased Participation and Engagement): identifies symptoms triggers  Intervention: Facilitate Participation and Engagement  Flowsheets (Taken 4/4/2025 0901)  Supportive Measures: self-care encouraged  Diversional Activity: television  Goal: Improved Mood Symptoms (Psychotic Signs/Symptoms)  Outcome: Progressing  Flowsheets (Taken 4/4/2025 0901)  Mutually Determined Action Steps (Improved Mood Symptoms): acknowledges progress  Intervention: Optimize Emotion and Mood  Flowsheets (Taken 4/4/2025 0901)  Supportive Measures: self-care encouraged  Diversional Activity: television  Goal: Improved Psychomotor Symptoms (Psychotic Signs/Symptoms)  Outcome: Progressing  Flowsheets (Taken 4/4/2025 0901)  Mutually Determined Action Steps (Improved Psychomotor Symptoms): adheres to medication regimen  Intervention: Manage Psychomotor Movement  Flowsheets (Taken 4/4/2025 0901)  Activity (Behavioral Health): up ad duncan  Patient Performed Hygiene: teeth brushed  Diversional  Activity: television  Goal: Decreased Sensory Symptoms (Psychotic Signs/Symptoms)  Outcome: Progressing  Flowsheets (Taken 4/4/2025 0901)  Mutually Determined Action Steps (Decreased Sensory Symptoms): adheres to medication regimen  Intervention: Minimize and Manage Sensory Impairment  Flowsheets (Taken 4/4/2025 0901)  Sensory Stimulation Regulation: quiet environment promoted  Goal: Improved Sleep (Psychotic Signs/Symptoms)  Outcome: Progressing  Flowsheets (Taken 4/4/2025 0901)  Mutually Determined Action Steps (Improved Sleep): sleeps 4-6 hours at night  Intervention: Promote Healthy Sleep Hygiene  Flowsheets (Taken 4/4/2025 0901)  Sleep Hygiene Promotion: regular sleep pattern promoted  Goal: Enhanced Social, Occupational or Functional Skills (Psychotic Signs/Symptoms)  Outcome: Progressing  Flowsheets (Taken 4/4/2025 0901)  Mutually Determined Action Steps (Enhanced Social, Occupational or Functional Skills): participates in social skills training  Intervention: Promote Social, Occupational and Functional Ability  Flowsheets (Taken 4/4/2025 0901)  Trust Relationship/Rapport: care explained  Social Functional Ability Promotion: autonomy promoted     Problem: Excessive Substance Use  Goal: Optimized Energy Level (Excessive Substance Use)  Outcome: Progressing  Flowsheets (Taken 4/4/2025 0901)  Mutually Determined Action Steps (Optimized Energy Level): grooms self without prompting  Intervention: Optimize Energy Level  Flowsheets (Taken 4/4/2025 0901)  Activity (Behavioral Health): up ad duncan  Patient Performed Hygiene: teeth brushed  Diversional Activity: television  Goal: Improved Behavioral Control (Excessive Substance Use)  Outcome: Progressing  Flowsheets (Taken 4/4/2025 0901)  Mutually Determined Action Steps (Improved Behavioral Control): identifies major stressors  Intervention: Promote Behavior and Impulse Control  Flowsheets (Taken 4/4/2025 0901)  Behavior Management: behavioral plan reviewed  Goal:  Increased Participation and Engagement (Excessive Substance Use)  Outcome: Progressing  Flowsheets (Taken 4/4/2025 0901)  Mutually Determined Action Steps (Increased Participation and Engagement): discusses ongoing recovery plan  Intervention: Facilitate Participation and Engagement  Flowsheets (Taken 4/4/2025 0901)  Supportive Measures: self-care encouraged  Diversional Activity: television  Goal: Improved Physiologic Symptoms (Excessive Substance Use)  Outcome: Progressing  Flowsheets (Taken 4/4/2025 0901)  Mutually Determined Action Steps (Improved Physiologic Symptoms): discusses use pattern  Intervention: Optimize Physiologic Function  Flowsheets (Taken 4/4/2025 0901)  Oral Nutrition Promotion: rest periods promoted  Nutrition Interventions: food preferences provided  Goal: Enhanced Social, Occupational or Functional Skills (Excessive Substance Use)  Outcome: Progressing  Flowsheets (Taken 4/4/2025 0901)  Mutually Determined Action Steps (Enhanced Social, Occupational or Functional Skills): participates in social skills training  Intervention: Promote Social, Occupational and Functional Ability  Flowsheets (Taken 4/4/2025 0901)  Trust Relationship/Rapport: care explained  Social Functional Ability Promotion: autonomy promoted    He is observed to be pacing in the terrell and RTIS AEB talking to himself. He is wearing earplugs to block out the voices. Fair eye contact noted. Speech low and slowed. Guarded. Suspicious. Denies SI and HI. Minimal interactions noted with other patients and staff. He is AAO X 4. Continue plan of care and provide a safe and therapeutic environment. Continue to monitor every fifteen minutes for safety.

## 2025-04-04 NOTE — PLAN OF CARE
Problem: Adult Behavioral Health Plan of Care  Goal: Plan of Care Review  Outcome: Progressing  Flowsheets (Taken 4/3/2025 2344)  Patient Agreement with Plan of Care: agrees  Plan of Care Reviewed With: patient  Goal: Patient-Specific Goal (Individualization)  Outcome: Progressing  Flowsheets (Taken 4/3/2025 2344)  Patient Personal Strengths: independent living skills  Patient Vulnerabilities: substance abuse/addiction  Goal: Adheres to Safety Considerations for Self and Others  Outcome: Progressing  Flowsheets (Taken 4/3/2025 2344)  Adheres to Safety Considerations for Self and Others: making progress toward outcome  Intervention: Develop and Maintain Individualized Safety Plan  Flowsheets (Taken 4/3/2025 2344)  Safety Measures: safety rounds completed  Goal: Absence of New-Onset Illness or Injury  Outcome: Progressing  Intervention: Identify and Manage Fall Risk  Flowsheets (Taken 4/3/2025 2344)  Safety Measures: safety rounds completed  Intervention: Prevent Skin Injury  Flowsheets (Taken 4/3/2025 2344)  Device Skin Pressure Protection: adhesive use limited  Intervention: Prevent VTE (Venous Thromboembolism)  Flowsheets (Taken 4/3/2025 2344)  VTE Prevention/Management: fluids promoted  Intervention: Prevent Infection  Flowsheets (Taken 4/3/2025 2344)  Infection Prevention: hand hygiene promoted  Goal: Optimized Coping Skills in Response to Life Stressors  Outcome: Progressing  Flowsheets (Taken 4/3/2025 2344)  Optimized Coping Skills in Response to Life Stressors: making progress toward outcome  Intervention: Promote Effective Coping Strategies  Flowsheets (Taken 4/3/2025 2344)  Supportive Measures:   self-care encouraged   active listening utilized  Goal: Develops/Participates in Therapeutic [place] to Support Successful Transition  Outcome: Progressing  Flowsheets (Taken 4/3/2025 2344)  Develops/Participates in Therapeutic [place] to Support Successful Transition: making progress toward  outcome  Intervention: Foster Therapeutic [place]  Flowsheets (Taken 4/3/2025 2344)  Trust Relationship/Rapport: care explained  Intervention: Mutually Develop Transition Plan  Flowsheets (Taken 4/3/2025 2344)  Transition Support: follow-up care discussed  Goal: Rounds/Family Conference  Outcome: Progressing     Problem: Psychotic Signs/Symptoms  Goal: Improved Behavioral Control (Psychotic Signs/Symptoms)  Outcome: Progressing  Flowsheets (Taken 4/3/2025 2344)  Mutually Determined Action Steps (Improved Behavioral Control): identifies symptoms triggers  Intervention: Manage Behavior  Flowsheets (Taken 4/3/2025 2344)  De-Escalation Techniques:   appropriate behavior reinforced   quiet time facilitated  Goal: Optimal Cognitive Function (Psychotic Signs/Symptoms)  Outcome: Progressing  Flowsheets (Taken 4/3/2025 2344)  Mutually Determined Action Steps (Optimal Cognitive Function): participates in attention training  Intervention: Support and Promote Cognitive Ability  Flowsheets (Taken 4/3/2025 2344)  Trust Relationship/Rapport: care explained  Goal: Increased Participation and Engagement (Psychotic Signs/Symptoms)  Outcome: Progressing  Flowsheets (Taken 4/3/2025 2344)  Mutually Determined Action Steps (Increased Participation and Engagement): identifies symptoms triggers  Intervention: Facilitate Participation and Engagement  Flowsheets (Taken 4/3/2025 2344)  Supportive Measures:   self-care encouraged   active listening utilized  Diversional Activity: television  Goal: Improved Mood Symptoms (Psychotic Signs/Symptoms)  Outcome: Progressing  Flowsheets (Taken 4/3/2025 2344)  Mutually Determined Action Steps (Improved Mood Symptoms): acknowledges progress  Intervention: Optimize Emotion and Mood  Flowsheets (Taken 4/3/2025 2344)  Supportive Measures:   self-care encouraged   active listening utilized  Diversional Activity: television  Goal: Improved Psychomotor Symptoms (Psychotic Signs/Symptoms)  Outcome:  Progressing  Flowsheets (Taken 4/3/2025 2344)  Mutually Determined Action Steps (Improved Psychomotor Symptoms): adheres to medication regimen  Intervention: Manage Psychomotor Movement  Flowsheets (Taken 4/3/2025 2344)  Activity (Behavioral Health): up ad [place]can  Diversional Activity: television  Goal: Decreased Sensory Symptoms (Psychotic Signs/Symptoms)  Outcome: Progressing  Flowsheets (Taken 4/3/2025 2344)  Mutually Determined Action Steps (Decreased Sensory Symptoms): shares insight re: need for meds  Intervention: Minimize and Manage Sensory Impairment  Flowsheets (Taken 4/3/2025 2344)  Sensory Stimulation Regulation: quiet environment promoted  Goal: Improved Sleep (Psychotic Signs/Symptoms)  Outcome: Progressing  Flowsheets (Taken 4/3/2025 2344)  Mutually Determined Action Steps (Improved Sleep): sleeps 4-6 hours at night  Intervention: Promote Healthy Sleep Hygiene  Flowsheets (Taken 4/3/2025 2344)  Sleep Hygiene Promotion:   regular sleep pattern promoted   awakenings minimized  Goal: Enhanced Social, Occupational or Functional Skills (Psychotic Signs/Symptoms)  Outcome: Progressing  Flowsheets (Taken 4/3/2025 2344)  Mutually Determined Action Steps (Enhanced Social, Occupational or Functional Skills): participates in social skills training  Intervention: Promote Social, Occupational and Functional Ability  Flowsheets (Taken 4/3/2025 2344)  Trust Relationship/Rapport: care explained  Social Functional Ability Promotion: autonomy promoted     Problem: Excessive Substance Use  Goal: Optimized Energy Level (Excessive Substance Use)  Outcome: Progressing  Flowsheets (Taken 4/3/2025 2344)  Mutually Determined Action Steps (Optimized Energy Level): grooms self without prompting  Intervention: Optimize Energy Level  Flowsheets (Taken 4/3/2025 2344)  Activity (Behavioral Health): up ad [place]  Diversional Activity: television  Goal: Improved Behavioral Control (Excessive Substance Use)  Outcome: Progressing  Flowsheets  (Taken 4/3/2025 2344)  Mutually Determined Action Steps (Improved Behavioral Control): identifies major stressors  Intervention: Promote Behavior and Impulse Control  Flowsheets (Taken 4/3/2025 2344)  Behavior Management: behavioral plan reviewed  Goal: Increased Participation and Engagement (Excessive Substance Use)  Outcome: Progressing  Flowsheets (Taken 4/3/2025 2344)  Mutually Determined Action Steps (Increased Participation and Engagement): discusses ongoing recovery plan  Intervention: Facilitate Participation and Engagement  Flowsheets (Taken 4/3/2025 2344)  Supportive Measures:   self-care encouraged   active listening utilized  Diversional Activity: television  Goal: Improved Physiologic Symptoms (Excessive Substance Use)  Outcome: Progressing  Flowsheets (Taken 4/3/2025 2344)  Mutually Determined Action Steps (Improved Physiologic Symptoms): discusses use pattern  Intervention: Optimize Physiologic Function  Flowsheets (Taken 4/3/2025 2344)  Oral Nutrition Promotion: rest periods promoted  Nutrition Interventions: food preferences provided  Goal: Enhanced Social, Occupational or Functional Skills (Excessive Substance Use)  Outcome: Progressing  Flowsheets (Taken 4/3/2025 2344)  Mutually Determined Action Steps (Enhanced Social, Occupational or Functional Skills): participates in social skills training  Intervention: Promote Social, Occupational and Functional Ability  Flowsheets (Taken 4/3/2025 2344)  Trust Relationship/Rapport: care explained  Social Functional Ability Promotion: autonomy promoted  AAOx3. Paranoid. Denies hallucinations but is seen talking to self and answering self. Restless. Pacing hallway. Not participating in groups. Minimally interacting with peers and staff. Compliant with meds. Was placed as a no roommate today due to paranoia with talking to self, restlessness, pacing room last night and disrupting roommate. Continue plan of care and provide a safe and therapeutic environment.  Continue to monitor every fifteen minutes for safety.

## 2025-04-04 NOTE — PROGRESS NOTES
4/4/2025  Oscar Canseco   1990   [id number]        Psychiatry Progress Note     Chief Complaint: Fine    SUBJECTIVE:   Oscar Canseco is a 34 y.o. male with a known history of bipolar disorder with manic episodes, placed under a Physicians Emergency Certificate (PEC) at [facility], after presenting to [email or web address] ED on 4/2/2025 with his mother during what was described as an active manic episode. The patient admitted to poor adherence to his psychiatric medications, then diverted the conversation to discussing nicotine use, showing flight of ideas and distractibility. He was noted to be pacing and clenching his fists, though he denied suicidal ideation. When asked directly about homicidal ideation, he refused to answer, and although he did not make direct threats, his mother reported that if there was anyone he may target, it could be his father. The patient did not confirm or deny this when questioned. Although he denies auditory hallucinations, his behavioral cues--including distracted responses and unusual movements--suggest the possibility of responding to internal stimuli.     Patient presents today calm and cooperative but slightly irritable. Patient states that he would like some klonopin while he's here. He does have a previous history of Klonopin prescription but he endorsed to me prior to this that he does not take it daily. He endorsed taking it two to three times a week. His UDS was negative for benzodiazepines. Patient also shows no signs of distress or anxiety at this time. Will continue with current regimen and monitor for need to augment.        Current Medications:   Scheduled Meds:    nicotine  1 patch Transdermal Daily    OLANZapine  10 mg Oral QHS      PRN Meds:   Current Facility-Administered Medications:     acetaminophen, 650 mg, Oral, Q6H PRN    aluminum-magnesium hydroxide-simethicone, 30 mL, Oral, Q6H PRN    haloperidoL, 5 mg, Oral, Q6H PRN **AND** diphenhydrAMINE, 50 mg, Oral, Q6H PRN  "**AND** LORazepam, 2 mg, Oral, Q6H PRN **AND** haloperidol lactate, 5 mg, Intramuscular, Q6H PRN **AND** diphenhydrAMINE, 50 mg, Intramuscular, Q6H PRN **AND** lorazepam, 2 mg, Intramuscular, Q6H PRN    hydrOXYzine HCL, 50 mg, Oral, Q4H PRN    traZODone, 100 mg, Oral, Nightly PRN   Psychotherapeutics (From admission, onward)      Start     Stop Route Frequency Ordered    04/03/25 2100  OLANZapine tablet 10 mg         -- Oral Nightly 04/03/25 0933    04/02/25 2048  traZODone tablet 100 mg         -- Oral Nightly PRN 04/02/25 2048 04/02/25 2048  haloperidoL tablet 5 mg  (Med - Acute  Behavioral Management)        Placed in "And" Linked Group    -- Oral Every 6 hours PRN 04/02/25 2048 04/02/25 2048  LORazepam tablet 2 mg  (Med - Acute  Behavioral Management)        Placed in "And" Linked Group    -- Oral Every 6 hours PRN 04/02/25 2048 04/02/25 2048  haloperidol lactate injection 5 mg  (Med - Acute  Behavioral Management)        Placed in "And" Linked Group    -- IM Every 6 hours PRN 04/02/25 2048 04/02/25 2048  LORazepam injection 2 mg  (Med - Acute  Behavioral Management)        Placed in "And" Linked Group    -- IM Every 6 hours PRN 04/02/25 2048            Allergies:   Review of patient's allergies indicates:  No Known Allergies     OBJECTIVE:   Vitals   Vitals:    04/04/25 0701   BP: 135/83   Pulse: (!) 56   Resp: 18   Temp: 97.7 °F (36.5 °C)        Labs/Imaging/Studies:   No results found for this or any previous visit (from the past 36 hours).       Medical Review Of Systems:  A comprehensive review of systems was negative.      Psychiatric Mental Status Exam:  General Appearance: appears stated age, well developed and nourished, adequately groomed and appropriately dressed, in no acute distress  Arousal: alert  Behavior: normal; cooperative; reasonably friendly, pleasant, and polite; appropriate eye-contact; under good behavioral control  Movements and Motor Activity: no abnormal involuntary " movements noted; no tics, no tremors, no akathisia, no dystonia, no evidence of tardive dyskinesia; no psychomotor agitation or retardation  Orientation: oriented to person, place, time, and situation  Speech: intact; normal rate, rhythm, volume, tone and pitch; conversational, spontaneous, and coherent  Mood: Dysphoric and Guarded  Affect: constricted  Thought Process: intact, linear, goal-directed, organized, logical  Associations: intact, no loosening of associations  Thought Content and Perceptions: no suicidal or homicidal ideation, no auditory or visual hallucinations, no paranoid ideation, no ideas of reference, no evidence of delusions or psychosis  Recent and Remote Memory: grossly intact, able to recall relevant and salient information from the recent and remote past; per interview/observation with patient  Attention and Concentration: grossly intact, attentive to the conversation and not readily distractible; per interview/observation with patient  Fund of Knowledge: grossly intact, used appropriate vocabulary and demonstrated an awareness of current events; based on history, vocabulary, fund of knowledge, syntax, grammar, and content  Insight: intact; based on understanding of severity of illness and HPI  Judgment: questionable; based on patient's behavior and HPI    ASSESSMENT/PLAN:   Problems Addressed/Diagnoses:  Bipolar I Disorder, Most Recent Episode Manic: 6.7.4.Severe With Psychotic Features (F31.2)     Past Medical History:   Diagnosis Date    Bipolar disorder     History of psychiatric hospitalization     Hx of psychiatric care     Therapy         Plan:  Bipolar disorder with psychosis  -Zyprexa Zydis 10 mg QHS    Expected Disposition Plan: Home        [facility]

## 2025-04-04 NOTE — GROUP NOTE
Group Psychotherapy       Group Focus: Stress Management  Group topic: Coping Skills: Therapist explored patients need for increase in effective coping skills to deal with stress or anxiety.         Number of patients in attendance: 12  Group Start Time: 1045  Group End Time:  1130  Groups Date: 4/4/2025  Group Topic:  Behavioral Health  Group Department: [facility] Behavioral Health Unit  Group Facilitators:  Kary Joshua MSW  _____________________________________________________________________    Patient Name: Oscar Canseco  MRN: [id number]  Patient Class: IP- Psych   Admission Date\Time: 4/2/2025  7:58 PM  Hospital Length of Stay: 5  Primary Care Provider: Edwina Jung NP (Inactive)     Referred by: Acute Psychiatry Unit Treatment Team     Target symptoms: Psychosis and Poor Coping Skills     Patient's response to treatment: Active Listening and Self-disclosure     Progress toward goals: Progressing slowly     Plan: Continue treatment on APU

## 2025-04-04 NOTE — PROGRESS NOTES
04/04/25 1400   [facility] Group Therapy   Group Name Therapeutic Recreation   Specific Interventions Skilled Activity Leisure Education and Awareness   Participation Level Active;Supportive;Appropriate;Attentive   Participation Quality Cooperative;Withdrawn   Insight/Motivation Limited   Affect/Mood Display Restless   Cognition Oriented;Alert;Pre-Occupied   Psychomotor WNL

## 2025-04-05 PROCEDURE — 25000003 PHARM REV CODE 250: Performed by: PSYCHIATRY & NEUROLOGY

## 2025-04-05 PROCEDURE — 25000003 PHARM REV CODE 250

## 2025-04-05 PROCEDURE — S4991 NICOTINE PATCH NONLEGEND: HCPCS | Performed by: PSYCHIATRY & NEUROLOGY

## 2025-04-05 PROCEDURE — 11400000 HC PSYCH PRIVATE ROOM

## 2025-04-05 RX ADMIN — OLANZAPINE 10 MG: 5 TABLET, FILM COATED ORAL at 08:04

## 2025-04-05 RX ADMIN — NICOTINE 1 PATCH: 21 PATCH, EXTENDED RELEASE TRANSDERMAL at 09:04

## 2025-04-05 NOTE — PLAN OF CARE
Problem: Adult Behavioral Health Plan of Care  Goal: Plan of Care Review  Outcome: Progressing  Flowsheets (Taken 4/5/2025 0359)  Patient Agreement with Plan of Care: agrees  Plan of Care Reviewed With: patient  Goal: Patient-Specific Goal (Individualization)  Outcome: Progressing  Flowsheets (Taken 4/5/2025 0359)  Patient Personal Strengths: independent living skills  Patient Vulnerabilities: substance abuse/addiction  Goal: Adheres to Safety Considerations for Self and Others  Outcome: Progressing  Flowsheets (Taken 4/5/2025 0359)  Adheres to Safety Considerations for Self and Others: making progress toward outcome  Intervention: Develop and Maintain Individualized Safety Plan  Flowsheets (Taken 4/5/2025 0359)  Safety Measures: safety rounds completed  Goal: Absence of New-Onset Illness or Injury  Outcome: Progressing  Intervention: Identify and Manage Fall Risk  Flowsheets (Taken 4/5/2025 0359)  Safety Measures: safety rounds completed  Intervention: Prevent Skin Injury  Flowsheets (Taken 4/5/2025 0359)  Device Skin Pressure Protection: adhesive use limited  Intervention: Prevent VTE (Venous Thromboembolism)  Flowsheets (Taken 4/5/2025 0359)  VTE Prevention/Management: fluids promoted  Intervention: Prevent Infection  Flowsheets (Taken 4/5/2025 0359)  Infection Prevention: rest/sleep promoted  Goal: Optimized Coping Skills in Response to Life Stressors  Outcome: Progressing  Flowsheets (Taken 4/5/2025 0359)  Optimized Coping Skills in Response to Life Stressors: making progress toward outcome  Intervention: Promote Effective Coping Strategies  Flowsheets (Taken 4/5/2025 0359)  Supportive Measures: self-care encouraged  Goal: Develops/Participates in Therapeutic [place] to Support Successful Transition  Outcome: Progressing  Flowsheets (Taken 4/5/2025 0359)  Develops/Participates in Therapeutic [place] to Support Successful Transition: making progress toward outcome  Intervention: Foster Therapeutic  [place]  Flowsheets (Taken 4/5/2025 0359)  Trust Relationship/Rapport: care explained  Intervention: Mutually Develop Transition Plan  Flowsheets (Taken 4/5/2025 0359)  Transition Support: follow-up care discussed  Goal: Rounds/Family Conference  Outcome: Progressing  Flowsheets (Taken 4/5/2025 0359)  Participants: milieu/psych techs     Problem: Psychotic Signs/Symptoms  Goal: Improved Behavioral Control (Psychotic Signs/Symptoms)  Outcome: Progressing  Flowsheets (Taken 4/5/2025 0359)  Mutually Determined Action Steps (Improved Behavioral Control): identifies symptoms triggers  Intervention: Manage Behavior  Flowsheets (Taken 4/5/2025 0359)  De-Escalation Techniques: quiet time facilitated  Goal: Optimal Cognitive Function (Psychotic Signs/Symptoms)  Outcome: Progressing  Flowsheets (Taken 4/5/2025 0359)  Mutually Determined Action Steps (Optimal Cognitive Function): participates in attention training  Intervention: Support and Promote Cognitive Ability  Flowsheets (Taken 4/5/2025 0359)  Trust Relationship/Rapport: care explained  Goal: Increased Participation and Engagement (Psychotic Signs/Symptoms)  Outcome: Progressing  Flowsheets (Taken 4/5/2025 0359)  Mutually Determined Action Steps (Increased Participation and Engagement): identifies symptoms triggers  Intervention: Facilitate Participation and Engagement  Flowsheets (Taken 4/5/2025 0359)  Supportive Measures: self-care encouraged  Diversional Activity: television  Goal: Improved Mood Symptoms (Psychotic Signs/Symptoms)  Outcome: Progressing  Flowsheets (Taken 4/5/2025 0359)  Mutually Determined Action Steps (Improved Mood Symptoms): acknowledges progress  Intervention: Optimize Emotion and Mood  Flowsheets (Taken 4/5/2025 0359)  Supportive Measures: self-care encouraged  Diversional Activity: television  Goal: Improved Psychomotor Symptoms (Psychotic Signs/Symptoms)  Outcome: Progressing  Flowsheets (Taken 4/5/2025 0359)  Mutually Determined Action Steps  (Improved Psychomotor Symptoms): adheres to medication regimen  Intervention: Manage Psychomotor Movement  Flowsheets (Taken 4/5/2025 0359)  Activity (Behavioral Health): up ad [place]  Diversional Activity: television  Goal: Decreased Sensory Symptoms (Psychotic Signs/Symptoms)  Outcome: Progressing  Flowsheets (Taken 4/5/2025 0359)  Mutually Determined Action Steps (Decreased Sensory Symptoms): adheres to medication regimen  Intervention: Minimize and Manage Sensory Impairment  Flowsheets (Taken 4/5/2025 0359)  Sensory Stimulation Regulation: quiet environment promoted  Goal: Improved Sleep (Psychotic Signs/Symptoms)  Outcome: Progressing  Flowsheets (Taken 4/5/2025 0359)  Mutually Determined Action Steps (Improved Sleep): sleeps 4-6 hours at night  Intervention: Promote Healthy Sleep Hygiene  Flowsheets (Taken 4/5/2025 0359)  Sleep Hygiene Promotion: regular sleep pattern promoted  Goal: Enhanced Social, Occupational or Functional Skills (Psychotic Signs/Symptoms)  Outcome: Progressing  Flowsheets (Taken 4/5/2025 0359)  Mutually Determined Action Steps (Enhanced Social, Occupational or Functional Skills): participates in social skills training  Intervention: Promote Social, Occupational and Functional Ability  Flowsheets (Taken 4/5/2025 0359)  Trust Relationship/Rapport: care explained  Social Functional Ability Promotion: autonomy promoted     Problem: Excessive Substance Use  Goal: Optimized Energy Level (Excessive Substance Use)  Outcome: Progressing  Flowsheets (Taken 4/5/2025 0359)  Mutually Determined Action Steps (Optimized Energy Level): grooms self without prompting  Intervention: Optimize Energy Level  Flowsheets (Taken 4/5/2025 0359)  Activity (Behavioral Health): up ad [place]  Diversional Activity: television  Goal: Improved Behavioral Control (Excessive Substance Use)  Outcome: Progressing  Flowsheets (Taken 4/5/2025 0359)  Mutually Determined Action Steps (Improved Behavioral Control): identifies major  stressors  Intervention: Promote Behavior and Impulse Control  Flowsheets (Taken 4/5/2025 0359)  Behavior Management: behavioral plan reviewed  Goal: Increased Participation and Engagement (Excessive Substance Use)  Outcome: Progressing  Flowsheets (Taken 4/5/2025 0359)  Mutually Determined Action Steps (Increased Participation and Engagement): discusses ongoing recovery plan  Intervention: Facilitate Participation and Engagement  Flowsheets (Taken 4/5/2025 0359)  Supportive Measures: self-care encouraged  Diversional Activity: television  Goal: Improved Physiologic Symptoms (Excessive Substance Use)  Outcome: Progressing  Flowsheets (Taken 4/5/2025 0359)  Mutually Determined Action Steps (Improved Physiologic Symptoms): discusses use pattern  Intervention: Optimize Physiologic Function  Flowsheets (Taken 4/5/2025 0359)  Oral Nutrition Promotion: rest periods promoted  Nutrition Interventions: food preferences provided  Goal: Enhanced Social, Occupational or Functional Skills (Excessive Substance Use)  Outcome: Progressing  Flowsheets (Taken 4/5/2025 0359)  Mutually Determined Action Steps (Enhanced Social, Occupational or Functional Skills): participates in social skills training  Intervention: Promote Social, Occupational and Functional Ability  Flowsheets (Taken 4/5/2025 0359)  Trust Relationship/Rapport: care explained  Social Functional Ability Promotion: autonomy promoted   AAO X 3. Flat affect and anxious mood. Guarded. Suspicious. Minimal interactions noted with other patients and staff. Good eye contact noted. Speech normal tone and speed. Continue plan of care and provide a safe and therapeutic environment. Every fifteen minute monitoring continued for safety.

## 2025-04-05 NOTE — PROGRESS NOTES
04/05/25 1400   [facility] Group Therapy   Group Name Mental Awareness   Specific Interventions Relapse Prevention   Participation Level Active   Participation Quality Cooperative   Insight/Motivation Improved   Affect/Mood Display Anxious   Cognition Alert   Psychomotor WNL

## 2025-04-05 NOTE — PLAN OF CARE
Problem: Adult Behavioral Health Plan of Care  Goal: Plan of Care Review  Outcome: Progressing  Flowsheets (Taken 4/5/2025 0925)  Patient Agreement with Plan of Care: agrees  Plan of Care Reviewed With: patient  Goal: Patient-Specific Goal (Individualization)  Outcome: Progressing  Flowsheets (Taken 4/5/2025 0925)  Patient Personal Strengths: independent living skills  Patient Vulnerabilities: substance abuse/addiction  Goal: Adheres to Safety Considerations for Self and Others  Outcome: Progressing  Flowsheets (Taken 4/5/2025 0925)  Adheres to Safety Considerations for Self and Others: making progress toward outcome  Intervention: Develop and Maintain Individualized Safety Plan  Flowsheets (Taken 4/5/2025 0925)  Safety Measures: safety rounds completed  Goal: Absence of New-Onset Illness or Injury  Outcome: Progressing  Intervention: Identify and Manage Fall Risk  Flowsheets (Taken 4/5/2025 0925)  Safety Measures: safety rounds completed  Intervention: Prevent VTE (Venous Thromboembolism)  Flowsheets (Taken 4/5/2025 0925)  VTE Prevention/Management: fluids promoted  Intervention: Prevent Infection  Flowsheets (Taken 4/5/2025 0925)  Infection Prevention: rest/sleep promoted  Goal: Optimized Coping Skills in Response to Life Stressors  Outcome: Progressing  Flowsheets (Taken 4/5/2025 0925)  Optimized Coping Skills in Response to Life Stressors: making progress toward outcome  Intervention: Promote Effective Coping Strategies  Flowsheets (Taken 4/5/2025 0925)  Supportive Measures: self-care encouraged  Goal: Develops/Participates in Therapeutic [place] to Support Successful Transition  Outcome: Progressing  Flowsheets (Taken 4/5/2025 0925)  Develops/Participates in Therapeutic [place] to Support Successful Transition: making progress toward outcome  Intervention: Foster Therapeutic [place]  Flowsheets (Taken 4/5/2025 0925)  Trust Relationship/Rapport: care explained  Goal: Rounds/Family Conference  Outcome:  Progressing  Flowsheets (Taken 4/5/2025 0925)  Participants:   milieu/psych techs   nursing     Problem: Psychotic Signs/Symptoms  Goal: Improved Behavioral Control (Psychotic Signs/Symptoms)  Outcome: Progressing  Flowsheets (Taken 4/5/2025 0925)  Mutually Determined Action Steps (Improved Behavioral Control): identifies symptoms triggers  Intervention: Manage Behavior  Flowsheets (Taken 4/5/2025 0925)  De-Escalation Techniques: quiet time facilitated  Goal: Optimal Cognitive Function (Psychotic Signs/Symptoms)  Outcome: Progressing  Flowsheets (Taken 4/5/2025 0925)  Mutually Determined Action Steps (Optimal Cognitive Function): participates in attention training  Intervention: Support and Promote Cognitive Ability  Flowsheets (Taken 4/5/2025 0925)  Trust Relationship/Rapport: care explained  Goal: Increased Participation and Engagement (Psychotic Signs/Symptoms)  Outcome: Progressing  Flowsheets (Taken 4/5/2025 0925)  Mutually Determined Action Steps (Increased Participation and Engagement): identifies symptoms triggers  Intervention: Facilitate Participation and Engagement  Flowsheets (Taken 4/5/2025 0925)  Supportive Measures: self-care encouraged  Diversional Activity: television  Goal: Improved Mood Symptoms (Psychotic Signs/Symptoms)  Outcome: Progressing  Flowsheets (Taken 4/5/2025 0925)  Mutually Determined Action Steps (Improved Mood Symptoms): acknowledges progress  Intervention: Optimize Emotion and Mood  Flowsheets (Taken 4/5/2025 0925)  Supportive Measures: self-care encouraged  Diversional Activity: television  Goal: Improved Psychomotor Symptoms (Psychotic Signs/Symptoms)  Outcome: Progressing  Flowsheets (Taken 4/5/2025 0925)  Mutually Determined Action Steps (Improved Psychomotor Symptoms): adheres to medication regimen  Intervention: Manage Psychomotor Movement  Flowsheets (Taken 4/5/2025 0925)  Activity (Behavioral Health): up ad duncan  Patient Performed Hygiene: teeth brushed  Diversional  Activity: television  Goal: Decreased Sensory Symptoms (Psychotic Signs/Symptoms)  Outcome: Progressing  Flowsheets (Taken 4/5/2025 0925)  Mutually Determined Action Steps (Decreased Sensory Symptoms): adheres to medication regimen  Intervention: Minimize and Manage Sensory Impairment  Flowsheets (Taken 4/5/2025 0925)  Sensory Stimulation Regulation: quiet environment promoted  Goal: Improved Sleep (Psychotic Signs/Symptoms)  Outcome: Progressing  Flowsheets (Taken 4/5/2025 0925)  Mutually Determined Action Steps (Improved Sleep): sleeps 4-6 hours at night  Intervention: Promote Healthy Sleep Hygiene  Flowsheets (Taken 4/5/2025 0925)  Sleep Hygiene Promotion: regular sleep pattern promoted  Goal: Enhanced Social, Occupational or Functional Skills (Psychotic Signs/Symptoms)  Outcome: Progressing  Flowsheets (Taken 4/5/2025 0925)  Mutually Determined Action Steps (Enhanced Social, Occupational or Functional Skills): participates in social skills training  Intervention: Promote Social, Occupational and Functional Ability  Flowsheets (Taken 4/5/2025 0925)  Trust Relationship/Rapport: care explained  Social Functional Ability Promotion: autonomy promoted     Problem: Excessive Substance Use  Goal: Optimized Energy Level (Excessive Substance Use)  Outcome: Progressing  Flowsheets (Taken 4/5/2025 0925)  Mutually Determined Action Steps (Optimized Energy Level): grooms self without prompting  Intervention: Optimize Energy Level  Flowsheets (Taken 4/5/2025 0925)  Activity (Behavioral Health): up ad duncan  Patient Performed Hygiene: teeth brushed  Diversional Activity: television  Goal: Improved Behavioral Control (Excessive Substance Use)  Outcome: Progressing  Flowsheets (Taken 4/5/2025 0925)  Mutually Determined Action Steps (Improved Behavioral Control): identifies major stressors  Intervention: Promote Behavior and Impulse Control  Flowsheets (Taken 4/5/2025 0925)  Behavior Management: behavioral plan reviewed  Goal:  Increased Participation and Engagement (Excessive Substance Use)  Outcome: Progressing  Flowsheets (Taken 4/5/2025 0925)  Mutually Determined Action Steps (Increased Participation and Engagement): discusses ongoing recovery plan  Intervention: Facilitate Participation and Engagement  Flowsheets (Taken 4/5/2025 0925)  Supportive Measures: self-care encouraged  Diversional Activity: television  Goal: Improved Physiologic Symptoms (Excessive Substance Use)  Outcome: Progressing  Flowsheets (Taken 4/5/2025 0925)  Mutually Determined Action Steps (Improved Physiologic Symptoms): discusses use pattern  Intervention: Optimize Physiologic Function  Flowsheets (Taken 4/5/2025 0925)  Oral Nutrition Promotion: rest periods promoted  Nutrition Interventions: food preferences provided  Goal: Enhanced Social, Occupational or Functional Skills (Excessive Substance Use)  Outcome: Progressing  Flowsheets (Taken 4/5/2025 0925)  Mutually Determined Action Steps (Enhanced Social, Occupational or Functional Skills): participates in social skills training  Intervention: Promote Social, Occupational and Functional Ability  Flowsheets (Taken 4/5/2025 0925)  Trust Relationship/Rapport: care explained  Social Functional Ability Promotion: autonomy promoted    Guarded. Suspicious. He is observed to be pacing in the terrell talking to someone who isn't present. Minimal self disclosure offered at present time. Minimal interactions noted with other patients and staff. Fair eye contact noted. Speech normal tone and speed. Continue plan of care and provide a safe and therapeutic environment. Continue to monitor every fifteen minutes for safety.

## 2025-04-06 PROCEDURE — 25000003 PHARM REV CODE 250

## 2025-04-06 PROCEDURE — 25000003 PHARM REV CODE 250: Performed by: PSYCHIATRY & NEUROLOGY

## 2025-04-06 PROCEDURE — 11400000 HC PSYCH PRIVATE ROOM

## 2025-04-06 PROCEDURE — S4991 NICOTINE PATCH NONLEGEND: HCPCS | Performed by: PSYCHIATRY & NEUROLOGY

## 2025-04-06 RX ADMIN — ACETAMINOPHEN 650 MG: 325 TABLET, FILM COATED ORAL at 06:04

## 2025-04-06 RX ADMIN — OLANZAPINE 10 MG: 5 TABLET, FILM COATED ORAL at 08:04

## 2025-04-06 RX ADMIN — NICOTINE 1 PATCH: 21 PATCH, EXTENDED RELEASE TRANSDERMAL at 08:04

## 2025-04-06 NOTE — PLAN OF CARE
Problem: Adult Behavioral Health Plan of Care  Goal: Plan of Care Review  Outcome: Progressing  Flowsheets (Taken 4/5/2025 2142)  Patient Agreement with Plan of Care: agrees  Plan of Care Reviewed With: patient  Goal: Patient-Specific Goal (Individualization)  Outcome: Progressing  Flowsheets (Taken 4/5/2025 2142)  Patient Personal Strengths: independent living skills  Patient Vulnerabilities: substance abuse/addiction  Goal: Adheres to Safety Considerations for Self and Others  Outcome: Progressing  Flowsheets (Taken 4/5/2025 2142)  Adheres to Safety Considerations for Self and Others: making progress toward outcome  Intervention: Develop and Maintain Individualized Safety Plan  Flowsheets (Taken 4/5/2025 2142)  Safety Measures: safety rounds completed  Goal: Absence of New-Onset Illness or Injury  Outcome: Progressing  Intervention: Identify and Manage Fall Risk  Flowsheets (Taken 4/5/2025 2142)  Safety Measures: safety rounds completed  Intervention: Prevent Skin Injury  Flowsheets (Taken 4/5/2025 2142)  Device Skin Pressure Protection: adhesive use limited  Intervention: Prevent VTE (Venous Thromboembolism)  Flowsheets (Taken 4/5/2025 2142)  VTE Prevention/Management: fluids promoted  Intervention: Prevent Infection  Flowsheets (Taken 4/5/2025 2142)  Infection Prevention: rest/sleep promoted  Goal: Optimized Coping Skills in Response to Life Stressors  Outcome: Progressing  Flowsheets (Taken 4/5/2025 2142)  Optimized Coping Skills in Response to Life Stressors: making progress toward outcome  Intervention: Promote Effective Coping Strategies  Flowsheets (Taken 4/5/2025 2142)  Supportive Measures: self-care encouraged  Goal: Develops/Participates in Therapeutic [place] to Support Successful Transition  Outcome: Progressing  Flowsheets (Taken 4/5/2025 2142)  Develops/Participates in Therapeutic [place] to Support Successful Transition: making progress toward outcome  Intervention: Foster Therapeutic  [place]  Flowsheets (Taken 4/5/2025 2142)  Trust Relationship/Rapport: care explained  Intervention: Mutually Develop Transition Plan  Flowsheets (Taken 4/5/2025 2142)  Transition Support: follow-up care discussed  Goal: Rounds/Family Conference  Outcome: Progressing     Problem: Psychotic Signs/Symptoms  Goal: Improved Behavioral Control (Psychotic Signs/Symptoms)  Outcome: Progressing  Intervention: Manage Behavior  Flowsheets (Taken 4/5/2025 2142)  De-Escalation Techniques: quiet time facilitated  Goal: Optimal Cognitive Function (Psychotic Signs/Symptoms)  Outcome: Progressing  Flowsheets (Taken 4/5/2025 2142)  Mutually Determined Action Steps (Optimal Cognitive Function): participates in attention training  Intervention: Support and Promote Cognitive Ability  Flowsheets (Taken 4/5/2025 2142)  Trust Relationship/Rapport: care explained  Goal: Increased Participation and Engagement (Psychotic Signs/Symptoms)  Outcome: Progressing  Flowsheets (Taken 4/5/2025 2142)  Mutually Determined Action Steps (Increased Participation and Engagement): identifies symptoms triggers  Intervention: Facilitate Participation and Engagement  Flowsheets (Taken 4/5/2025 2142)  Supportive Measures: self-care encouraged  Diversional Activity: television  Goal: Improved Mood Symptoms (Psychotic Signs/Symptoms)  Outcome: Progressing  Flowsheets (Taken 4/5/2025 2142)  Mutually Determined Action Steps (Improved Mood Symptoms): acknowledges progress  Intervention: Optimize Emotion and Mood  Flowsheets (Taken 4/5/2025 2142)  Supportive Measures: self-care encouraged  Diversional Activity: television  Goal: Improved Psychomotor Symptoms (Psychotic Signs/Symptoms)  Outcome: Progressing  Flowsheets (Taken 4/5/2025 2142)  Mutually Determined Action Steps (Improved Psychomotor Symptoms): exhibits decrease in agitation  Intervention: Manage Psychomotor Movement  Flowsheets (Taken 4/5/2025 2142)  Activity (Behavioral Health): up ad duncan  Diversional  Activity: television  Goal: Decreased Sensory Symptoms (Psychotic Signs/Symptoms)  Outcome: Progressing  Flowsheets (Taken 4/5/2025 2142)  Mutually Determined Action Steps (Decreased Sensory Symptoms): adheres to medication regimen  Intervention: Minimize and Manage Sensory Impairment  Flowsheets (Taken 4/5/2025 2142)  Sensory Stimulation Regulation: quiet environment promoted  Goal: Improved Sleep (Psychotic Signs/Symptoms)  Outcome: Progressing  Flowsheets (Taken 4/5/2025 2142)  Mutually Determined Action Steps (Improved Sleep): sleeps 4-6 hours at night  Intervention: Promote Healthy Sleep Hygiene  Flowsheets (Taken 4/5/2025 2142)  Sleep Hygiene Promotion:   regular sleep pattern promoted   awakenings minimized  Goal: Enhanced Social, Occupational or Functional Skills (Psychotic Signs/Symptoms)  Outcome: Progressing  Flowsheets (Taken 4/5/2025 2142)  Mutually Determined Action Steps (Enhanced Social, Occupational or Functional Skills): participates in social skills training  Intervention: Promote Social, Occupational and Functional Ability  Flowsheets (Taken 4/5/2025 2142)  Trust Relationship/Rapport: care explained  Social Functional Ability Promotion: autonomy promoted     Problem: Excessive Substance Use  Goal: Optimized Energy Level (Excessive Substance Use)  Outcome: Progressing  Flowsheets (Taken 4/5/2025 2142)  Mutually Determined Action Steps (Optimized Energy Level): grooms self without prompting  Intervention: Optimize Energy Level  Flowsheets (Taken 4/5/2025 2142)  Activity (Behavioral Health): up ad duncan  Diversional Activity: television  Goal: Improved Behavioral Control (Excessive Substance Use)  Outcome: Progressing  Flowsheets (Taken 4/5/2025 2142)  Mutually Determined Action Steps (Improved Behavioral Control): identifies major stressors  Intervention: Promote Behavior and Impulse Control  Flowsheets (Taken 4/5/2025 2142)  Behavior Management: behavioral plan reviewed  Goal: Increased  Participation and Engagement (Excessive Substance Use)  Outcome: Progressing  Flowsheets (Taken 4/5/2025 2142)  Mutually Determined Action Steps (Increased Participation and Engagement): discusses ongoing recovery plan  Intervention: Facilitate Participation and Engagement  Flowsheets (Taken 4/5/2025 2142)  Supportive Measures: self-care encouraged  Diversional Activity: television  Goal: Improved Physiologic Symptoms (Excessive Substance Use)  Outcome: Progressing  Flowsheets (Taken 4/5/2025 2142)  Mutually Determined Action Steps (Improved Physiologic Symptoms): discusses use pattern  Intervention: Optimize Physiologic Function  Flowsheets (Taken 4/5/2025 2142)  Oral Nutrition Promotion: rest periods promoted  Nutrition Interventions: food preferences provided  Goal: Enhanced Social, Occupational or Functional Skills (Excessive Substance Use)  Outcome: Progressing  Flowsheets (Taken 4/5/2025 2142)  Mutually Determined Action Steps (Enhanced Social, Occupational or Functional Skills): participates in social skills training  Intervention: Promote Social, Occupational and Functional Ability  Flowsheets (Taken 4/5/2025 2142)  Trust Relationship/Rapport: care explained  Social Functional Ability Promotion: autonomy promoted  AAO X 3. Anxious mood. Guarded. Fair eye contact noted. Denies pain at present. Minimally interacting with peers and staff. Compliant with meds. Required prn meds for anxiety and insomnia. Continue plan of care and provide a safe and therapeutic environment. Every fifteen minute rounding continued for safety.

## 2025-04-06 NOTE — PROGRESS NOTES
04/06/25 0950   [facility], [place] Group Therapy   Group Name Community Reintegration   Specific Interventions Resocialization   Participation Level Active   Participation Quality Cooperative   Insight/Motivation Improved   Affect/Mood Display Appropriate   Cognition Alert   Psychomotor WNL

## 2025-04-06 NOTE — PLAN OF CARE
Problem: Adult Behavioral Health Plan of Care  Goal: Plan of Care Review  4/6/2025 0910 by Oscar Gallegos RN  Outcome: Progressing  Flowsheets (Taken 4/6/2025 0910)  Patient Agreement with Plan of Care: agrees  Plan of Care Reviewed With: patient  4/6/2025 0910 by Oscar Gallegos RN  Outcome: Progressing  Goal: Patient-Specific Goal (Individualization)  4/6/2025 0910 by Oscar Gallegos RN  Outcome: Progressing  Flowsheets (Taken 4/6/2025 0910)  Patient Personal Strengths: independent living skills  Patient Vulnerabilities: substance abuse/addiction  4/6/2025 0910 by Oscar Gallegos RN  Outcome: Progressing  Goal: Adheres to Safety Considerations for Self and Others  4/6/2025 0910 by Oscar Gallegos RN  Outcome: Progressing  Flowsheets (Taken 4/6/2025 0910)  Adheres to Safety Considerations for Self and Others: making progress toward outcome  4/6/2025 0910 by Oscar Gallegos RN  Outcome: Progressing  Intervention: Develop and Maintain Individualized Safety Plan  Flowsheets (Taken 4/6/2025 0910)  Safety Measures: safety rounds completed  Goal: Absence of New-Onset Illness or Injury  4/6/2025 0910 by Oscar Gallegos RN  Outcome: Progressing  4/6/2025 0910 by Oscar Gallegos RN  Outcome: Progressing  Intervention: Identify and Manage Fall Risk  Flowsheets (Taken 4/6/2025 0910)  Safety Measures: safety rounds completed  Intervention: Prevent VTE (Venous Thromboembolism)  Flowsheets (Taken 4/6/2025 0910)  VTE Prevention/Management: fluids promoted  Intervention: Prevent Infection  Flowsheets (Taken 4/6/2025 0910)  Infection Prevention: rest/sleep promoted  Goal: Optimized Coping Skills in Response to Life Stressors  4/6/2025 0910 by Oscar Gallegos RN  Outcome: Progressing  Flowsheets (Taken 4/6/2025 0910)  Optimized Coping Skills in Response to Life Stressors: making progress toward outcome  4/6/2025 0910 by Oscar Gallegos RN  Outcome: Progressing  Intervention: Promote Effective Coping Strategies  Flowsheets (Taken 4/6/2025 0910)  Supportive  Measures: self-care encouraged  Goal: Develops/Participates in Therapeutic [place] to Support Successful Transition  4/6/2025 0910 by Oscar Gallegos RN  Outcome: Progressing  Flowsheets (Taken 4/6/2025 0910)  Develops/Participates in Therapeutic [place] to Support Successful Transition: making progress toward outcome  4/6/2025 0910 by Oscar Gallegos RN  Outcome: Progressing  Intervention: Foster Therapeutic [place]  Flowsheets (Taken 4/6/2025 0910)  Trust Relationship/Rapport: care explained  Goal: Rounds/Family Conference  4/6/2025 0910 by Oscar Gallegos RN  Outcome: Progressing  Flowsheets (Taken 4/6/2025 0910)  Participants:   milieu/psych techs   nursing  4/6/2025 0910 by Oscar Gallegos RN  Outcome: Progressing     Problem: Psychotic Signs/Symptoms  Goal: Improved Behavioral Control (Psychotic Signs/Symptoms)  Outcome: Progressing  Flowsheets (Taken 4/6/2025 0910)  Mutually Determined Action Steps (Improved Behavioral Control): identifies symptoms triggers  Intervention: Manage Behavior  Flowsheets (Taken 4/6/2025 0910)  De-Escalation Techniques: quiet time facilitated  Goal: Optimal Cognitive Function (Psychotic Signs/Symptoms)  Outcome: Progressing  Flowsheets (Taken 4/6/2025 0910)  Mutually Determined Action Steps (Optimal Cognitive Function): participates in attention training  Intervention: Support and Promote Cognitive Ability  Flowsheets (Taken 4/6/2025 0910)  Trust Relationship/Rapport: care explained  Goal: Increased Participation and Engagement (Psychotic Signs/Symptoms)  Outcome: Progressing  Flowsheets (Taken 4/6/2025 0910)  Mutually Determined Action Steps (Increased Participation and Engagement): identifies symptoms triggers  Intervention: Facilitate Participation and Engagement  Flowsheets (Taken 4/6/2025 0910)  Supportive Measures: self-care encouraged  Diversional Activity: television  Goal: Improved Mood Symptoms (Psychotic Signs/Symptoms)  Outcome: Progressing  Flowsheets (Taken 4/6/2025  0910)  Mutually Determined Action Steps (Improved Mood Symptoms): acknowledges progress  Intervention: Optimize Emotion and Mood  Flowsheets (Taken 4/6/2025 0910)  Supportive Measures: self-care encouraged  Diversional Activity: television  Goal: Improved Psychomotor Symptoms (Psychotic Signs/Symptoms)  Outcome: Progressing  Flowsheets (Taken 4/6/2025 0910)  Mutually Determined Action Steps (Improved Psychomotor Symptoms): exhibits decrease in agitation  Intervention: Manage Psychomotor Movement  Flowsheets (Taken 4/6/2025 0910)  Activity (Behavioral Health): up ad dun[place]  Patient Performed Hygiene: teeth brushed  Diversional Activity: television  Goal: Decreased Sensory Symptoms (Psychotic Signs/Symptoms)  Outcome: Progressing  Flowsheets (Taken 4/6/2025 0910)  Mutually Determined Action Steps (Decreased Sensory Symptoms): adheres to medication regimen  Intervention: Minimize and Manage Sensory Impairment  Flowsheets (Taken 4/6/2025 0910)  Sensory Stimulation Regulation: quiet environment promoted  Goal: Improved Sleep (Psychotic Signs/Symptoms)  Outcome: Progressing  Flowsheets (Taken 4/6/2025 0910)  Mutually Determined Action Steps (Improved Sleep): sleeps 4-6 hours at night  Intervention: Promote Healthy Sleep Hygiene  Flowsheets (Taken 4/6/2025 0910)  Sleep Hygiene Promotion: regular sleep pattern promoted  Goal: Enhanced Social, Occupational or Functional Skills (Psychotic Signs/Symptoms)  Outcome: Progressing  Flowsheets (Taken 4/6/2025 0910)  Mutually Determined Action Steps (Enhanced Social, Occupational or Functional Skills): participates in social skills training  Intervention: Promote Social, Occupational and Functional Ability  Flowsheets (Taken 4/6/2025 0910)  Trust Relationship/Rapport: care explained  Social Functional Ability Promotion: autonomy promoted     Problem: Excessive Substance Use  Goal: Optimized Energy Level (Excessive Substance Use)  Outcome: Progressing  Flowsheets (Taken 4/6/2025  0910)  Mutually Determined Action Steps (Optimized Energy Level): grooms self without prompting  Intervention: Optimize Energy Level  Flowsheets (Taken 4/6/2025 0910)  Activity (Behavioral Health): up ad [place]  Patient Performed Hygiene: teeth brushed  Diversional Activity: television  Goal: Improved Behavioral Control (Excessive Substance Use)  Outcome: Progressing  Flowsheets (Taken 4/6/2025 0910)  Mutually Determined Action Steps (Improved Behavioral Control): identifies major stressors  Intervention: Promote Behavior and Impulse Control  Flowsheets (Taken 4/6/2025 0910)  Behavior Management: behavioral plan reviewed  Goal: Increased Participation and Engagement (Excessive Substance Use)  Outcome: Progressing  Flowsheets (Taken 4/6/2025 0910)  Mutually Determined Action Steps (Increased Participation and Engagement): discusses ongoing recovery plan  Intervention: Facilitate Participation and Engagement  Flowsheets (Taken 4/6/2025 0910)  Supportive Measures: self-care encouraged  Diversional Activity: television  Goal: Improved Physiologic Symptoms (Excessive Substance Use)  Outcome: Progressing  Flowsheets (Taken 4/6/2025 0910)  Mutually Determined Action Steps (Improved Physiologic Symptoms): discusses use pattern  Intervention: Optimize Physiologic Function  Flowsheets (Taken 4/6/2025 0910)  Oral Nutrition Promotion: rest periods promoted  Nutrition Interventions: food preferences provided  Goal: Enhanced Social, Occupational or Functional Skills (Excessive Substance Use)  Outcome: Progressing  Flowsheets (Taken 4/6/2025 0910)  Mutually Determined Action Steps (Enhanced Social, Occupational or Functional Skills): participates in social skills training  Intervention: Promote Social, Occupational and Functional Ability  Flowsheets (Taken 4/6/2025 0910)  Trust Relationship/Rapport: care explained  Social Functional Ability Promotion: autonomy promoted    He is AAO X 4. Flat affect and blunted mood. Endorses anxiety  this AM. He is RTIS AEB talking to someone who isn't there. Observed to be pacing in the terrell. Fair eye contact noted. Speech normal tone and speed. Guarded. Suspicious. Minimal interactions noted with other patients and staff. Continue plan of care and provide a safe and therapeutic environment. Continue to monitor every fifteen minutes for safety.

## 2025-04-06 NOTE — NURSING
At 1809, complaints of mouth pain, PS 6/10.  Tylenol 650 mg., administered.  At 1839, verbalizes decreasing pain, PS 2/10.
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
## 2025-04-07 LAB
ALBUMIN SERPL-MCNC: 4.2 G/DL (ref 3.5–5)
ALBUMIN/GLOB SERPL: 1.4 RATIO (ref 1.1–2)
ALP SERPL-CCNC: 48 UNIT/L (ref 40–150)
ALT SERPL-CCNC: 14 UNIT/L (ref 0–55)
ANION GAP SERPL CALC-SCNC: 9 MEQ/L
AST SERPL-CCNC: 15 UNIT/L (ref 11–45)
BASOPHILS # BLD AUTO: 0.03 X10(3)/MCL
BASOPHILS NFR BLD AUTO: 0.5 %
BILIRUB SERPL-MCNC: 0.6 MG/DL
BUN SERPL-MCNC: 17.9 MG/DL (ref 8.9–20.6)
CALCIUM SERPL-MCNC: 9.6 MG/DL (ref 8.4–10.2)
CHLORIDE SERPL-SCNC: 110 MMOL/L (ref 98–107)
CHOLEST SERPL-MCNC: 153 MG/DL
CHOLEST/HDLC SERPL: 4 {RATIO} (ref 0–5)
CO2 SERPL-SCNC: 25 MMOL/L (ref 22–29)
CREAT SERPL-MCNC: 0.78 MG/DL (ref 0.72–1.25)
CREAT/UREA NIT SERPL: 23
EOSINOPHIL # BLD AUTO: 0.2 X10(3)/MCL (ref 0–0.9)
EOSINOPHIL NFR BLD AUTO: 3 %
ERYTHROCYTE [DISTWIDTH] IN BLOOD BY AUTOMATED COUNT: 12.8 % (ref 11.5–17)
EST. AVERAGE GLUCOSE BLD GHB EST-MCNC: 96.8 MG/DL
GFR SERPLBLD CREATININE-BSD FMLA CKD-EPI: >60 ML/MIN/1.73/M2
GLOBULIN SER-MCNC: 3 GM/DL (ref 2.4–3.5)
GLUCOSE SERPL-MCNC: 88 MG/DL (ref 74–100)
HBA1C MFR BLD: 5 %
HCT VFR BLD AUTO: 46.1 % (ref 42–52)
HDLC SERPL-MCNC: 37 MG/DL (ref 35–60)
HGB BLD-MCNC: 15.2 G/DL (ref 14–18)
IMM GRANULOCYTES # BLD AUTO: 0.02 X10(3)/MCL (ref 0–0.04)
IMM GRANULOCYTES NFR BLD AUTO: 0.3 %
LDLC SERPL CALC-MCNC: 105 MG/DL (ref 50–140)
LYMPHOCYTES # BLD AUTO: 2.54 X10(3)/MCL (ref 0.6–4.6)
LYMPHOCYTES NFR BLD AUTO: 38.5 %
MCH RBC QN AUTO: 31.5 PG (ref 27–31)
MCHC RBC AUTO-ENTMCNC: 33 G/DL (ref 33–36)
MCV RBC AUTO: 95.6 FL (ref 80–94)
MONOCYTES # BLD AUTO: 0.59 X10(3)/MCL (ref 0.1–1.3)
MONOCYTES NFR BLD AUTO: 8.9 %
NEUTROPHILS # BLD AUTO: 3.22 X10(3)/MCL (ref 2.1–9.2)
NEUTROPHILS NFR BLD AUTO: 48.8 %
NRBC BLD AUTO-RTO: 0 %
PLATELET # BLD AUTO: 264 X10(3)/MCL (ref 130–400)
PMV BLD AUTO: 9.7 FL (ref 7.4–10.4)
POTASSIUM SERPL-SCNC: 4.9 MMOL/L (ref 3.5–5.1)
PROT SERPL-MCNC: 7.2 GM/DL (ref 6.4–8.3)
RBC # BLD AUTO: 4.82 X10(6)/MCL (ref 4.7–6.1)
SODIUM SERPL-SCNC: 144 MMOL/L (ref 136–145)
T PALLIDUM AB SER QL: NONREACTIVE
TRIGL SERPL-MCNC: 55 MG/DL (ref 34–140)
TSH SERPL-ACNC: 1.18 UIU/ML (ref 0.35–4.94)
VLDLC SERPL CALC-MCNC: 11 MG/DL
WBC # BLD AUTO: 6.6 X10(3)/MCL (ref 4.5–11.5)

## 2025-04-07 PROCEDURE — 80061 LIPID PANEL: CPT | Performed by: PSYCHIATRY & NEUROLOGY

## 2025-04-07 PROCEDURE — 25000003 PHARM REV CODE 250

## 2025-04-07 PROCEDURE — 11400000 HC PSYCH PRIVATE ROOM

## 2025-04-07 PROCEDURE — 84443 ASSAY THYROID STIM HORMONE: CPT | Performed by: PSYCHIATRY & NEUROLOGY

## 2025-04-07 PROCEDURE — 85025 COMPLETE CBC W/AUTO DIFF WBC: CPT | Performed by: PSYCHIATRY & NEUROLOGY

## 2025-04-07 PROCEDURE — 36415 COLL VENOUS BLD VENIPUNCTURE: CPT | Performed by: PSYCHIATRY & NEUROLOGY

## 2025-04-07 PROCEDURE — 86780 TREPONEMA PALLIDUM: CPT | Performed by: PSYCHIATRY & NEUROLOGY

## 2025-04-07 PROCEDURE — 25000003 PHARM REV CODE 250: Performed by: PSYCHIATRY & NEUROLOGY

## 2025-04-07 PROCEDURE — S4991 NICOTINE PATCH NONLEGEND: HCPCS | Performed by: PSYCHIATRY & NEUROLOGY

## 2025-04-07 PROCEDURE — 83036 HEMOGLOBIN GLYCOSYLATED A1C: CPT | Performed by: PSYCHIATRY & NEUROLOGY

## 2025-04-07 PROCEDURE — 80053 COMPREHEN METABOLIC PANEL: CPT | Performed by: PSYCHIATRY & NEUROLOGY

## 2025-04-07 RX ADMIN — OLANZAPINE 10 MG: 5 TABLET, FILM COATED ORAL at 08:04

## 2025-04-07 RX ADMIN — NICOTINE 1 PATCH: 21 PATCH, EXTENDED RELEASE TRANSDERMAL at 08:04

## 2025-04-07 NOTE — PROGRESS NOTES
04/07/25 1500   [facility] Group Therapy   Group Name Therapeutic Recreation   Specific Interventions Skilled Activity Self-Expression   Participation Level Active;Supportive;Appropriate;Attentive;Sharing   Participation Quality Cooperative   Insight/Motivation Improved;Applies New Skills   Affect/Mood Display Appropriate   Cognition Oriented;Alert   Psychomotor WNL

## 2025-04-07 NOTE — GROUP NOTE
Group Psychotherapy       Group Focus: Promoting Healthy Lifestyles  Group topic: Coping Skills: Therapist explored patients need for increase in effective coping skills to deal with stress or anxiety.         Number of patients in attendance: 8  Group Start Time: 1045  Group End Time:  1130  Groups Date: 4/7/2025  Group Topic:  Behavioral Health  Group Department: [facility] Behavioral Health Unit  Group Facilitators:  Kary Joshua MSW  _____________________________________________________________________    Patient Name: Oscar Canseco  MRN: [id number]  Patient Class: IP- Psych   Admission Date\Time: 4/2/2025  7:58 PM  Hospital Length of Stay: 6  Primary Care Provider: Edwina Jung NP (Inactive)     Referred by: Acute Psychiatry Unit Treatment Team     Target symptoms: Depression, Psychosis, and Poor Coping Skills     Patient's response to treatment: Active Listening     Progress toward goals: Minimal progress    Plan: Continue treatment on APU

## 2025-04-07 NOTE — PROGRESS NOTES
"4/7/2025  Oscar Canseco   1990   [id number]        Psychiatry Progress Note     Chief Complaint: "I mean, I'm getting used to it, I guess."    SUBJECTIVE:   Oscar Canseco is a 34 y.o. male with a known history of bipolar disorder with manic episodes, placed under a Physicians Emergency Certificate (PEC) at [facility], after presenting to [facility] ED on 4/2/2025 with his mother during what was described as an active manic episode.     Staff reports patient's behavior and interactions have been improving.  Better group attendance.  Calm and cooperative during evaluation.  Reports improvement in hallucinations this morning.  Tolerating current treatment regimen without issues.  Denies thoughts of self-harm or harm to others.  No overt behavioral issues reported by staff.  States that he is still uncomfortable in crowds but that this is the case whether he is stable or not.  Will continue current treatment plan and will monitor for the need to augment.       UDS: (+)cannabis  Blood alcohol: <10      Current Medications:   Scheduled Meds:    nicotine  1 patch Transdermal Daily    OLANZapine  10 mg Oral QHS      PRN Meds:   Current Facility-Administered Medications:     acetaminophen, 650 mg, Oral, Q6H PRN    aluminum-magnesium hydroxide-simethicone, 30 mL, Oral, Q6H PRN    haloperidoL, 5 mg, Oral, Q6H PRN **AND** diphenhydrAMINE, 50 mg, Oral, Q6H PRN **AND** LORazepam, 2 mg, Oral, Q6H PRN **AND** haloperidol lactate, 5 mg, Intramuscular, Q6H PRN **AND** diphenhydrAMINE, 50 mg, Intramuscular, Q6H PRN **AND** lorazepam, 2 mg, Intramuscular, Q6H PRN    hydrOXYzine HCL, 50 mg, Oral, Q4H PRN    traZODone, 100 mg, Oral, Nightly PRN   Psychotherapeutics (From admission, onward)      Start     Stop Route Frequency Ordered    04/03/25 2100  OLANZapine tablet 10 mg         -- Oral Nightly 04/03/25 0933 04/02/25 2048  traZODone tablet 100 mg         -- Oral Nightly PRN 04/02/25 2048 04/02/25 2048  haloperidoL tablet 5 mg  " "(Med - Acute  Behavioral Management)        Placed in "And" Linked Group    -- Oral Every 6 hours PRN 04/02/25 2048    04/02/25 2048  LORazepam tablet 2 mg  (Med - Acute  Behavioral Management)        Placed in "And" Linked Group    -- Oral Every 6 hours PRN 04/02/25 2048 04/02/25 2048  haloperidol lactate injection 5 mg  (Med - Acute  Behavioral Management)        Placed in "And" Linked Group    -- IM Every 6 hours PRN 04/02/25 2048 04/02/25 2048  LORazepam injection 2 mg  (Med - Acute  Behavioral Management)        Placed in "And" Linked Group    -- IM Every 6 hours PRN 04/02/25 2048            Allergies:   Review of patient's allergies indicates:  No Known Allergies     OBJECTIVE:   Vitals   Vitals:    04/07/25 0701   BP: (!) 146/81   Pulse: 63   Resp: 18   Temp: 98.7 °F (37.1 °C)        Labs/Imaging/Studies:   Recent Results (from the past 36 hours)   Lipid panel    Collection Time: 04/07/25  7:22 AM   Result Value Ref Range    Cholesterol Total 153 <=200 mg/dL    HDL Cholesterol 37 35 - 60 mg/dL    Triglyceride 55 34 - 140 mg/dL    Cholesterol/HDL Ratio 4 0 - 5    Very Low Density Lipoprotein 11     LDL Cholesterol 105.00 50.00 - 140.00 mg/dL   Comprehensive metabolic panel    Collection Time: 04/07/25  7:22 AM   Result Value Ref Range    Sodium 144 136 - 145 mmol/L    Potassium 4.9 3.5 - 5.1 mmol/L    Chloride 110 (H) 98 - 107 mmol/L    CO2 25 22 - 29 mmol/L    Glucose 88 74 - 100 mg/dL    Blood Urea Nitrogen 17.9 8.9 - 20.6 mg/dL    Creatinine 0.78 0.72 - 1.25 mg/dL    Calcium 9.6 8.4 - 10.2 mg/dL    Protein Total 7.2 6.4 - 8.3 gm/dL    Albumin 4.2 3.5 - 5.0 g/dL    Globulin 3.0 2.4 - 3.5 gm/dL    Albumin/Globulin Ratio 1.4 1.1 - 2.0 ratio    Bilirubin Total 0.6 <=1.5 mg/dL    ALP 48 40 - 150 unit/L    ALT 14 0 - 55 unit/L    AST 15 11 - 45 unit/L    eGFR >60 mL/min/1.73/m2    Anion Gap 9.0 mEq/L    BUN/Creatinine Ratio 23    TSH    Collection Time: 04/07/25  7:22 AM   Result Value Ref Range    TSH " "1.179 0.350 - 4.940 uIU/mL   Hemoglobin A1C    Collection Time: 04/07/25  7:22 AM   Result Value Ref Range    Hemoglobin A1c 5.0 <=7.0 %    Estimated Average Glucose 96.8 mg/dL   CBC with Differential    Collection Time: 04/07/25  7:22 AM   Result Value Ref Range    WBC 6.60 4.50 - 11.50 x10(3)/mcL    RBC 4.82 4.70 - 6.10 x10(6)/mcL    Hgb 15.2 14.0 - 18.0 g/dL    Hct 46.1 42.0 - 52.0 %    MCV 95.6 (H) 80.0 - 94.0 fL    MCH 31.5 (H) 27.0 - 31.0 pg    MCHC 33.0 33.0 - 36.0 g/dL    RDW 12.8 11.5 - 17.0 %    Platelet 264 130 - 400 x10(3)/mcL    MPV 9.7 7.4 - 10.4 fL    Neut % 48.8 %    Lymph % 38.5 %    Mono % 8.9 %    Eos % 3.0 %    Basophil % 0.5 %    Imm Grans % 0.3 %    Neut # 3.22 2.1 - 9.2 x10(3)/mcL    Lymph # 2.54 0.6 - 4.6 x10(3)/mcL    Mono # 0.59 0.1 - 1.3 x10(3)/mcL    Eos # 0.20 0 - 0.9 x10(3)/mcL    Baso # 0.03 <=0.2 x10(3)/mcL    Imm Gran # 0.02 0.00 - 0.04 x10(3)/mcL    NRBC% 0.0 %          Medical Review Of Systems:  Constitutional: negative  Respiratory: negative  Cardiovascular: negative  Gastrointestinal: negative  Genitourinary:negative  Musculoskeletal:negative  Neurological: negative       Psychiatric Mental Status Exam:  General Appearance: appears stated age, well-developed, well-nourished  Arousal: alert  Behavior: cooperative  Movements and Motor Activity: no abnormal involuntary movements noted  Orientation: oriented to person, place, time, and situation  Speech: normal rate, normal rhythm, normal volume, normal tone  Mood: "Good"  Affect: constricted  Thought Process: linear  Associations: intact  Thought Content and Perceptions: hallucinations and delusions improved, no suicidal ideation, no homicidal ideation  Recent and Remote Memory: recent memory intact, remote memory intact; per interview/observation with patient  Attention and Concentration: intact, attentive to conversation; per interview/observation with patient  Fund of Knowledge: intact, aware of current events, vocabulary " appropriate; based on history, vocabulary, fund of knowledge, syntax, grammar, and content  Insight: questionable; based on understanding of severity of illness and HPI  Judgment: questionable; based on patient's behavior and HPI     ASSESSMENT/PLAN:   Problems Addressed/Diagnoses:  Bipolar disorder, most recent episode manic, severe, with psychotic features (F31.2)  Cannabis use disorder (F12.20)       Past Medical History:   Diagnosis Date    Bipolar disorder     History of psychiatric hospitalization     Hx of psychiatric care     Therapy         Plan:  Bipolar disorder, chronic with acute exacerbation  -Continue Zyprexa    Cannabis use, chronic with acute exacerbation  -Group/Individual psychotherapy     Expected Disposition Plan: Home        Wil Virgen M.D.

## 2025-04-07 NOTE — PROGRESS NOTES
04/07/25 1000   [facility] Group Therapy   Group Name Therapeutic Recreation   Specific Interventions Skilled Activity Mild Exercises   Participation Level Active;Supportive;Appropriate;Attentive;Sharing   Participation Quality Cooperative   Insight/Motivation Improved;Applies New Skills   Affect/Mood Display Appropriate   Cognition Oriented;Alert   Psychomotor WNL

## 2025-04-08 PROCEDURE — 25000003 PHARM REV CODE 250

## 2025-04-08 PROCEDURE — 11400000 HC PSYCH PRIVATE ROOM

## 2025-04-08 PROCEDURE — S4991 NICOTINE PATCH NONLEGEND: HCPCS | Performed by: PSYCHIATRY & NEUROLOGY

## 2025-04-08 PROCEDURE — 25000003 PHARM REV CODE 250: Performed by: PSYCHIATRY & NEUROLOGY

## 2025-04-08 RX ORDER — IBUPROFEN 200 MG
1 TABLET ORAL DAILY
Qty: 28 PATCH | Refills: 0 | Status: SHIPPED | OUTPATIENT
Start: 2025-04-09 | End: 2025-05-07

## 2025-04-08 RX ORDER — OLANZAPINE 10 MG/1
10 TABLET, FILM COATED ORAL NIGHTLY
Qty: 30 TABLET | Refills: 0 | Status: SHIPPED | OUTPATIENT
Start: 2025-04-08 | End: 2025-05-08

## 2025-04-08 RX ADMIN — NICOTINE 1 PATCH: 21 PATCH, EXTENDED RELEASE TRANSDERMAL at 08:04

## 2025-04-08 RX ADMIN — OLANZAPINE 10 MG: 5 TABLET, FILM COATED ORAL at 08:04

## 2025-04-08 NOTE — PROGRESS NOTES
04/08/25 [address] [place] [address] Group Therapy   Group Name Therapeutic Recreation   Specific Interventions Skilled Activity Leisure Education and Awareness   Participation Level Active;Supportive;Appropriate;Attentive;Sharing   Participation Quality Cooperative;Social   Insight/Motivation Good;Applies New Skills   Affect/Mood Display Appropriate;Bright   Cognition Oriented;Alert   Psychomotor WNL

## 2025-04-08 NOTE — PLAN OF CARE
Problem: Adult Behavioral Health Plan of Care  Goal: Plan of Care Review  Outcome: Progressing  Flowsheets (Taken 4/8/2025 0847)  Patient Agreement with Plan of Care: agrees  Plan of Care Reviewed With: patient  Goal: Patient-Specific Goal (Individualization)  Outcome: Progressing  Flowsheets (Taken 4/8/2025 0847)  Patient Personal Strengths: independent living skills  Patient Vulnerabilities: substance abuse/addiction  Goal: Adheres to Safety Considerations for Self and Others  Outcome: Progressing  Flowsheets (Taken 4/8/2025 0847)  Adheres to Safety Considerations for Self and Others: making progress toward outcome  Intervention: Develop and Maintain Individualized Safety Plan  Flowsheets (Taken 4/8/2025 0847)  Safety Measures: safety rounds completed  Goal: Absence of New-Onset Illness or Injury  Outcome: Progressing  Intervention: Identify and Manage Fall Risk  Flowsheets (Taken 4/8/2025 0847)  Safety Measures: safety rounds completed  Intervention: Prevent VTE (Venous Thromboembolism)  Flowsheets (Taken 4/8/2025 0847)  VTE Prevention/Management: fluids promoted  Intervention: Prevent Infection  Flowsheets (Taken 4/8/2025 0847)  Infection Prevention: rest/sleep promoted  Goal: Optimized Coping Skills in Response to Life Stressors  Outcome: Progressing  Flowsheets (Taken 4/8/2025 0847)  Optimized Coping Skills in Response to Life Stressors: making progress toward outcome  Intervention: Promote Effective Coping Strategies  Flowsheets (Taken 4/8/2025 0847)  Supportive Measures: self-care encouraged  Goal: Develops/Participates in Therapeutic [place] to Support Successful Transition  Outcome: Progressing  Flowsheets (Taken 4/8/2025 0847)  Develops/Participates in Therapeutic [place] to Support Successful Transition: making progress toward outcome  Intervention: Foster Therapeutic [place]  Flowsheets (Taken 4/8/2025 0847)  Trust Relationship/Rapport: care explained  Goal: Rounds/Family Conference  Outcome:  Progressing  Flowsheets (Taken 4/8/2025 0847)  Participants:   milieu/psych techs   nursing     Problem: Psychotic Signs/Symptoms  Goal: Improved Behavioral Control (Psychotic Signs/Symptoms)  Outcome: Progressing  Flowsheets (Taken 4/8/2025 0847)  Mutually Determined Action Steps (Improved Behavioral Control): identifies symptoms triggers  Intervention: Manage Behavior  Flowsheets (Taken 4/8/2025 0847)  De-Escalation Techniques: quiet time facilitated  Goal: Optimal Cognitive Function (Psychotic Signs/Symptoms)  Outcome: Progressing  Flowsheets (Taken 4/8/2025 0847)  Mutually Determined Action Steps (Optimal Cognitive Function): participates in attention training  Intervention: Support and Promote Cognitive Ability  Flowsheets (Taken 4/8/2025 0847)  Trust Relationship/Rapport: care explained  Goal: Increased Participation and Engagement (Psychotic Signs/Symptoms)  Outcome: Progressing  Flowsheets (Taken 4/8/2025 0847)  Mutually Determined Action Steps (Increased Participation and Engagement): identifies symptoms triggers  Intervention: Facilitate Participation and Engagement  Flowsheets (Taken 4/8/2025 0847)  Supportive Measures: self-care encouraged  Diversional Activity: television  Goal: Improved Mood Symptoms (Psychotic Signs/Symptoms)  Outcome: Progressing  Flowsheets (Taken 4/8/2025 0847)  Mutually Determined Action Steps (Improved Mood Symptoms): acknowledges progress  Intervention: Optimize Emotion and Mood  Flowsheets (Taken 4/8/2025 0847)  Supportive Measures: self-care encouraged  Diversional Activity: television  Goal: Improved Psychomotor Symptoms (Psychotic Signs/Symptoms)  Outcome: Progressing  Flowsheets (Taken 4/8/2025 0847)  Mutually Determined Action Steps (Improved Psychomotor Symptoms): adheres to medication regimen  Intervention: Manage Psychomotor Movement  Flowsheets (Taken 4/8/2025 0847)  Activity (Behavioral Health): up ad [place]  Patient Performed Hygiene: teeth brushed  Diversional  Activity: television  Goal: Decreased Sensory Symptoms (Psychotic Signs/Symptoms)  Outcome: Progressing  Flowsheets (Taken 4/8/2025 0847)  Mutually Determined Action Steps (Decreased Sensory Symptoms): adheres to medication regimen  Intervention: Minimize and Manage Sensory Impairment  Flowsheets (Taken 4/8/2025 0847)  Sensory Stimulation Regulation: quiet environment promoted  Goal: Improved Sleep (Psychotic Signs/Symptoms)  Outcome: Progressing  Flowsheets (Taken 4/8/2025 0847)  Mutually Determined Action Steps (Improved Sleep): sleeps 4-6 hours at night  Intervention: Promote Healthy Sleep Hygiene  Flowsheets (Taken 4/8/2025 0847)  Sleep Hygiene Promotion: regular sleep pattern promoted  Goal: Enhanced Social, Occupational or Functional Skills (Psychotic Signs/Symptoms)  Outcome: Progressing  Flowsheets (Taken 4/8/2025 0847)  Mutually Determined Action Steps (Enhanced Social, Occupational or Functional Skills): participates in social skills training  Intervention: Promote Social, Occupational and Functional Ability  Flowsheets (Taken 4/8/2025 0847)  Trust Relationship/Rapport: care explained  Social Functional Ability Promotion: autonomy promoted     Problem: Excessive Substance Use  Goal: Optimized Energy Level (Excessive Substance Use)  Outcome: Progressing  Flowsheets (Taken 4/8/2025 0847)  Mutually Determined Action Steps (Optimized Energy Level): grooms self without prompting  Intervention: Optimize Energy Level  Flowsheets (Taken 4/8/2025 0847)  Activity (Behavioral Health): up ad [place]can  Patient Performed Hygiene: teeth brushed  Diversional Activity: television  Goal: Improved Behavioral Control (Excessive Substance Use)  Outcome: Progressing  Flowsheets (Taken 4/8/2025 0847)  Mutually Determined Action Steps (Improved Behavioral Control): identifies major stressors  Intervention: Promote Behavior and Impulse Control  Flowsheets (Taken 4/8/2025 0847)  Behavior Management: behavioral plan reviewed  Goal:  Increased Participation and Engagement (Excessive Substance Use)  Outcome: Progressing  Flowsheets (Taken 4/8/2025 0847)  Mutually Determined Action Steps (Increased Participation and Engagement): discusses ongoing recovery plan  Intervention: Facilitate Participation and Engagement  Flowsheets (Taken 4/8/2025 0847)  Supportive Measures: self-care encouraged  Diversional Activity: television  Goal: Improved Physiologic Symptoms (Excessive Substance Use)  Outcome: Progressing  Flowsheets (Taken 4/8/2025 0847)  Mutually Determined Action Steps (Improved Physiologic Symptoms): discusses use pattern  Intervention: Optimize Physiologic Function  Flowsheets (Taken 4/8/2025 0847)  Oral Nutrition Promotion: rest periods promoted  Nutrition Interventions: food preferences provided  Goal: Enhanced Social, Occupational or Functional Skills (Excessive Substance Use)  Outcome: Progressing  Flowsheets (Taken 4/8/2025 0847)  Mutually Determined Action Steps (Enhanced Social, Occupational or Functional Skills): participates in social skills training  Intervention: Promote Social, Occupational and Functional Ability  Flowsheets (Taken 4/8/2025 0847)  Trust Relationship/Rapport: care explained  Social Functional Ability Promotion: autonomy promoted    At present time, he denies SI and HI. States his hallucinations have decreased. Fair eye contact noted. Speech normal tone and speed. States his anxiety is elevated when he is near a large group of people. Minimal interactions noted with other patients and staff. Continue plan of care and provide a safe and therapeutic environment. Continue to monitor every fifteen minutes for safety.

## 2025-04-08 NOTE — PROGRESS NOTES
04/08/25 1000   [facility] Group Therapy   Group Name Therapeutic Recreation   Specific Interventions Skilled Activity Mild Exercises   Participation Level Active;Supportive;Appropriate;Attentive;Sharing   Participation Quality Cooperative;Social   Insight/Motivation Good;Applies New Skills   Affect/Mood Display Appropriate   Cognition Oriented;Alert   Psychomotor WNL

## 2025-04-08 NOTE — PLAN OF CARE
Oscar met reported treatment goal of Learn how to handle frustration with out walking away.  CTRS Discharge Recommendations:  Encouraged Pt. to actively utilize available community resources to increase leisure involvement to decrease signs and symptoms of illness.  Encouraged Pt. to utilize coping skills on a regular basis to reduce the risk of decomposition and re-hospitalization.

## 2025-04-08 NOTE — PROGRESS NOTES
"4/8/2025  Oscar Canseco   1990   [id number]        Psychiatry Progress Note     Chief Complaint: "I mean, I'm getting used to it, I guess."    SUBJECTIVE:   Oscar Canseco is a 34 y.o. male with a known history of bipolar disorder with manic episodes, placed under a Physicians Emergency Certificate (PEC) at [facility], after presenting to [facility] ED on 4/2/2025 with his mother during what was described as an active manic episode.     Today patient states that he is feeling more stable. Shows an improved insight. Calm and cooperative during evaluation. Denies hallucinations this morning. Tolerating current treatment regimen without issues. Denies thoughts of self-harm or harm to others. No overt behavioral issues reported by staff.  Will continue current treatment plan and will proceed with discharge home tomorrow.       UDS: (+)cannabis  Blood alcohol: <10      Current Medications:   Scheduled Meds:    nicotine  1 patch Transdermal Daily    OLANZapine  10 mg Oral QHS      PRN Meds:   Current Facility-Administered Medications:     acetaminophen, 650 mg, Oral, Q6H PRN    aluminum-magnesium hydroxide-simethicone, 30 mL, Oral, Q6H PRN    haloperidoL, 5 mg, Oral, Q6H PRN **AND** diphenhydrAMINE, 50 mg, Oral, Q6H PRN **AND** LORazepam, 2 mg, Oral, Q6H PRN **AND** haloperidol lactate, 5 mg, Intramuscular, Q6H PRN **AND** diphenhydrAMINE, 50 mg, Intramuscular, Q6H PRN **AND** lorazepam, 2 mg, Intramuscular, Q6H PRN    hydrOXYzine HCL, 50 mg, Oral, Q4H PRN    traZODone, 100 mg, Oral, Nightly PRN   Psychotherapeutics (From admission, onward)      Start     Stop Route Frequency Ordered    04/03/25 2100  OLANZapine tablet 10 mg         -- Oral Nightly 04/03/25 0933 04/02/25 2048  traZODone tablet 100 mg         -- Oral Nightly PRN 04/02/25 2048 04/02/25 2048  haloperidoL tablet 5 mg  (Med - Acute  Behavioral Management)        Placed in "And" Linked Group    -- Oral Every 6 hours PRN 04/02/25 2048 04/02/25 2048  " "LORazepam tablet 2 mg  (Med - Acute  Behavioral Management)        Placed in "And" Linked Group    -- Oral Every 6 hours PRN 04/02/25 2048 04/02/25 2048  haloperidol lactate injection 5 mg  (Med - Acute  Behavioral Management)        Placed in "And" Linked Group    -- IM Every 6 hours PRN 04/02/25 2048 04/02/25 2048  LORazepam injection 2 mg  (Med - Acute  Behavioral Management)        Placed in "And" Linked Group    -- IM Every 6 hours PRN 04/02/25 2048            Allergies:   Review of patient's allergies indicates:  No Known Allergies     OBJECTIVE:   Vitals   Vitals:    04/07/25 0701   BP: (!) 146/81   Pulse: 63   Resp: 18   Temp: 98.7 °F (37.1 °C)        Labs/Imaging/Studies:   Recent Results (from the past 36 hours)   Lipid panel    Collection Time: 04/07/25  7:22 AM   Result Value Ref Range    Cholesterol Total 153 <=200 mg/dL    HDL Cholesterol 37 35 - 60 mg/dL    Triglyceride 55 34 - 140 mg/dL    Cholesterol/HDL Ratio 4 0 - 5    Very Low Density Lipoprotein 11     LDL Cholesterol 105.00 50.00 - 140.00 mg/dL   Comprehensive metabolic panel    Collection Time: 04/07/25  7:22 AM   Result Value Ref Range    Sodium 144 136 - 145 mmol/L    Potassium 4.9 3.5 - 5.1 mmol/L    Chloride 110 (H) 98 - 107 mmol/L    CO2 25 22 - 29 mmol/L    Glucose 88 74 - 100 mg/dL    Blood Urea Nitrogen 17.9 8.9 - 20.6 mg/dL    Creatinine 0.78 0.72 - 1.25 mg/dL    Calcium 9.6 8.4 - 10.2 mg/dL    Protein Total 7.2 6.4 - 8.3 gm/dL    Albumin 4.2 3.5 - 5.0 g/dL    Globulin 3.0 2.4 - 3.5 gm/dL    Albumin/Globulin Ratio 1.4 1.1 - 2.0 ratio    Bilirubin Total 0.6 <=1.5 mg/dL    ALP 48 40 - 150 unit/L    ALT 14 0 - 55 unit/L    AST 15 11 - 45 unit/L    eGFR >60 mL/min/1.73/m2    Anion Gap 9.0 mEq/L    BUN/Creatinine Ratio 23    TSH    Collection Time: 04/07/25  7:22 AM   Result Value Ref Range    TSH 1.179 0.350 - 4.940 uIU/mL   Hemoglobin A1C    Collection Time: 04/07/25  7:22 AM   Result Value Ref Range    Hemoglobin A1c 5.0 <=7.0 % " "   Estimated Average Glucose 96.8 mg/dL   SYPHILIS ANTIBODY (WITH REFLEX RPR)    Collection Time: 04/07/25  7:22 AM   Result Value Ref Range    Syphilis Antibody Nonreactive Nonreactive, Equivocal   CBC with Differential    Collection Time: 04/07/25  7:22 AM   Result Value Ref Range    WBC 6.60 4.50 - 11.50 x10(3)/mcL    RBC 4.82 4.70 - 6.10 x10(6)/mcL    Hgb 15.2 14.0 - 18.0 g/dL    Hct 46.1 42.0 - 52.0 %    MCV 95.6 (H) 80.0 - 94.0 fL    MCH 31.5 (H) 27.0 - 31.0 pg    MCHC 33.0 33.0 - 36.0 g/dL    RDW 12.8 11.5 - 17.0 %    Platelet 264 130 - 400 x10(3)/mcL    MPV 9.7 7.4 - 10.4 fL    Neut % 48.8 %    Lymph % 38.5 %    Mono % 8.9 %    Eos % 3.0 %    Basophil % 0.5 %    Imm Grans % 0.3 %    Neut # 3.22 2.1 - 9.2 x10(3)/mcL    Lymph # 2.54 0.6 - 4.6 x10(3)/mcL    Mono # 0.59 0.1 - 1.3 x10(3)/mcL    Eos # 0.20 0 - 0.9 x10(3)/mcL    Baso # 0.03 <=0.2 x10(3)/mcL    Imm Gran # 0.02 0.00 - 0.04 x10(3)/mcL    NRBC% 0.0 %          Medical Review Of Systems:  Constitutional: negative  Respiratory: negative  Cardiovascular: negative  Gastrointestinal: negative  Genitourinary:negative  Musculoskeletal:negative  Neurological: negative       Psychiatric Mental Status Exam:  General Appearance: appears stated age, well-developed, well-nourished  Arousal: alert  Behavior: cooperative  Movements and Motor Activity: no abnormal involuntary movements noted  Orientation: oriented to person, place, time, and situation  Speech: normal rate, normal rhythm, normal volume, normal tone  Mood: "Better"  Affect: constricted  Thought Process: linear  Associations: intact  Thought Content and Perceptions: hallucinations and delusions improved, no suicidal ideation, no homicidal ideation  Recent and Remote Memory: recent memory intact, remote memory intact; per interview/observation with patient  Attention and Concentration: intact, attentive to conversation; per interview/observation with patient  Fund of Knowledge: intact, aware of current " events, vocabulary appropriate; based on history, vocabulary, fund of knowledge, syntax, grammar, and content  Insight: questionable; based on understanding of severity of illness and HPI  Judgment: questionable; based on patient's behavior and HPI     ASSESSMENT/PLAN:   Problems Addressed/Diagnoses:  Bipolar disorder, most recent episode manic, severe, with psychotic features (F31.2)  Cannabis use disorder (F12.20)       Past Medical History:   Diagnosis Date    Bipolar disorder     History of psychiatric hospitalization     Hx of psychiatric care     Therapy         Plan:  Bipolar disorder, chronic with acute exacerbation  -Continue Zyprexa    Cannabis use, chronic with acute exacerbation  -Group/Individual psychotherapy     Expected Disposition Plan: Home        Ahsan Amlonte, DARSHANP-BC

## 2025-04-08 NOTE — PLAN OF CARE
Problem: Adult Behavioral Health Plan of Care  Goal: Plan of Care Review  Outcome: Progressing  Flowsheets (Taken 4/8/2025 0431)  Patient Agreement with Plan of Care: agrees  Plan of Care Reviewed With: patient  Goal: Patient-Specific Goal (Individualization)  Outcome: Progressing  Flowsheets (Taken 4/8/2025 0431)  Patient Personal Strengths: independent living skills  Patient Vulnerabilities: substance abuse/addiction  Goal: Adheres to Safety Considerations for Self and Others  Outcome: Progressing  Flowsheets (Taken 4/8/2025 0431)  Adheres to Safety Considerations for Self and Others: making progress toward outcome  Intervention: Develop and Maintain Individualized Safety Plan  Flowsheets (Taken 4/8/2025 0431)  Safety Measures: safety rounds completed  Goal: Absence of New-Onset Illness or Injury  Outcome: Progressing  Intervention: Identify and Manage Fall Risk  Flowsheets (Taken 4/8/2025 0431)  Safety Measures: safety rounds completed  Intervention: Prevent Skin Injury  Flowsheets (Taken 4/8/2025 0431)  Device Skin Pressure Protection: adhesive use limited  Intervention: Prevent VTE (Venous Thromboembolism)  Flowsheets (Taken 4/8/2025 0431)  VTE Prevention/Management: fluids promoted  Intervention: Prevent Infection  Flowsheets (Taken 4/8/2025 0431)  Infection Prevention: hand hygiene promoted  Goal: Optimized Coping Skills in Response to Life Stressors  Outcome: Progressing  Flowsheets (Taken 4/8/2025 0431)  Optimized Coping Skills in Response to Life Stressors: making progress toward outcome  Intervention: Promote Effective Coping Strategies  Flowsheets (Taken 4/8/2025 0431)  Supportive Measures: self-care encouraged  Goal: Develops/Participates in Therapeutic [place] to Support Successful Transition  Outcome: Progressing  Flowsheets (Taken 4/8/2025 0431)  Develops/Participates in Therapeutic [place] to Support Successful Transition: making progress toward outcome  Intervention: Foster Therapeutic  [place]  Flowsheets (Taken 4/8/2025 0431)  Trust Relationship/Rapport: care explained  Goal: Rounds/Family Conference  Outcome: Progressing     Problem: Psychotic Signs/Symptoms  Goal: Improved Behavioral Control (Psychotic Signs/Symptoms)  Outcome: Progressing  Intervention: Manage Behavior  Flowsheets (Taken 4/8/2025 0431)  De-Escalation Techniques: quiet time facilitated  Goal: Optimal Cognitive Function (Psychotic Signs/Symptoms)  Outcome: Progressing  Flowsheets (Taken 4/8/2025 0431)  Mutually Determined Action Steps (Optimal Cognitive Function): participates in attention training  Intervention: Support and Promote Cognitive Ability  Flowsheets (Taken 4/8/2025 0431)  Trust Relationship/Rapport: care explained  Goal: Increased Participation and Engagement (Psychotic Signs/Symptoms)  Outcome: Progressing  Flowsheets (Taken 4/8/2025 0431)  Mutually Determined Action Steps (Increased Participation and Engagement): identifies symptoms triggers  Intervention: Facilitate Participation and Engagement  Flowsheets (Taken 4/8/2025 0431)  Supportive Measures: self-care encouraged  Diversional Activity: television  Goal: Improved Mood Symptoms (Psychotic Signs/Symptoms)  Outcome: Progressing  Flowsheets (Taken 4/8/2025 0431)  Mutually Determined Action Steps (Improved Mood Symptoms): acknowledges progress  Intervention: Optimize Emotion and Mood  Flowsheets (Taken 4/8/2025 0431)  Supportive Measures: self-care encouraged  Diversional Activity: television  Goal: Improved Psychomotor Symptoms (Psychotic Signs/Symptoms)  Outcome: Progressing  Flowsheets (Taken 4/8/2025 0431)  Mutually Determined Action Steps (Improved Psychomotor Symptoms): adheres to medication regimen  Intervention: Manage Psychomotor Movement  Flowsheets (Taken 4/8/2025 0431)  Activity (Behavioral Health): activity adjusted per tolerance  Diversional Activity: television  Goal: Decreased Sensory Symptoms (Psychotic Signs/Symptoms)  Outcome:  Progressing  Flowsheets (Taken 4/8/2025 0431)  Mutually Determined Action Steps (Decreased Sensory Symptoms): adheres to medication regimen  Intervention: Minimize and Manage Sensory Impairment  Flowsheets (Taken 4/8/2025 0431)  Sensory Stimulation Regulation: quiet environment promoted  Goal: Improved Sleep (Psychotic Signs/Symptoms)  Outcome: Progressing  Flowsheets (Taken 4/8/2025 0431)  Mutually Determined Action Steps (Improved Sleep): sleeps 4-6 hours at night  Intervention: Promote Healthy Sleep Hygiene  Flowsheets (Taken 4/8/2025 0431)  Sleep Hygiene Promotion: awakenings minimized  Goal: Enhanced Social, Occupational or Functional Skills (Psychotic Signs/Symptoms)  Outcome: Progressing  Flowsheets (Taken 4/8/2025 0431)  Mutually Determined Action Steps (Enhanced Social, Occupational or Functional Skills): participates in social skills training  Intervention: Promote Social, Occupational and Functional Ability  Flowsheets (Taken 4/8/2025 0431)  Trust Relationship/Rapport: care explained  Social Functional Ability Promotion: autonomy promoted     Problem: Excessive Substance Use  Goal: Optimized Energy Level (Excessive Substance Use)  Outcome: Progressing  Flowsheets (Taken 4/8/2025 0431)  Mutually Determined Action Steps (Optimized Energy Level): grooms self without prompting  Intervention: Optimize Energy Level  Flowsheets (Taken 4/8/2025 0431)  Activity (Behavioral Health): activity adjusted per tolerance  Diversional Activity: television  Goal: Improved Behavioral Control (Excessive Substance Use)  Outcome: Progressing  Flowsheets (Taken 4/8/2025 0431)  Mutually Determined Action Steps (Improved Behavioral Control): identifies major stressors  Intervention: Promote Behavior and Impulse Control  Flowsheets (Taken 4/8/2025 0431)  Behavior Management: behavioral plan developed  Goal: Increased Participation and Engagement (Excessive Substance Use)  Outcome: Progressing  Flowsheets (Taken 4/8/2025  "0431)  Mutually Determined Action Steps (Increased Participation and Engagement): discusses ongoing recovery plan  Intervention: Facilitate Participation and Engagement  Flowsheets (Taken 4/8/2025 0431)  Supportive Measures: self-care encouraged  Diversional Activity: television  Goal: Improved Physiologic Symptoms (Excessive Substance Use)  Outcome: Progressing  Flowsheets (Taken 4/8/2025 0431)  Mutually Determined Action Steps (Improved Physiologic Symptoms): discusses use pattern  Intervention: Optimize Physiologic Function  Flowsheets (Taken 4/8/2025 0431)  Oral Nutrition Promotion: rest periods promoted  Nutrition Interventions: food preferences provided  Goal: Enhanced Social, Occupational or Functional Skills (Excessive Substance Use)  Outcome: Progressing  Flowsheets (Taken 4/8/2025 0431)  Mutually Determined Action Steps (Enhanced Social, Occupational or Functional Skills): participates in social skills training  Intervention: Promote Social, Occupational and Functional Ability  Flowsheets (Taken 4/8/2025 0431)  Trust Relationship/Rapport: care explained  Social Functional Ability Promotion: autonomy promoted    AAOx4. Flat affect. Mood improving. Denies suicidal ideations and hallucinations. Withdrawn and isolative. Minimal interaction with staff and peers. States, "I don't like people." Thought process clearer. No responding to internal stimuli noted. Answering questions appropriately. Compliant with medications and reports that they are effective. Reports that he is eating and sleeping well when he takes the Trazodone.  No agitation or aggression noted at this time. Continue with plan of care and q 15 minute safety checks.   "

## 2025-04-09 VITALS
RESPIRATION RATE: 19 BRPM | BODY MASS INDEX: 24.7 KG/M2 | WEIGHT: 182.38 LBS | HEIGHT: 72 IN | OXYGEN SATURATION: 100 % | TEMPERATURE: 98 F | HEART RATE: 64 BPM | SYSTOLIC BLOOD PRESSURE: 124 MMHG | DIASTOLIC BLOOD PRESSURE: 82 MMHG

## 2025-04-09 PROBLEM — F12.20 CANNABIS DEPENDENCE, CONTINUOUS: Status: ACTIVE | Noted: 2025-04-09

## 2025-04-09 NOTE — DISCHARGE SUMMARY
DISCHARGE SUMMARY  PSYCHIATRY      Admit Date: 4/2/2025  7:58 PM    Discharge Date:  4/9/2025    SITE:   [facility]    Discharge Attending Physician: Wil Virgen M.D.    Chief Complaint:  Heidi     History of Present Illness On Admit:   Oscar Canseco is a 34-year-old male with a known history of bipolar disorder with manic episodes, placed under a Physicians Emergency Certificate (PEC) at [facility], after presenting to [facility] ED on 4/2/2025 with his mother during what was described as an active manic episode. The patient admitted to poor adherence to his psychiatric medications, then diverted the conversation to discussing nicotine use, showing flight of ideas and distractibility. He was noted to be pacing and clenching his fists, though he denied suicidal ideation. When asked directly about homicidal ideation, he refused to answer, and although he did not make direct threats, his mother reported that if there was anyone he may target, it could be his father. The patient did not confirm or deny this when questioned. Although he denies auditory hallucinations, his behavioral cues--including distracted responses and unusual movements--suggest the possibility of responding to internal stimuli.    According to staff, he exhibited manic and disorganized behavior the prior evening, requiring an oral PRN, which he accepted and tolerated. During todays evaluation, the patient presented as calm and cooperative, but continued to demonstrate paranoid behavior, including hypervigilance, scanning the room, and taking mental notes during the interview. While he maintained attention, his overly observant and suspicious demeanor was notable.    He was recently hospitalized here in November 2024 for a similar presentation of heidi due to medication noncompliance, and during this encounter again expressed that Zyprexa had previously been effective in managing his  symptoms.    Assessment:  The patient is currently experiencing an acute manic episode marked by psychomotor agitation, paranoia, poor insight, and possible psychotic features. His noncompliance with medication, history of recurrent hospitalizations, and refusal to engage in safety-related questioning raise significant concern. Although not overtly threatening, the combination of paranoia, irritability, and unresolved homicidal risk (as reported by his mother) increases the need for inpatient stabilization.       Admit Mental Status Exam:  General Appearance: appears stated age, well developed and nourished, adequately groomed and appropriately dressed, in no acute distress  Arousal: alert  Behavior: normal; cooperative; reasonably friendly, pleasant, and polite; appropriate eye-contact; under good behavioral control  Movements and Motor Activity: no abnormal involuntary movements noted; no tics, no tremors, no akathisia, no dystonia, no evidence of tardive dyskinesia; no psychomotor agitation or retardation  Orientation: oriented to person, place, time, and situation  Speech: intact; normal rate, rhythm, volume, tone and pitch; conversational, spontaneous, and coherent  Mood: Dysphoric and Guarded  Affect: constricted  Thought Process: intact, linear, goal-directed, organized, logical  Associations: intact, no loosening of associations  Thought Content and Perceptions: no suicidal or homicidal ideation, no auditory or visual hallucinations, no paranoid ideation, no ideas of reference, no evidence of delusions or psychosis  Recent and Remote Memory: grossly intact, able to recall relevant and salient information from the recent and remote past; per interview/observation with patient  Attention and Concentration: grossly intact, attentive to the conversation and not readily distractible; per interview/observation with patient  Fund of Knowledge: grossly intact, used appropriate vocabulary and demonstrated an  awareness of current events; based on history, vocabulary, fund of knowledge, syntax, grammar, and content  Insight: intact; based on understanding of severity of illness and HPI  Judgment: questionable; based on patient's behavior and HPI      Diagnoses:  PRINCIPAL PROBLEM:  Bipolar I, most recent episode manic, severe with psychotic behavior      PROBLEM LIST    Bipolar I, most recent episode manic, severe with psychotic behavior    Cannabis dependence, continuous        Hospital Course:   Patient was admitted to [facility] and started on Zyprexa.    4/4/25  Patient presents today calm and cooperative but slightly irritable. Patient states that he would like some klonopin while he's here. He does have a previous history of Klonopin prescription but he endorsed to me prior to this that he does not take it daily. He endorsed taking it two to three times a week. His UDS was negative for benzodiazepines. Patient also shows no signs of distress or anxiety at this time. Will continue with current regimen and monitor for need to augment.       4/7/25  Staff reports patient's behavior and interactions have been improving.  Better group attendance.  Calm and cooperative during evaluation.  Reports improvement in hallucinations this morning.  Tolerating current treatment regimen without issues.  Denies thoughts of self-harm or harm to others.  No overt behavioral issues reported by staff.  States that he is still uncomfortable in crowds but that this is the case whether he is stable or not.  Will continue current treatment plan and will monitor for the need to augment.         UDS: (+)cannabis  Blood alcohol: <10      4/8/25  Today patient states that he is feeling more stable. Shows an improved insight. Calm and cooperative during evaluation. Denies hallucinations this morning. Tolerating current treatment regimen without issues. Denies thoughts of self-harm or harm to others. No overt behavioral issues reported by staff.  Will  "continue current treatment plan and will proceed with discharge home tomorrow.       4/9/25  Due for discharge home today.  No acute complaints.  Tolerating current treatment regimen without issues.  Denies thoughts of self-harm or harm to others.  No overt behavioral issues reported by staff.  [facility] with [facility].  Will proceed with discharge.      Current Medications:   Scheduled Meds:    nicotine  1 patch Transdermal Daily    OLANZapine  10 mg Oral QHS          DISCHARGE EXAMINATION    VITALS   Vitals:    04/06/25 1600 04/06/25 1900 04/07/25 0701 04/08/25 0900   BP: 132/87 135/82 (!) 146/81 136/84   BP Location:  Left arm     Patient Position:  Sitting     Pulse: 66 64 63 64   Resp: 18 18 18 17   Temp: 98.7 °F (37.1 °C) 98.6 °F (37 °C) 98.7 °F (37.1 °C) 97.5 °F (36.4 °C)   TempSrc:       SpO2: 100% 99% 99% 100%   Weight:       Height:             Discharge Mental Status Exam:  General Appearance: appears stated age, well-developed, well-nourished  Arousal: alert  Behavior: cooperative  Movements and Motor Activity: no abnormal involuntary movements noted  Orientation: oriented to person, place, time, and situation  Speech: normal rate, normal rhythm, normal volume, normal tone  Mood: "Ok"  Affect: constricted  Thought Process: linear  Associations: intact  Thought Content and Perceptions: no suicidal ideation, no homicidal ideation, no auditory hallucinations, no visual hallucinations, no paranoid ideation, no ideas of reference  Recent and Remote Memory: recent memory intact, remote memory intact; per interview/observation with patient  Attention and Concentration: intact, attentive to conversation; per interview/observation with patient  Fund of Knowledge: intact, aware of current events, vocabulary appropriate; based on history, vocabulary, fund of knowledge, syntax, grammar, and content  Insight: questionable; based on understanding of severity of illness and HPI  Judgment: questionable; " based on patient's behavior and HPI       Discharge Condition:  Stable    Prognosis:  Fair    Justification for multiple antipsychotics:  N/a    Disposition:  discharged to home    Follow-up:   Follow-up Information       [facility] Follow up.    Why: 4.28.2025 @2:45pm with Dr. Kramer  Contact information:  [address] [place] [id number]        [phone]-fax             Edwina Jung NP Follow up.    Specialty: Family Medicine  Contact information:  [address] [place] [id number] [phone]               [facility].    Why: Pt is not interested in smoking cessation  Contact information:  [phone]-fax                           Medication Regimen:  Current Medications[1]      Patient Instructions:   Continue medication regimen as prescribed.    Disposition plan per  - see  notes for details.    Patient instructed to call 911 or present to emergency department if any of the following complications develop status post discharge: suicidality, homicidality, or grave disability.     Total time spent discharging patient: <30 minutes      Wil Virgen M.D.       [1]   Current Facility-Administered Medications:     acetaminophen tablet 650 mg, 650 mg, Oral, Q6H PRN, Wil Virgen MD, 650 mg at 04/06/25 1809    aluminum-magnesium hydroxide-simethicone 200-200-20 mg/5 mL suspension 30 mL, 30 mL, Oral, Q6H PRN, Wil Virgen MD    haloperidoL tablet 5 mg, 5 mg, Oral, Q6H PRN, 5 mg at 04/02/25 2335 **AND** diphenhydrAMINE capsule 50 mg, 50 mg, Oral, Q6H PRN, 50 mg at 04/02/25 2335 **AND** LORazepam tablet 2 mg, 2 mg, Oral, Q6H PRN, 2 mg at 04/02/25 2335 **AND** haloperidol lactate injection 5 mg, 5 mg, Intramuscular, Q6H PRN **AND** diphenhydrAMINE injection 50 mg, 50 mg, Intramuscular, Q6H PRN **AND** LORazepam injection 2 mg, 2 mg, Intramuscular, Q6H  BOBBI, Wil Virgen MD    hydrOXYzine tablet 50 mg, 50 mg, Oral, Q4H PRN, Wil Virgen MD, 50 mg at 04/02/25 2111    nicotine 21 mg/24 hr 1 patch, 1 patch, Transdermal, Daily, Wil Virgen MD, 1 patch at 04/08/25 0817    OLANZapine tablet 10 mg, 10 mg, Oral, QHS, Ahsan Almonte, PMHNP, 10 mg at 04/08/25 2004    traZODone tablet 100 mg, 100 mg, Oral, Nightly PRN, Wil Virgen MD, 100 mg at 04/02/25 4546

## 2025-04-09 NOTE — NURSING
Patient is a discharge today per . Patient is going home to [facility] house. Discharge instructions given for medications and appointment follow up . Escorted by Mental Health Tech and  out of the building and being picked up by mother via family car.

## 2025-04-09 NOTE — NURSING
Patient alert and oriented x 4. Ambulatory with steady gait. Denies SI and HI, denies auditory and visual hallucinations , denies anxiety and depression. Patient is a discharge today per DR. Virgen. Calm and cooperative.

## 2025-04-09 NOTE — PLAN OF CARE
Problem: Adult Behavioral Health Plan of Care  Goal: Plan of Care Review  Outcome: Met  Goal: Patient-Specific Goal (Individualization)  Outcome: Met  Goal: Adheres to Safety Considerations for Self and Others  Outcome: Met  Goal: Absence of New-Onset Illness or Injury  Outcome: Met  Goal: Optimized Coping Skills in Response to Life Stressors  Outcome: Met  Goal: Develops/Participates in Therapeutic [place] to Support Successful Transition  Outcome: Met  Goal: Rounds/Family Conference  Outcome: Met     Problem: Psychotic Signs/Symptoms  Goal: Improved Behavioral Control (Psychotic Signs/Symptoms)  Outcome: Met  Goal: Optimal Cognitive Function (Psychotic Signs/Symptoms)  Outcome: Met  Goal: Increased Participation and Engagement (Psychotic Signs/Symptoms)  Outcome: Met  Goal: Improved Mood Symptoms (Psychotic Signs/Symptoms)  Outcome: Met  Goal: Improved Psychomotor Symptoms (Psychotic Signs/Symptoms)  Outcome: Met  Goal: Decreased Sensory Symptoms (Psychotic Signs/Symptoms)  Outcome: Met  Goal: Improved Sleep (Psychotic Signs/Symptoms)  Outcome: Met  Goal: Enhanced Social, Occupational or Functional Skills (Psychotic Signs/Symptoms)  Outcome: Met     Problem: Excessive Substance Use  Goal: Optimized Energy Level (Excessive Substance Use)  Outcome: Met  Goal: Improved Behavioral Control (Excessive Substance Use)  Outcome: Met  Goal: Increased Participation and Engagement (Excessive Substance Use)  Outcome: Met  Goal: Improved Physiologic Symptoms (Excessive Substance Use)  Outcome: Met  Goal: Enhanced Social, Occupational or Functional Skills (Excessive Substance Use)  Outcome: Met   AAOx4. Flat affect. Mood improved. Denies suicidal ideations and hallucinations. In dayroom watching TV. Minimal interaction with staff and peers. Thought process clearer. No responding to internal stimuli noted. Answering questions appropriately. Compliant with medications and reports that they are effective. Reports that he is  Medication was refused for the reason of patient reported not taking on 4/14/2022. eating and sleeping well when he takes the Trazodone.  No agitation or aggression noted at this time. Continue with plan of care and q 15 minute safety checks.

## 2025-04-09 NOTE — PROGRESS NOTES
04/09/25 1400   [facility] Group Therapy   Group Name Therapeutic Recreation   Specific Interventions Skilled Activity Self-Expression   Participation Level Discharged Prior to Group

## 2025-07-26 ENCOUNTER — HOSPITAL ENCOUNTER (INPATIENT)
Facility: HOSPITAL | Age: 35
LOS: 6 days | Discharge: HOME | DRG: 885 | End: 2025-08-01
Attending: PSYCHIATRY & NEUROLOGY | Admitting: PSYCHIATRY & NEUROLOGY
Payer: MEDICARE

## 2025-07-26 ENCOUNTER — HOSPITAL ENCOUNTER (EMERGENCY)
Facility: HOSPITAL | Age: 35
Discharge: PSYCHIATRIC OR SUBSTANCE USE FACILITY | End: 2025-07-26
Attending: EMERGENCY MEDICINE
Payer: MEDICARE

## 2025-07-26 VITALS
RESPIRATION RATE: 18 BRPM | WEIGHT: 180 LBS | OXYGEN SATURATION: 98 % | SYSTOLIC BLOOD PRESSURE: 121 MMHG | HEART RATE: 75 BPM | BODY MASS INDEX: 23.1 KG/M2 | TEMPERATURE: 98 F | HEIGHT: 74 IN | DIASTOLIC BLOOD PRESSURE: 74 MMHG

## 2025-07-26 DIAGNOSIS — F23 ACUTE PSYCHOSIS: Primary | ICD-10-CM

## 2025-07-26 DIAGNOSIS — F29 PSYCHOSIS: ICD-10-CM

## 2025-07-26 LAB
ACCEPTIBLE SP GR UR QL: 1.02 (ref 1–1.03)
ALBUMIN SERPL-MCNC: 4.5 G/DL (ref 3.5–5)
ALBUMIN/GLOB SERPL: 1.2 RATIO (ref 1.1–2)
ALP SERPL-CCNC: 60 UNIT/L (ref 40–150)
ALT SERPL-CCNC: 16 UNIT/L (ref 0–55)
AMPHET UR QL SCN: NEGATIVE
ANION GAP SERPL CALC-SCNC: 11 MEQ/L
APAP SERPL-MCNC: <3 UG/ML (ref 10–30)
AST SERPL-CCNC: 17 UNIT/L (ref 11–45)
BARBITURATE SCN PRESENT UR: NEGATIVE
BASOPHILS # BLD AUTO: 0.02 X10(3)/MCL
BASOPHILS NFR BLD AUTO: 0.2 %
BENZODIAZ UR QL SCN: NEGATIVE
BILIRUB SERPL-MCNC: 0.8 MG/DL
BILIRUB UR QL STRIP.AUTO: NEGATIVE
BUN SERPL-MCNC: 12.1 MG/DL (ref 8.9–20.6)
CALCIUM SERPL-MCNC: 9.7 MG/DL (ref 8.4–10.2)
CANNABINOIDS UR QL SCN: POSITIVE
CHLORIDE SERPL-SCNC: 106 MMOL/L (ref 98–107)
CLARITY UR: CLEAR
CO2 SERPL-SCNC: 26 MMOL/L (ref 22–29)
COCAINE UR QL SCN: NEGATIVE
COLOR UR AUTO: YELLOW
CREAT SERPL-MCNC: 0.9 MG/DL (ref 0.72–1.25)
CREAT/UREA NIT SERPL: 13
EOSINOPHIL # BLD AUTO: 0.06 X10(3)/MCL (ref 0–0.9)
EOSINOPHIL NFR BLD AUTO: 0.7 %
ERYTHROCYTE [DISTWIDTH] IN BLOOD BY AUTOMATED COUNT: 12.2 % (ref 11.5–17)
ETHANOL SERPL-MCNC: <10 MG/DL
FENTANYL UR QL SCN: NEGATIVE
GFR SERPLBLD CREATININE-BSD FMLA CKD-EPI: >60 ML/MIN/1.73/M2
GLOBULIN SER-MCNC: 3.9 GM/DL (ref 2.4–3.5)
GLUCOSE SERPL-MCNC: 134 MG/DL (ref 74–100)
GLUCOSE UR QL STRIP: NEGATIVE
HCT VFR BLD AUTO: 41.1 % (ref 42–52)
HGB BLD-MCNC: 13.9 G/DL (ref 14–18)
HGB UR QL STRIP: NEGATIVE
IMM GRANULOCYTES # BLD AUTO: 0.02 X10(3)/MCL (ref 0–0.04)
IMM GRANULOCYTES NFR BLD AUTO: 0.2 %
KETONES UR QL STRIP: NEGATIVE
LEUKOCYTE ESTERASE UR QL STRIP: NEGATIVE
LYMPHOCYTES # BLD AUTO: 2.39 X10(3)/MCL (ref 0.6–4.6)
LYMPHOCYTES NFR BLD AUTO: 26 %
MCH RBC QN AUTO: 31.7 PG (ref 27–31)
MCHC RBC AUTO-ENTMCNC: 33.8 G/DL (ref 33–36)
MCV RBC AUTO: 93.6 FL (ref 80–94)
MONOCYTES # BLD AUTO: 0.68 X10(3)/MCL (ref 0.1–1.3)
MONOCYTES NFR BLD AUTO: 7.4 %
NEUTROPHILS # BLD AUTO: 6.04 X10(3)/MCL (ref 2.1–9.2)
NEUTROPHILS NFR BLD AUTO: 65.5 %
NITRITE UR QL STRIP: NEGATIVE
OPIATES UR QL SCN: NEGATIVE
PCP UR QL: NEGATIVE
PH UR STRIP: 6 [PH]
PH UR: 6 [PH] (ref 3–11)
PLATELET # BLD AUTO: 246 X10(3)/MCL (ref 130–400)
PMV BLD AUTO: 9.2 FL (ref 7.4–10.4)
POTASSIUM SERPL-SCNC: 3.7 MMOL/L (ref 3.5–5.1)
PROT SERPL-MCNC: 8.4 GM/DL (ref 6.4–8.3)
PROT UR QL STRIP: NEGATIVE
RBC # BLD AUTO: 4.39 X10(6)/MCL (ref 4.7–6.1)
SODIUM SERPL-SCNC: 143 MMOL/L (ref 136–145)
SP GR UR STRIP.AUTO: 1.02 (ref 1–1.03)
UROBILINOGEN UR STRIP-ACNC: 1
WBC # BLD AUTO: 9.21 X10(3)/MCL (ref 4.5–11.5)

## 2025-07-26 PROCEDURE — 82077 ASSAY SPEC XCP UR&BREATH IA: CPT | Performed by: EMERGENCY MEDICINE

## 2025-07-26 PROCEDURE — 25000003 PHARM REV CODE 250: Performed by: PSYCHIATRY & NEUROLOGY

## 2025-07-26 PROCEDURE — 80053 COMPREHEN METABOLIC PANEL: CPT | Performed by: EMERGENCY MEDICINE

## 2025-07-26 PROCEDURE — 80143 DRUG ASSAY ACETAMINOPHEN: CPT | Performed by: EMERGENCY MEDICINE

## 2025-07-26 PROCEDURE — 11400000 HC PSYCH PRIVATE ROOM

## 2025-07-26 PROCEDURE — 99285 EMERGENCY DEPT VISIT HI MDM: CPT

## 2025-07-26 PROCEDURE — 80307 DRUG TEST PRSMV CHEM ANLYZR: CPT | Performed by: EMERGENCY MEDICINE

## 2025-07-26 PROCEDURE — 85025 COMPLETE CBC W/AUTO DIFF WBC: CPT | Performed by: EMERGENCY MEDICINE

## 2025-07-26 PROCEDURE — 81003 URINALYSIS AUTO W/O SCOPE: CPT | Performed by: EMERGENCY MEDICINE

## 2025-07-26 RX ORDER — HALOPERIDOL LACTATE 5 MG/ML
5 INJECTION, SOLUTION INTRAMUSCULAR EVERY 4 HOURS PRN
Status: DISCONTINUED | OUTPATIENT
Start: 2025-07-26 | End: 2025-08-01 | Stop reason: HOSPADM

## 2025-07-26 RX ORDER — LORAZEPAM 1 MG/1
2 TABLET ORAL EVERY 4 HOURS PRN
Status: DISCONTINUED | OUTPATIENT
Start: 2025-07-26 | End: 2025-08-01 | Stop reason: HOSPADM

## 2025-07-26 RX ORDER — HALOPERIDOL 5 MG/1
5 TABLET ORAL EVERY 4 HOURS PRN
Status: DISCONTINUED | OUTPATIENT
Start: 2025-07-26 | End: 2025-08-01 | Stop reason: HOSPADM

## 2025-07-26 RX ORDER — HYDROXYZINE HYDROCHLORIDE 50 MG/1
50 TABLET, FILM COATED ORAL EVERY 4 HOURS PRN
Status: DISCONTINUED | OUTPATIENT
Start: 2025-07-26 | End: 2025-08-01 | Stop reason: HOSPADM

## 2025-07-26 RX ORDER — TALC
6 POWDER (GRAM) TOPICAL NIGHTLY PRN
Status: DISCONTINUED | OUTPATIENT
Start: 2025-07-26 | End: 2025-08-01 | Stop reason: HOSPADM

## 2025-07-26 RX ORDER — TRAZODONE HYDROCHLORIDE 100 MG/1
100 TABLET ORAL NIGHTLY PRN
Status: DISCONTINUED | OUTPATIENT
Start: 2025-07-26 | End: 2025-08-01 | Stop reason: HOSPADM

## 2025-07-26 RX ORDER — LORAZEPAM 2 MG/ML
2 INJECTION INTRAMUSCULAR EVERY 4 HOURS PRN
Status: DISCONTINUED | OUTPATIENT
Start: 2025-07-26 | End: 2025-08-01 | Stop reason: HOSPADM

## 2025-07-26 RX ORDER — DIPHENHYDRAMINE HYDROCHLORIDE 50 MG/ML
50 INJECTION, SOLUTION INTRAMUSCULAR; INTRAVENOUS EVERY 4 HOURS PRN
Status: DISCONTINUED | OUTPATIENT
Start: 2025-07-26 | End: 2025-08-01 | Stop reason: HOSPADM

## 2025-07-26 RX ORDER — ONDANSETRON 4 MG/1
4 TABLET, ORALLY DISINTEGRATING ORAL EVERY 8 HOURS PRN
Status: DISCONTINUED | OUTPATIENT
Start: 2025-07-26 | End: 2025-08-01 | Stop reason: HOSPADM

## 2025-07-26 RX ORDER — ACETAMINOPHEN 325 MG/1
650 TABLET ORAL EVERY 6 HOURS PRN
Status: DISCONTINUED | OUTPATIENT
Start: 2025-07-26 | End: 2025-08-01 | Stop reason: HOSPADM

## 2025-07-26 RX ORDER — CALCIUM CARBONATE 200(500)MG
1000 TABLET,CHEWABLE ORAL EVERY 8 HOURS PRN
Status: DISCONTINUED | OUTPATIENT
Start: 2025-07-26 | End: 2025-08-01 | Stop reason: HOSPADM

## 2025-07-26 RX ORDER — GUAIFENESIN 100 MG/5ML
200 LIQUID ORAL EVERY 4 HOURS PRN
Status: DISCONTINUED | OUTPATIENT
Start: 2025-07-26 | End: 2025-08-01 | Stop reason: HOSPADM

## 2025-07-26 RX ORDER — DIPHENHYDRAMINE HCL 25 MG
50 CAPSULE ORAL EVERY 4 HOURS PRN
Status: DISCONTINUED | OUTPATIENT
Start: 2025-07-26 | End: 2025-08-01 | Stop reason: HOSPADM

## 2025-07-26 RX ORDER — IBUPROFEN 200 MG
1 TABLET ORAL DAILY
Status: DISCONTINUED | OUTPATIENT
Start: 2025-07-27 | End: 2025-08-01 | Stop reason: HOSPADM

## 2025-07-26 RX ADMIN — TRAZODONE HYDROCHLORIDE 100 MG: 100 TABLET ORAL at 08:07

## 2025-07-26 RX ADMIN — Medication 6 MG: at 08:07

## 2025-07-26 RX ADMIN — LORAZEPAM 2 MG: 1 TABLET ORAL at 10:07

## 2025-07-26 RX ADMIN — DIPHENHYDRAMINE HYDROCHLORIDE 50 MG: 25 CAPSULE ORAL at 10:07

## 2025-07-26 RX ADMIN — HALOPERIDOL 5 MG: 5 TABLET ORAL at 10:07

## 2025-07-26 NOTE — ED NOTES
Per Vanesa @ [employer] [facility], pt accepted to [facility] by Juan Almonte NP- phone number for report: [phone]

## 2025-07-26 NOTE — ED NOTES
Dr. Bahena talking with parents. Hx of bipolar. Unsure of med compliance or drug use. Has been pacing a lot. Not making sense.

## 2025-07-26 NOTE — ED PROVIDER NOTES
NAME:  Oscar Canseco  CSN:     [id number]  MRN:    [id number]  ADMIT DATE: 7/26/2025        eMERGENCY dEPARTMENT eNCOUnter    CHIEF COMPLAINT    Chief Complaint   Patient presents with    Psychiatric Evaluation     Pt with father, pt unable to answer questions, father states pt has been is been unable to sleep for 3 days, increased stress. Per father pt has been talking to himself, and seeing things, pt denies HI, SI, or AVH. Per father pt has hx of bipolor with schizophrenia.       HPI      Oscar Canseco is a 34 y.o. male who presents to the ED for psychiatric evaluation.  Patient has a history of bipolar disorder.  He is brought in by his parents who states the patient has become agitated, experiencing insomnia and delusional thought and behavior.          ALLERGIES    Review of patient's allergies indicates:  No Known Allergies    PAST MEDICAL HISTORY  Past Medical History:   Diagnosis Date    Bipolar disorder     History of psychiatric hospitalization     Hx of psychiatric care     Therapy        SURGICAL HISTORY    History reviewed. No pertinent surgical history.    SOCIAL HISTORY    Social History     Socioeconomic History    Marital status: Single   Tobacco Use    Smoking status: Every Day     Current packs/day: 0.50     Types: Cigarettes    Smokeless tobacco: Never   Substance and Sexual Activity    Alcohol use: Not Currently    Drug use: Not Currently     Types: Opium, Marijuana   Other Topics Concern    Patient feels they ought to cut down on drinking/drug use No    Patient annoyed by others criticizing their drinking/drug use No    Patient has felt bad or guilty about drinking/drug use No    Patient has had a drink/used drugs as an eye opener in the AM No       FAMILY HISTORY    No family history on file.    REVIEW OF SYSTEMS   ROS  All Systems otherwise negative except as noted in the History of Present Illness.        PHYSICAL EXAM    Reviewed Triage Note  VITAL SIGNS:   ED Triage Vitals [07/26/25  "1401]   Encounter Vitals Group      BP (!) 155/74      Girls Systolic BP Percentile       Girls Diastolic BP Percentile       Boys Systolic BP Percentile       Boys Diastolic BP Percentile       Pulse 84      Resp 18      Temp 98.6 °F (37 °C)      Temp Source Oral      SpO2 100 %      Weight 180 lb      Height 6' 2"      Head Circumference       Peak Flow       Pain Score       Pain Loc       Pain Education       Exclude from Growth Chart        Patient Vitals for the past 24 hrs:   BP Temp Temp src Pulse Resp SpO2 Height Weight   07/26/25 1401 (!) 155/74 98.6 °F (37 °C) Oral 84 18 100 % 6' 2" (1.88 m) 81.6 kg (180 lb)           Physical Exam    Constitutional:  Well-developed, well-nourished. No acute distress  HENT:  Normocephalic, atraumatic.  Eyes:  EOMI. Conjunctiva normal without discharge.   Neck: Normal range of motion.No stridor. No meningismus.   Respiratory:  Normal breath sounds bilaterally.  No respiratory distress, retractions, or conversational dyspnea. No wheezing. No rhonchi. No rales.   Cardiovascular:  Normal heart rate. Normal rhythm. No pitting lower extremity edema.   GI:  Abdomen soft, non-distended, non-tender. Normal bowel sounds. No guarding, rigidity or rebound.    : No CVA tenderness.   Musculoskeletal:  No gross deformity or limited range of motion of all major joints. No palpable bony deformity. No tenderness to palpation.  Integument:  Warm and dry. No rash.  Neurologic:  Normal motor function. Normal sensory function. No focal deficits noted. Alert and Interactive.  Psychiatric:  Agitated and uncooperative, delusional disorganized thought         LABS  Pertinent labs reviewed. (See chart for details)   Labs Reviewed   COMPREHENSIVE METABOLIC PANEL - Abnormal       Result Value    Sodium 143      Potassium 3.7      Chloride 106      CO2 26      Glucose 134 (*)     Blood Urea Nitrogen 12.1      Creatinine 0.90      Calcium 9.7      Protein Total 8.4 (*)     Albumin 4.5      Globulin " 3.9 (*)     Albumin/Globulin Ratio 1.2      Bilirubin Total 0.8      ALP 60      ALT 16      AST 17      eGFR >60      Anion Gap 11.0      BUN/Creatinine Ratio 13     DRUG SCREEN, URINE (BEAKER) - Abnormal    Amphetamines, Urine Negative      Barbiturates, Urine Negative      Benzodiazepine, Urine Negative      Cannabinoids, Urine Positive (*)     Cocaine, Urine Negative      Opiates, Urine Negative      Phencyclidine, Urine Negative      Fentanyl, Urine Negative      pH, Urine 6.0      Specific Gravity, Urine Auto 1.025      Narrative:     Cut off concentrations:    Amphetamines - 1000 ng/ml  Barbiturates - 200 ng/ml  Benzodiazepine - 200 ng/ml  Cannabinoids (THC) - 50 ng/ml  Cocaine - 300 ng/ml  Fentanyl - 1.0 ng/ml  MDMA - 500 ng/ml  Opiates - 300 ng/ml   Phencyclidine (PCP) - 25 ng/ml    Specimen submitted for drug analysis and tested for pH and specific gravity in order to evaluate sample integrity. Suspect tampering if specific gravity is <1.003 and/or pH is not within the range of 4.5 - 8.0  False negatives may result form substances such as bleach added to urine.  False positives may result for the presence of a substance with similar chemical structure to the drug or its metabolite.    This test provides only a PRELIMINARY analytical test result. A more specific alternate chemical method must be used in order to obtain a confirmed analytical result. Gas chromatography/mass spectrometry (GC/MS) is the preferred confirmatory method. Other chemical confirmation methods are available. Clinical consideration and professional judgement should be applied to any drug of abuse test result, particularly when preliminary positive results are used.    Positive results will be confirmed only at the physicians request. Unconfirmed screening results are to be used only for medical purposes (treatment).        ACETAMINOPHEN LEVEL - Abnormal    Acetaminophen Level <3.0 (*)    CBC WITH DIFFERENTIAL - Abnormal    WBC 9.21       RBC 4.39 (*)     Hgb 13.9 (*)     Hct 41.1 (*)     MCV 93.6      MCH 31.7 (*)     MCHC 33.8      RDW 12.2      Platelet 246      MPV 9.2      Neut % 65.5      Lymph % 26.0      Mono % 7.4      Eos % 0.7      Basophil % 0.2      Imm Grans % 0.2      Neut # 6.04      Lymph # 2.39      Mono # 0.68      Eos # 0.06      Baso # 0.02      Imm Gran # 0.02     URINALYSIS, REFLEX TO URINE CULTURE - Normal    Color, UA Yellow      Appearance, UA Clear      Specific Gravity, UA 1.025      pH, UA 6.0      Protein, UA Negative      Glucose, UA Negative      Ketones, UA Negative      Blood, UA Negative      Bilirubin, UA Negative      Urobilinogen, UA 1.0      Nitrites, UA Negative      Leukocyte Esterase, UA Negative     ALCOHOL,MEDICAL (ETHANOL) - Normal    Ethanol Level <10.0     CBC W/ AUTO DIFFERENTIAL    Narrative:     The following orders were created for panel order CBC auto differential.  Procedure                               Abnormality         Status                     ---------                               -----------         ------                     CBC with Differential[[id number]]       Abnormal            Final result                 Please view results for these tests on the individual orders.         RADIOLOGY    Imaging Results    None         PROCEDURES    Procedures      EKG     Interpreted by ERP:          ED COURSE & MEDICAL DECISION MAKING    Pertinent & Imaging studies reviewed. (See chart for details and specific orders.)        Medications - No data to display       Patient appears to be gravely disabled due to acute psychosis therefore pec was filed.  Patient is medically cleared for psychiatric transfer       DISPOSITION  Patient transferred to [facility] in stable condition at No discharge date for patient encounter.    FINAL IMPRESSION    1. Acute psychosis                   DISCLAIMER: This note was prepared with Dragon NaturallySpeaking voice recognition transcription software. Garbled syntax,  mangled pronouns, and other bizarre constructions may be attributed to that software system.      Kai Bahena MD  07/26/2025  4:08 PM           Kai Bahena MD  07/26/25 4818

## 2025-07-27 PROCEDURE — S4991 NICOTINE PATCH NONLEGEND: HCPCS | Performed by: PSYCHIATRY & NEUROLOGY

## 2025-07-27 PROCEDURE — 25000003 PHARM REV CODE 250: Performed by: PSYCHIATRY & NEUROLOGY

## 2025-07-27 PROCEDURE — 25000003 PHARM REV CODE 250

## 2025-07-27 PROCEDURE — 11400000 HC PSYCH PRIVATE ROOM

## 2025-07-27 RX ORDER — OLANZAPINE 5 MG/1
10 TABLET, FILM COATED ORAL NIGHTLY
Status: DISCONTINUED | OUTPATIENT
Start: 2025-07-27 | End: 2025-08-01 | Stop reason: HOSPADM

## 2025-07-27 RX ADMIN — NICOTINE 1 PATCH: 21 PATCH, EXTENDED RELEASE TRANSDERMAL at 09:07

## 2025-07-27 RX ADMIN — DIPHENHYDRAMINE HYDROCHLORIDE 50 MG: 25 CAPSULE ORAL at 03:07

## 2025-07-27 RX ADMIN — OLANZAPINE 10 MG: 5 TABLET, FILM COATED ORAL at 09:07

## 2025-07-27 RX ADMIN — HALOPERIDOL 5 MG: 5 TABLET ORAL at 03:07

## 2025-07-27 RX ADMIN — LORAZEPAM 2 MG: 1 TABLET ORAL at 03:07
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
## 2025-07-27 NOTE — PLAN OF CARE
Problem: Adult Behavioral Health Plan of Care  Goal: Plan of Care Review  Outcome: Ongoing  Flowsheets (Taken 7/27/2025 0414)  Patient Agreement with Plan of Care: refuses to participate  Plan of Care Reviewed With: patient  Goal: Patient-Specific Goal (Individualization)  Outcome: Ongoing  Flowsheets (Taken 7/27/2025 0414)  Patient Personal Strengths: family/social support  Patient Vulnerabilities:   lacks insight into illness   poor impulse control   substance abuse/addiction   limited social skills   history of unsuccessful treatment  Goal: Adheres to Safety Considerations for Self and Others  Outcome: Ongoing  Flowsheets (Taken 7/27/2025 0414)  Adheres to Safety Considerations for Self and Others: unable to achieve outcome  Intervention: Develop and Maintain Individualized Safety Plan  Flowsheets (Taken 7/27/2025 0414)  Safety Measures:   self-directed behavior promoted   suicide check-in completed   suicide assessment completed   safety plan reviewed   safety rounds completed   monitored by video  Goal: Absence of New-Onset Illness or Injury  Outcome: Ongoing  Intervention: Identify and Manage Fall Risk  Flowsheets (Taken 7/27/2025 0414)  Safety Measures:   self-directed behavior promoted   suicide check-in completed   suicide assessment completed   safety plan reviewed   safety rounds completed   monitored by video  Intervention: Prevent Skin Injury  Flowsheets (Taken 7/27/2025 0414)  Device Skin Pressure Protection: adhesive use limited  Intervention: Prevent VTE (Venous Thromboembolism)  Flowsheets (Taken 7/27/2025 0414)  VTE Prevention/Management: fluids promoted  Intervention: Prevent Infection  Flowsheets (Taken 7/27/2025 0414)  Infection Prevention: hand hygiene promoted  Goal: Optimized Coping Skills in Response to Life Stressors  Outcome: Ongoing  Flowsheets (Taken 7/27/2025 0414)  Optimized Coping Skills in Response to Life Stressors: unable to achieve outcome  Intervention: Promote Effective Coping  Strategies  Flowsheets (Taken 7/27/2025 0414)  Supportive Measures:   active listening utilized   positive reinforcement provided   self-care encouraged   self-reflection promoted   self-responsibility promoted   verbalization of feelings encouraged  Goal: Develops/Participates in Therapeutic [place] to Support Successful Transition  Outcome: Ongoing  Flowsheets (Taken 7/27/2025 0414)  Develops/Participates in Therapeutic [place] to Support Successful Transition: unable to achieve outcome  Intervention: Foster Therapeutic [place]  Flowsheets (Taken 7/27/2025 0414)  Trust Relationship/Rapport: care explained  Goal: Rounds/Family Conference  Outcome: Ongoing     Problem: Psychotic Signs/Symptoms  Goal: Improved Behavioral Control (Psychotic Signs/Symptoms)  Outcome: Ongoing  Flowsheets (Taken 7/27/2025 0414)  Mutually Determined Action Steps (Improved Behavioral Control):   identifies symptoms triggers   verbalizes gratifying activity   verbalizes personal treatment goal  Intervention: Manage Behavior  Flowsheets (Taken 7/27/2025 0414)  De-Escalation Techniques:   appropriate behavior reinforced   stimulation decreased   verbally redirected   medication administered  Goal: Optimal Cognitive Function (Psychotic Signs/Symptoms)  Outcome: Ongoing  Flowsheets (Taken 7/27/2025 0414)  Mutually Determined Action Steps (Optimal Cognitive Function):   participates in attention training   follows step-by-step instructions   participates in problem resolution  Intervention: Support and Promote Cognitive Ability  Flowsheets (Taken 7/27/2025 0414)  Trust Relationship/Rapport: care explained  Goal: Increased Participation and Engagement (Psychotic Signs/Symptoms)  Outcome: Ongoing  Flowsheets (Taken 7/27/2025 0414)  Mutually Determined Action Steps (Increased Participation and Engagement):   identifies symptoms triggers   verbalizes gratifying activity   verbalizes personal treatment goal  Intervention: Facilitate Participation  and Engagement  Flowsheets (Taken 7/27/2025 0414)  Supportive Measures:   active listening utilized   positive reinforcement provided   self-care encouraged   self-reflection promoted   self-responsibility promoted   verbalization of feelings encouraged  Diversional Activity:   art work   play   television  Goal: Improved Mood Symptoms (Psychotic Signs/Symptoms)  Outcome: Ongoing  Flowsheets (Taken 7/27/2025 0414)  Mutually Determined Action Steps (Improved Mood Symptoms):   acknowledges progress   verbalizes increased insight  Intervention: Optimize Emotion and Mood  Flowsheets (Taken 7/27/2025 0414)  Supportive Measures:   active listening utilized   positive reinforcement provided   self-care encouraged   self-reflection promoted   self-responsibility promoted   verbalization of feelings encouraged  Diversional Activity:   art work   play   television  Goal: Improved Psychomotor Symptoms (Psychotic Signs/Symptoms)  Outcome: Ongoing  Flowsheets (Taken 7/27/2025 0414)  Mutually Determined Action Steps (Improved Psychomotor Symptoms):   adheres to medication regimen   exhibits decrease in agitation  Intervention: Manage Psychomotor Movement  Flowsheets (Taken 7/27/2025 0414)  Activity (Behavioral Health): up ad duncan  Patient Performed Hygiene: patient refused  Diversional Activity:   art work   play   television  Goal: Decreased Sensory Symptoms (Psychotic Signs/Symptoms)  Outcome: Ongoing  Flowsheets (Taken 7/27/2025 0414)  Mutually Determined Action Steps (Decreased Sensory Symptoms): adheres to medication regimen  Intervention: Minimize and Manage Sensory Impairment  Flowsheets (Taken 7/27/2025 0414)  Sensory Stimulation Regulation:   auditory stimulation minimized   quiet environment promoted   visual stimulation minimized  Goal: Improved Sleep (Psychotic Signs/Symptoms)  Outcome: Ongoing  Flowsheets (Taken 7/27/2025 0414)  Mutually Determined Action Steps (Improved Sleep): sleeps 4-6 hours at  night  Intervention: Promote Healthy Sleep Hygiene  Flowsheets (Taken 7/27/2025 0414)  Sleep Hygiene Promotion:   awakenings minimized   regular sleep pattern promoted  Goal: Enhanced Social, Occupational or Functional Skills (Psychotic Signs/Symptoms)  Outcome: Ongoing  Flowsheets (Taken 7/27/2025 0414)  Mutually Determined Action Steps (Enhanced Social, Occupational or Functional Skills):   participates in social skills training   identifies personal strengths  Intervention: Promote Social, Occupational and Functional Ability  Flowsheets (Taken 7/27/2025 0414)  Trust Relationship/Rapport: care explained  Social Functional Ability Promotion: autonomy promoted   Patient pacing up and down hallway, holding a cup to his ear and responding to internal stimuli. Following staff around with poor boundaries and standing uncomfortably close to staff. Somewhat agitated.  Not responding to redirection. Will administer meds for agitation if agitation level increases.

## 2025-07-27 NOTE — NURSING
Patient is withdrawn and appears calmer. He appears at times responding to internal stimuli. No complaints voiced at present. No distress noted. Staff will continue to monitor for safety.

## 2025-07-27 NOTE — NURSING
"0337 pt condition had remained unchanged, continues to pace and respond to internal stimuli, requires frequent redirection. Offered pt medication to assist with relaxation, pt stated "I'm playing the EMT simulator," while staring at a high point on the wall. Administered PRN ativan 2 mg, haldol 5 mg and benadryl 50 mg PO at this time.     0400 pt resting quietly in bed with eyes closed. Respirations even/unlabored. No signs of distress or discomfort noted at this time.   "

## 2025-07-27 NOTE — PLAN OF CARE
Problem: Adult Behavioral Health Plan of Care  Goal: Plan of Care Review  Outcome: Progressing  Flowsheets (Taken 7/27/2025 1109)  Patient Agreement with Plan of Care: agrees  Plan of Care Reviewed With: patient  Goal: Patient-Specific Goal (Individualization)  Outcome: Progressing  Goal: Adheres to Safety Considerations for Self and Others  Outcome: Progressing  Goal: Absence of New-Onset Illness or Injury  Outcome: Progressing  Goal: Optimized Coping Skills in Response to Life Stressors  Outcome: Progressing  Flowsheets (Taken 7/27/2025 1109)  Optimized Coping Skills in Response to Life Stressors: making progress toward outcome  Goal: Develops/Participates in Therapeutic [place] to Support Successful Transition  Outcome: Progressing  Goal: Rounds/Family Conference  Outcome: Progressing     Problem: Psychotic Signs/Symptoms  Goal: Improved Behavioral Control (Psychotic Signs/Symptoms)  Outcome: Progressing  Goal: Optimal Cognitive Function (Psychotic Signs/Symptoms)  Outcome: Progressing  Goal: Increased Participation and Engagement (Psychotic Signs/Symptoms)  Outcome: Progressing  Goal: Improved Mood Symptoms (Psychotic Signs/Symptoms)  Outcome: Progressing  Goal: Improved Psychomotor Symptoms (Psychotic Signs/Symptoms)  Outcome: Progressing  Goal: Decreased Sensory Symptoms (Psychotic Signs/Symptoms)  Outcome: Progressing  Goal: Improved Sleep (Psychotic Signs/Symptoms)  Outcome: Progressing  Goal: Enhanced Social, Occupational or Functional Skills (Psychotic Signs/Symptoms)  Outcome: Progressing     Problem: Violence Risk or Actual  Goal: Anger and Impulse Control  Outcome: Progressing

## 2025-07-27 NOTE — H&P
[facility] - Behavioral Health Unit  History & Physical    Subjective:      Chief Complaint/Reason for Admission:     Oscar Canseco is a 34 y.o. male admitted by PEC from ER.   From ER note:     Pt with father, pt unable to answer questions, father states pt has been is been unable to sleep for 3 days, increased stress. Per father pt has been talking to himself, and seeing things, pt denies HI, SI, or AVH. Per father pt has hx of bipolor with schizophrenia.     Currently, he is sleepy, states he hasn't slept much, denies any medical problems .     Problem List[1]   Past Medical History:   Diagnosis Date    Bipolar disorder     History of psychiatric hospitalization     Hx of psychiatric care     Therapy      No past surgical history on file.  No family history on file.  Social History[2]    PTA Medications   Medication Sig    OLANZapine (ZYPREXA) 10 MG tablet Take 1 tablet (10 mg total) by mouth every evening.     Review of patient's allergies indicates:  No Known Allergies     Review of Systems   Constitutional: Negative.    HENT: Negative.     Eyes: Negative.    Respiratory: Negative.     Cardiovascular: Negative.    Gastrointestinal: Negative.    Genitourinary: Negative.    Musculoskeletal: Negative.    Skin: Negative.    Neurological: Negative.        Objective:      Vital Signs (Most Recent)  Body mass index is 24.7 kg/m².   Temp: 97.7 °F (36.5 °C) (07/27/25 1122)  Pulse: 83 (07/27/25 1122)  Resp: 18 (07/27/25 1122)  BP: 138/80 (07/27/25 1122)  SpO2: 99 % (07/27/25 1100)    Vital Signs Range (Last 24H):  Temp:  [97.7 °F (36.5 °C)-100.2 °F (37.9 °C)]   Pulse:  [63-84]   Resp:  [16-18]   BP: (121-155)/(74-91)   SpO2:  [98 %-100 %]     Physical Exam  Vitals reviewed.   Constitutional:       Appearance: Normal appearance.   HENT:      Head: Normocephalic.      Right Ear: External ear normal.      Left Ear: External ear normal.      Nose: No rhinorrhea.      Mouth/Throat:      Mouth: Mucous  membranes are moist.      Pharynx: No posterior oropharyngeal erythema.   Eyes:      General: No visual field deficit or scleral icterus.     Conjunctiva/sclera: Conjunctivae normal.      Pupils: Pupils are equal, round, and reactive to light.   Cardiovascular:      Rate and Rhythm: Normal rate and regular rhythm.      Heart sounds: No murmur heard.  Pulmonary:      Effort: Pulmonary effort is normal.      Breath sounds: Normal breath sounds.   Abdominal:      General: There is no distension.      Palpations: Abdomen is soft.      Tenderness: There is no abdominal tenderness.   Musculoskeletal:         General: No deformity.      Right lower leg: No edema.      Left lower leg: No edema.   Lymphadenopathy:      Cervical: No cervical adenopathy.   Skin:     Findings: No rash.   Neurological:      Mental Status: He is alert.      Cranial Nerves: No dysarthria or facial asymmetry.      Sensory: No sensory deficit.      Motor: No tremor.      Gait: Gait is intact.      Deep Tendon Reflexes:      Reflex Scores:       Patellar reflexes are 2+ on the right side and 2+ on the left side.     Comments: II- WNL-normal pupillary reflex bilaterally  III- WNL-extraocular muscles intact, no ptosis  WQ-MQE-spmhurerpef movements intact  V-WNL-facial sensation intact bilaterally  VI-WNL-extraocular muscles intact no ptosis  VII- WNL-no facial asymmetry  VIII-WNL-hearing grossly intact bilaterally  IX- WNL-symmetrical elevation of palate, no uvula deviation  X-WNL--symmetrical elevation of palate, no uvula deviation  XI-WNL-normal sternocleidomastoid strength bilaterally  XII- WNL-no tongue deviation, no tongue atrophy           Current Medications[3]     Data Review:    Recent Results (from the past 48 hours)   Urinalysis, Reflex to Urine Culture Urine, Clean Catch    Collection Time: 07/26/25  2:54 PM    Specimen: Urine   Result Value Ref Range    Color, UA Yellow Yellow, Light-Yellow, Dark Yellow, Lesly, Straw    Appearance, UA  Clear Clear    Specific Gravity, UA 1.025 1.005 - 1.030    pH, UA 6.0 5.0 - 8.5    Protein, UA Negative Negative    Glucose, UA Negative Negative, Normal    Ketones, UA Negative Negative    Blood, UA Negative Negative    Bilirubin, UA Negative Negative    Urobilinogen, UA 1.0 0.2, 1.0, Normal    Nitrites, UA Negative Negative    Leukocyte Esterase, UA Negative Negative   Drug Screen, Urine    Collection Time: 07/26/25  2:54 PM   Result Value Ref Range    Amphetamines, Urine Negative Negative    Barbiturates, Urine Negative Negative    Benzodiazepine, Urine Negative Negative    Cannabinoids, Urine Positive (A) Negative    Cocaine, Urine Negative Negative    Opiates, Urine Negative Negative    Phencyclidine, Urine Negative Negative    Fentanyl, Urine Negative Negative    pH, Urine 6.0 3.0 - 11.0    Specific Gravity, Urine Auto 1.025 1.001 - 1.035   Comprehensive metabolic panel    Collection Time: 07/26/25  3:04 PM   Result Value Ref Range    Sodium 143 136 - 145 mmol/L    Potassium 3.7 3.5 - 5.1 mmol/L    Chloride 106 98 - 107 mmol/L    CO2 26 22 - 29 mmol/L    Glucose 134 (H) 74 - 100 mg/dL    Blood Urea Nitrogen 12.1 8.9 - 20.6 mg/dL    Creatinine 0.90 0.72 - 1.25 mg/dL    Calcium 9.7 8.4 - 10.2 mg/dL    Protein Total 8.4 (H) 6.4 - 8.3 gm/dL    Albumin 4.5 3.5 - 5.0 g/dL    Globulin 3.9 (H) 2.4 - 3.5 gm/dL    Albumin/Globulin Ratio 1.2 1.1 - 2.0 ratio    Bilirubin Total 0.8 <=1.5 mg/dL    ALP 60 40 - 150 unit/L    ALT 16 0 - 55 unit/L    AST 17 11 - 45 unit/L    eGFR >60 mL/min/1.73/m2    Anion Gap 11.0 mEq/L    BUN/Creatinine Ratio 13    Ethanol    Collection Time: 07/26/25  3:04 PM   Result Value Ref Range    Ethanol Level <10.0 <=10.0 mg/dL   Acetaminophen level    Collection Time: 07/26/25  3:04 PM   Result Value Ref Range    Acetaminophen Level <3.0 (L) 10.0 - 30.0 ug/ml   CBC with Differential    Collection Time: 07/26/25  3:04 PM   Result Value Ref Range    WBC 9.21 4.50 - 11.50 x10(3)/mcL    RBC 4.39 (L)  4.70 - 6.10 x10(6)/mcL    Hgb 13.9 (L) 14.0 - 18.0 g/dL    Hct 41.1 (L) 42.0 - 52.0 %    MCV 93.6 80.0 - 94.0 fL    MCH 31.7 (H) 27.0 - 31.0 pg    MCHC 33.8 33.0 - 36.0 g/dL    RDW 12.2 11.5 - 17.0 %    Platelet 246 130 - 400 x10(3)/mcL    MPV 9.2 7.4 - 10.4 fL    Neut % 65.5 %    Lymph % 26.0 %    Mono % 7.4 %    Eos % 0.7 %    Basophil % 0.2 %    Imm Grans % 0.2 %    Neut # 6.04 2.1 - 9.2 x10(3)/mcL    Lymph # 2.39 0.6 - 4.6 x10(3)/mcL    Mono # 0.68 0.1 - 1.3 x10(3)/mcL    Eos # 0.06 0 - 0.9 x10(3)/mcL    Baso # 0.02 <=0.2 x10(3)/mcL    Imm Gran # 0.02 0.00 - 0.04 x10(3)/mcL        No results found.       Assessment and Plan       Mood disorder - per psychiatry  Medically stable         [1]   Patient Active Problem List  Diagnosis    Bipolar I, most recent episode manic, severe with psychotic behavior    Cannabis dependence, continuous   [2]   Social History  Tobacco Use    Smoking status: Every Day     Current packs/day: 0.50     Types: Cigarettes    Smokeless tobacco: Never   Substance Use Topics    Alcohol use: Not Currently    Drug use: Not Currently     Types: Opium, Marijuana   [3]   Current Facility-Administered Medications:     acetaminophen tablet 650 mg, 650 mg, Oral, Q6H PRN, Wil Virgen MD    calcium carbonate 200 mg calcium (500 mg) chewable tablet 1,000 mg, 1,000 mg, Oral, Q8H PRN, Wil Virgen MD    haloperidoL tablet 5 mg, 5 mg, Oral, Q4H PRN, 5 mg at 07/27/25 0337 **AND** diphenhydrAMINE capsule 50 mg, 50 mg, Oral, Q4H PRN, 50 mg at 07/27/25 0337 **AND** LORazepam tablet 2 mg, 2 mg, Oral, Q4H PRN, 2 mg at 07/27/25 0338 **AND** haloperidol lactate injection 5 mg, 5 mg, Intramuscular, Q4H PRN **AND** diphenhydrAMINE injection 50 mg, 50 mg, Intramuscular, Q4H PRN **AND** LORazepam injection 2 mg, 2 mg, Intramuscular, Q4H PRN, Wil Virgen MD    guaiFENesin 100 mg/5 ml syrup 200 mg, 200 mg, Oral, Q4H PRN, Wil Virgen MD    hydrOXYzine tablet 50 mg, 50 mg,  Oral, Q4H PRN, Wil Virgen MD    melatonin tablet 6 mg, 6 mg, Oral, Nightly PRN, Wil Virgen MD, 6 mg at 07/26/25 2059    nicotine 21 mg/24 hr 1 patch, 1 patch, Transdermal, Daily, Wil Virgen MD, 1 patch at 07/27/25 0958    ondansetron disintegrating tablet 4 mg, 4 mg, Oral, Q8H PRN, Wil Virgen MD    traZODone tablet 100 mg, 100 mg, Oral, Nightly PRN, Wil Virgen MD, 100 mg at 07/26/25 2059

## 2025-07-27 NOTE — NURSING
2100 pt requested something to help him sleep. Administered PRN trazodone 100 mg and melatonin 6 mg at this time.     2200 pt pacing in hallway, mumbling garbled speech to self, requires frequent redirecting, invading personal space of staff and other patients.     2242 pt continues to pace and invade boundaries. Pt seen responding to internal stimuli and rubbing arms. Administered PRN PO ativan 2 mg, haldol 5 mg and benadryl 50 mg at this time.     2315 no change noted at this time.

## 2025-07-28 PROBLEM — F41.1 GENERALIZED ANXIETY DISORDER: Status: ACTIVE | Noted: 2025-07-28

## 2025-07-28 LAB
ALBUMIN SERPL-MCNC: 4.3 G/DL (ref 3.5–5)
ALBUMIN/GLOB SERPL: 1.3 RATIO (ref 1.1–2)
ALP SERPL-CCNC: 52 UNIT/L (ref 40–150)
ALT SERPL-CCNC: 16 UNIT/L (ref 0–55)
ANION GAP SERPL CALC-SCNC: 6 MEQ/L
AST SERPL-CCNC: 20 UNIT/L (ref 11–45)
BASOPHILS # BLD AUTO: 0.03 X10(3)/MCL
BASOPHILS NFR BLD AUTO: 0.4 %
BILIRUB SERPL-MCNC: 1.1 MG/DL
BUN SERPL-MCNC: 17.1 MG/DL (ref 8.9–20.6)
CALCIUM SERPL-MCNC: 9.4 MG/DL (ref 8.4–10.2)
CHLORIDE SERPL-SCNC: 109 MMOL/L (ref 98–107)
CHOLEST SERPL-MCNC: 194 MG/DL
CHOLEST/HDLC SERPL: 5 {RATIO} (ref 0–5)
CO2 SERPL-SCNC: 26 MMOL/L (ref 22–29)
CREAT SERPL-MCNC: 0.78 MG/DL (ref 0.72–1.25)
CREAT/UREA NIT SERPL: 22
EOSINOPHIL # BLD AUTO: 0.17 X10(3)/MCL (ref 0–0.9)
EOSINOPHIL NFR BLD AUTO: 2 %
ERYTHROCYTE [DISTWIDTH] IN BLOOD BY AUTOMATED COUNT: 12.5 % (ref 11.5–17)
EST. AVERAGE GLUCOSE BLD GHB EST-MCNC: 99.7 MG/DL
GFR SERPLBLD CREATININE-BSD FMLA CKD-EPI: >60 ML/MIN/1.73/M2
GLOBULIN SER-MCNC: 3.4 GM/DL (ref 2.4–3.5)
GLUCOSE SERPL-MCNC: 86 MG/DL (ref 74–100)
HBA1C MFR BLD: 5.1 %
HCT VFR BLD AUTO: 43.5 % (ref 42–52)
HDLC SERPL-MCNC: 43 MG/DL (ref 35–60)
HGB BLD-MCNC: 14.5 G/DL (ref 14–18)
IMM GRANULOCYTES # BLD AUTO: 0.03 X10(3)/MCL (ref 0–0.04)
IMM GRANULOCYTES NFR BLD AUTO: 0.4 %
LDLC SERPL CALC-MCNC: 137 MG/DL (ref 50–140)
LYMPHOCYTES # BLD AUTO: 2.36 X10(3)/MCL (ref 0.6–4.6)
LYMPHOCYTES NFR BLD AUTO: 27.9 %
MCH RBC QN AUTO: 30.9 PG (ref 27–31)
MCHC RBC AUTO-ENTMCNC: 33.3 G/DL (ref 33–36)
MCV RBC AUTO: 92.8 FL (ref 80–94)
MONOCYTES # BLD AUTO: 0.63 X10(3)/MCL (ref 0.1–1.3)
MONOCYTES NFR BLD AUTO: 7.4 %
NEUTROPHILS # BLD AUTO: 5.24 X10(3)/MCL (ref 2.1–9.2)
NEUTROPHILS NFR BLD AUTO: 61.9 %
NRBC BLD AUTO-RTO: 0 %
PLATELET # BLD AUTO: 251 X10(3)/MCL (ref 130–400)
PMV BLD AUTO: 9.8 FL (ref 7.4–10.4)
POTASSIUM SERPL-SCNC: 5 MMOL/L (ref 3.5–5.1)
PROT SERPL-MCNC: 7.7 GM/DL (ref 6.4–8.3)
RBC # BLD AUTO: 4.69 X10(6)/MCL (ref 4.7–6.1)
SODIUM SERPL-SCNC: 141 MMOL/L (ref 136–145)
T PALLIDUM AB SER QL: NONREACTIVE
TRIGL SERPL-MCNC: 71 MG/DL (ref 34–140)
TSH SERPL-ACNC: 0.59 UIU/ML (ref 0.35–4.94)
VLDLC SERPL CALC-MCNC: 14 MG/DL
WBC # BLD AUTO: 8.46 X10(3)/MCL (ref 4.5–11.5)

## 2025-07-28 PROCEDURE — 86780 TREPONEMA PALLIDUM: CPT | Performed by: PSYCHIATRY & NEUROLOGY

## 2025-07-28 PROCEDURE — 25000003 PHARM REV CODE 250

## 2025-07-28 PROCEDURE — 80053 COMPREHEN METABOLIC PANEL: CPT | Performed by: PSYCHIATRY & NEUROLOGY

## 2025-07-28 PROCEDURE — 83036 HEMOGLOBIN GLYCOSYLATED A1C: CPT | Performed by: PSYCHIATRY & NEUROLOGY

## 2025-07-28 PROCEDURE — 84443 ASSAY THYROID STIM HORMONE: CPT | Performed by: PSYCHIATRY & NEUROLOGY

## 2025-07-28 PROCEDURE — 36415 COLL VENOUS BLD VENIPUNCTURE: CPT | Performed by: PSYCHIATRY & NEUROLOGY

## 2025-07-28 PROCEDURE — 25000003 PHARM REV CODE 250: Performed by: PSYCHIATRY & NEUROLOGY

## 2025-07-28 PROCEDURE — 80061 LIPID PANEL: CPT | Performed by: PSYCHIATRY & NEUROLOGY

## 2025-07-28 PROCEDURE — 11400000 HC PSYCH PRIVATE ROOM

## 2025-07-28 PROCEDURE — 85025 COMPLETE CBC W/AUTO DIFF WBC: CPT | Performed by: PSYCHIATRY & NEUROLOGY

## 2025-07-28 RX ORDER — LORAZEPAM 0.5 MG/1
0.5 TABLET ORAL 2 TIMES DAILY
Status: DISCONTINUED | OUTPATIENT
Start: 2025-08-01 | End: 2025-07-31

## 2025-07-28 RX ORDER — LORAZEPAM 0.5 MG/1
0.5 TABLET ORAL 3 TIMES DAILY
Status: DISCONTINUED | OUTPATIENT
Start: 2025-07-30 | End: 2025-07-31

## 2025-07-28 RX ORDER — LORAZEPAM 1 MG/1
1 TABLET ORAL 3 TIMES DAILY
Status: COMPLETED | OUTPATIENT
Start: 2025-07-28 | End: 2025-07-29

## 2025-07-28 RX ORDER — HYDROXYZINE HYDROCHLORIDE 50 MG/1
50 TABLET, FILM COATED ORAL 2 TIMES DAILY
Status: DISCONTINUED | OUTPATIENT
Start: 2025-07-28 | End: 2025-08-01 | Stop reason: HOSPADM

## 2025-07-28 RX ADMIN — HYDROXYZINE HYDROCHLORIDE 50 MG: 50 TABLET ORAL at 08:07

## 2025-07-28 RX ADMIN — LORAZEPAM 1 MG: 1 TABLET ORAL at 08:07

## 2025-07-28 RX ADMIN — OLANZAPINE 10 MG: 5 TABLET, FILM COATED ORAL at 08:07

## 2025-07-28 RX ADMIN — LORAZEPAM 1 MG: 1 TABLET ORAL at 09:07

## 2025-07-28 RX ADMIN — HYDROXYZINE HYDROCHLORIDE 50 MG: 50 TABLET ORAL at 09:07

## 2025-07-28 RX ADMIN — Medication 6 MG: at 08:07

## 2025-07-28 RX ADMIN — LORAZEPAM 1 MG: 1 TABLET ORAL at 03:07

## 2025-07-28 NOTE — PLAN OF CARE
Problem: Adult Behavioral Health Plan of Care  Goal: Plan of Care Review  Outcome: Ongoing  Goal: Patient-Specific Goal (Individualization)  Outcome: Ongoing  Goal: Adheres to Safety Considerations for Self and Others  Outcome: Ongoing  Goal: Absence of New-Onset Illness or Injury  Outcome: Ongoing  Goal: Optimized Coping Skills in Response to Life Stressors  Outcome: Ongoing  Goal: Develops/Participates in Therapeutic [place] to Support Successful Transition  Outcome: Ongoing  Goal: Rounds/Family Conference  Outcome: Ongoing     Problem: Psychotic Signs/Symptoms  Goal: Improved Behavioral Control (Psychotic Signs/Symptoms)  Outcome: Ongoing  Goal: Optimal Cognitive Function (Psychotic Signs/Symptoms)  Outcome: Ongoing  Goal: Increased Participation and Engagement (Psychotic Signs/Symptoms)  Outcome: Ongoing  Goal: Improved Mood Symptoms (Psychotic Signs/Symptoms)  Outcome: Ongoing  Goal: Improved Psychomotor Symptoms (Psychotic Signs/Symptoms)  Outcome: Ongoing  Goal: Decreased Sensory Symptoms (Psychotic Signs/Symptoms)  Outcome: Ongoing  Goal: Improved Sleep (Psychotic Signs/Symptoms)  Outcome: Ongoing  Goal: Enhanced Social, Occupational or Functional Skills (Psychotic Signs/Symptoms)  Outcome: Ongoing     Problem: Violence Risk or Actual  Goal: Anger and Impulse Control  Outcome: Ongoing     Pt has moments of disorientation and confusion, he was seen walking down the [place] with a styrofoam cup up to his ear talking to himself, although he denies SI/HI/AVH. He is anxious with a flat affect, stays to himself and does not socialize,

## 2025-07-28 NOTE — PLAN OF CARE
Problem: Adult Behavioral Health Plan of Care  Goal: Plan of Care Review  Outcome: Ongoing  Flowsheets (Taken 7/28/2025 0423)  Patient Agreement with Plan of Care: agrees  Plan of Care Reviewed With: patient  Goal: Patient-Specific Goal (Individualization)  Outcome: Ongoing  Flowsheets (Taken 7/28/2025 0423)  Patient Personal Strengths: family/social support  Patient Vulnerabilities:   lacks insight into illness   poor impulse control   substance abuse/addiction  Goal: Adheres to Safety Considerations for Self and Others  Outcome: Ongoing  Goal: Absence of New-Onset Illness or Injury  Outcome: Ongoing  Goal: Optimized Coping Skills in Response to Life Stressors  Outcome: Ongoing  Flowsheets (Taken 7/28/2025 0423)  Optimized Coping Skills in Response to Life Stressors: making progress toward outcome  Intervention: Promote Effective Coping Strategies  Flowsheets (Taken 7/28/2025 0423)  Supportive Measures:   active listening utilized   self-care encouraged   self-reflection promoted   self-responsibility promoted   verbalization of feelings encouraged  Goal: Develops/Participates in Therapeutic [place] to Support Successful Transition  Outcome: Ongoing  Goal: Rounds/Family Conference  Outcome: Ongoing     Problem: Psychotic Signs/Symptoms  Goal: Improved Behavioral Control (Psychotic Signs/Symptoms)  Outcome: Ongoing  Flowsheets (Taken 7/28/2025 0423)  Mutually Determined Action Steps (Improved Behavioral Control):   identifies symptoms triggers   verbalizes gratifying activity   verbalizes personal treatment goal  Intervention: Manage Behavior  Flowsheets (Taken 7/28/2025 0423)  De-Escalation Techniques:   appropriate behavior reinforced   stimulation decreased  Goal: Optimal Cognitive Function (Psychotic Signs/Symptoms)  Outcome: Ongoing  Goal: Increased Participation and Engagement (Psychotic Signs/Symptoms)  Outcome: Ongoing  Goal: Improved Mood Symptoms (Psychotic Signs/Symptoms)  Outcome: Ongoing  Goal:  Improved Psychomotor Symptoms (Psychotic Signs/Symptoms)  Outcome: Ongoing  Flowsheets (Taken 7/28/2025 0423)  Mutually Determined Action Steps (Improved Psychomotor Symptoms): adheres to medication regimen  Intervention: Manage Psychomotor Movement  Flowsheets (Taken 7/28/2025 0423)  Activity (Behavioral Health): up ad duncan  Patient Performed Hygiene: patient refused  Diversional Activity:   art work   television  Goal: Decreased Sensory Symptoms (Psychotic Signs/Symptoms)  Outcome: Ongoing  Goal: Improved Sleep (Psychotic Signs/Symptoms)  Outcome: Ongoing  Goal: Enhanced Social, Occupational or Functional Skills (Psychotic Signs/Symptoms)  Outcome: Ongoing  Flowsheets (Taken 7/28/2025 0423)  Mutually Determined Action Steps (Enhanced Social, Occupational or Functional Skills): participates in social skills training  Intervention: Promote Social, Occupational and Functional Ability  Flowsheets (Taken 7/28/2025 0423)  Trust Relationship/Rapport: care explained  Social Functional Ability Promotion: autonomy promoted     Problem: Violence Risk or Actual  Goal: Anger and Impulse Control  Outcome: Ongoing  Intervention: Minimize Safety Risk  Flowsheets (Taken 7/28/2025 0423)  Behavior Management: behavioral plan reviewed  Sensory Stimulation Regulation:   auditory stimulation minimized   visual stimulation provided  De-Escalation Techniques:   appropriate behavior reinforced   stimulation decreased  Intervention: Promote Self-Control  Flowsheets (Taken 7/28/2025 0423)  Supportive Measures:   active listening utilized   self-care encouraged   self-reflection promoted   self-responsibility promoted   verbalization of feelings encouraged  Environmental Support: calm environment promoted   Patient slept all of shift. Resting quietly with eyes closed. Respirations even and unlabored. Q15 min rounding continue for safety.

## 2025-07-28 NOTE — PROGRESS NOTES
"7/28/2025  Oscar Canseco   1990   [id number]        Psychiatry Progress Note     Chief Complaint: "All right"    SUBJECTIVE:   Oscar Canseco is a 34 y.o. male with a known diagnosis of bipolar I disorder with manic episodes, placed under a Physicians Emergency Certificate (PEC) at [facility] following presentation to [facility] ED. He was brought in by his parents, who reported escalating agitation, insomnia, and delusional thoughts and behaviors. According to his father, the patient had been awake for three days, speaking to himself, and experiencing visual hallucinations. He has a well-documented history of poor medication adherence, often resulting in psychiatric decompensation.     Patient was recently here in April of this year.  Followed by [facility].  Sees Suhas Kramer, Ph.D.  On Klonopin 1mg BID, Adderall 30mg BID, Hydroxyzine 50mg BID, and Ambien 10mg HS.    Grossly cooperative with evaluation.  Will restart some of his psychiatric medication and will monitor progress.  States that he plans to return home on discharge.  Constricted affect.      UDS: (+)cannabis  Blood alcohol: <10      Current Medications:   Scheduled Meds:    nicotine  1 patch Transdermal Daily    OLANZapine  10 mg Oral QHS      PRN Meds:   Current Facility-Administered Medications:     acetaminophen, 650 mg, Oral, Q6H PRN    calcium carbonate, 1,000 mg, Oral, Q8H PRN    haloperidoL, 5 mg, Oral, Q4H PRN **AND** diphenhydrAMINE, 50 mg, Oral, Q4H PRN **AND** LORazepam, 2 mg, Oral, Q4H PRN **AND** haloperidol lactate, 5 mg, Intramuscular, Q4H PRN **AND** diphenhydrAMINE, 50 mg, Intramuscular, Q4H PRN **AND** lorazepam, 2 mg, Intramuscular, Q4H PRN    guaiFENesin 100 mg/5 ml, 200 mg, Oral, Q4H PRN    hydrOXYzine HCL, 50 mg, Oral, Q4H PRN    melatonin, 6 mg, Oral, Nightly PRN    ondansetron, 4 mg, Oral, Q8H PRN    traZODone, 100 mg, Oral, Nightly PRN   Psychotherapeutics (From admission, onward)      Start     Stop Route " "Frequency Ordered    07/27/25 2100  OLANZapine tablet 10 mg         -- Oral Nightly 07/27/25 1309    07/26/25 1902  haloperidoL tablet 5 mg  (Med - Acute  Behavioral Management)        Placed in "And" Linked Group    -- Oral Every 4 hours PRN 07/26/25 1902    07/26/25 1902  LORazepam tablet 2 mg  (Med - Acute  Behavioral Management)        Placed in "And" Linked Group    -- Oral Every 4 hours PRN 07/26/25 1902    07/26/25 1902  haloperidol lactate injection 5 mg  (Med - Acute  Behavioral Management)        Placed in "And" Linked Group    -- IM Every 4 hours PRN 07/26/25 1902    07/26/25 1902  LORazepam injection 2 mg  (Med - Acute  Behavioral Management)        Placed in "And" Linked Group    -- IM Every 4 hours PRN 07/26/25 1902    07/26/25 1902  traZODone tablet 100 mg         -- Oral Nightly PRN 07/26/25 1902            Allergies:   Review of patient's allergies indicates:  No Known Allergies     OBJECTIVE:   Vitals   Vitals:    07/27/25 1600   BP: 112/76   Pulse: 79   Resp: 18   Temp: 98.1 °F (36.7 °C)        Labs/Imaging/Studies:   Recent Results (from the past 36 hours)   Lipid Panel    Collection Time: 07/28/25  7:23 AM   Result Value Ref Range    Cholesterol Total 194 <=200 mg/dL    HDL Cholesterol 43 35 - 60 mg/dL    Triglyceride 71 34 - 140 mg/dL    Cholesterol/HDL Ratio 5 0 - 5    Very Low Density Lipoprotein 14     LDL Cholesterol 137.00 50.00 - 140.00 mg/dL   Hemoglobin A1C    Collection Time: 07/28/25  7:23 AM   Result Value Ref Range    Hemoglobin A1c 5.1 <=7.0 %    Estimated Average Glucose 99.7 mg/dL   Comprehensive Metabolic Panel    Collection Time: 07/28/25  7:23 AM   Result Value Ref Range    Sodium 141 136 - 145 mmol/L    Potassium 5.0 3.5 - 5.1 mmol/L    Chloride 109 (H) 98 - 107 mmol/L    CO2 26 22 - 29 mmol/L    Glucose 86 74 - 100 mg/dL    Blood Urea Nitrogen 17.1 8.9 - 20.6 mg/dL    Creatinine 0.78 0.72 - 1.25 mg/dL    Calcium 9.4 8.4 - 10.2 mg/dL    Protein Total 7.7 6.4 - 8.3 gm/dL    " "Albumin 4.3 3.5 - 5.0 g/dL    Globulin 3.4 2.4 - 3.5 gm/dL    Albumin/Globulin Ratio 1.3 1.1 - 2.0 ratio    Bilirubin Total 1.1 <=1.5 mg/dL    ALP 52 40 - 150 unit/L    ALT 16 0 - 55 unit/L    AST 20 11 - 45 unit/L    eGFR >60 mL/min/1.73/m2    Anion Gap 6.0 mEq/L    BUN/Creatinine Ratio 22    CBC with Differential    Collection Time: 07/28/25  7:23 AM   Result Value Ref Range    WBC 8.46 4.50 - 11.50 x10(3)/mcL    RBC 4.69 (L) 4.70 - 6.10 x10(6)/mcL    Hgb 14.5 14.0 - 18.0 g/dL    Hct 43.5 42.0 - 52.0 %    MCV 92.8 80.0 - 94.0 fL    MCH 30.9 27.0 - 31.0 pg    MCHC 33.3 33.0 - 36.0 g/dL    RDW 12.5 11.5 - 17.0 %    Platelet 251 130 - 400 x10(3)/mcL    MPV 9.8 7.4 - 10.4 fL    Neut % 61.9 %    Lymph % 27.9 %    Mono % 7.4 %    Eos % 2.0 %    Basophil % 0.4 %    Imm Grans % 0.4 %    Neut # 5.24 2.1 - 9.2 x10(3)/mcL    Lymph # 2.36 0.6 - 4.6 x10(3)/mcL    Mono # 0.63 0.1 - 1.3 x10(3)/mcL    Eos # 0.17 0 - 0.9 x10(3)/mcL    Baso # 0.03 <=0.2 x10(3)/mcL    Imm Gran # 0.03 0.00 - 0.04 x10(3)/mcL    NRBC% 0.0 %          Medical Review Of Systems:  Constitutional: negative  Respiratory: negative  Cardiovascular: negative  Gastrointestinal: negative  Genitourinary:negative  Musculoskeletal:negative  Neurological: negative       Psychiatric Mental Status Exam:  General Appearance: appears stated age, well-developed, well-nourished  Arousal: alert  Behavior: cooperative  Movements and Motor Activity: no abnormal involuntary movements noted  Orientation: oriented to person, place, time, and situation  Speech: normal rate, normal rhythm, normal volume, normal tone  Mood: "All right"  Affect: constricted  Thought Process: linear  Associations: intact  Thought Content and Perceptions: denies acute hallucinations/delusions, no suicidal ideation, no homicidal ideation  Recent and Remote Memory: recent memory intact, remote memory intact; per interview/observation with patient  Attention and Concentration: intact, attentive to " conversation; per interview/observation with patient  Fund of Knowledge: intact, aware of current events, vocabulary appropriate; based on history, vocabulary, fund of knowledge, syntax, grammar, and content  Insight: questionable; based on understanding of severity of illness and HPI  Judgment: questionable; based on patient's behavior and HPI     ASSESSMENT/PLAN:   Problems Addressed/Diagnoses:  Bipolar disorder, most recent episode manic, severe, with psychotic features (F31.2)  Generalized Anxiety Disorder (F41.1)   Cannabis use disorder (F12.20)       Past Medical History:   Diagnosis Date    Bipolar disorder     History of psychiatric hospitalization     Hx of psychiatric care     Therapy         Plan:  Bipolar disorder, chronic with acute exacerbation  -Continue Zyprexa    Anxiety, chronic with acute exacerbation  -Hydroxyzine 50mg BID  -Benzodiazepine withdrawal protocol    Cannabis use, chronic with acute exacerbation  -Group/Individual psychotherapy     Expected Disposition Plan: Home        Wil Virgen M.D.

## 2025-07-29 PROCEDURE — 11400000 HC PSYCH PRIVATE ROOM

## 2025-07-29 PROCEDURE — 80338 ANTIDEPRESSANT NOT SPECIFIED: CPT | Performed by: PSYCHIATRY & NEUROLOGY

## 2025-07-29 PROCEDURE — 25000003 PHARM REV CODE 250

## 2025-07-29 PROCEDURE — 36415 COLL VENOUS BLD VENIPUNCTURE: CPT | Performed by: PSYCHIATRY & NEUROLOGY

## 2025-07-29 PROCEDURE — 25000003 PHARM REV CODE 250: Performed by: PSYCHIATRY & NEUROLOGY

## 2025-07-29 RX ADMIN — HYDROXYZINE HYDROCHLORIDE 50 MG: 50 TABLET ORAL at 08:07

## 2025-07-29 RX ADMIN — LORAZEPAM 1 MG: 1 TABLET ORAL at 08:07

## 2025-07-29 RX ADMIN — LORAZEPAM 1 MG: 1 TABLET ORAL at 02:07

## 2025-07-29 RX ADMIN — OLANZAPINE 10 MG: 5 TABLET, FILM COATED ORAL at 08:07

## 2025-07-29 RX ADMIN — TRAZODONE HYDROCHLORIDE 100 MG: 100 TABLET ORAL at 08:07

## 2025-07-29 RX ADMIN — Medication 6 MG: at 08:07

## 2025-07-29 NOTE — PLAN OF CARE
07/29/25 1247   Mutuality/Individual Preferences   Patient/Family-Specific Goals (Include Timeframe) Pt will identify at least two tx goals and acknowledge progress made towards there goals to decrease psychotic symptoms AEB improved selfawarness and redcused symptom severity by 8.2.2025.   Patient Personal Strengths stable living environment;socioeconomic stability;independent living skills;family/social support   Patient Vulnerabilities lacks insight into illness

## 2025-07-29 NOTE — PROGRESS NOTES
"7/29/2025  Oscar Canseco   1990   [id number]        Psychiatry Progress Note     Chief Complaint: "All right"    SUBJECTIVE:   Oscar Canseco is a 34 y.o. male with a known diagnosis of bipolar I disorder with manic episodes, placed under a Physicians Emergency Certificate (PEC) at [facility] following presentation to [facility] ED. He was brought in by his parents, who reported escalating agitation, insomnia, and delusional thoughts and behaviors. According to his father, the patient had been awake for three days, speaking to himself, and experiencing visual hallucinations. He has a well-documented history of poor medication adherence, often resulting in psychiatric decompensation.     Today patient states that he is feeling much better. Affect remains constricted but definitely improved.  Grossly cooperative with evaluation. Tolerating current regimen without issue. Will continue with current POC and will monitor progress.        UDS: (+)cannabis  Blood alcohol: <10      Current Medications:   Scheduled Meds:    hydrOXYzine HCL  50 mg Oral BID    LORazepam  1 mg Oral TID    Followed by    [START ON 7/30/2025] LORazepam  0.5 mg Oral TID    Followed by    [START ON 8/1/2025] LORazepam  0.5 mg Oral BID    nicotine  1 patch Transdermal Daily    OLANZapine  10 mg Oral QHS      PRN Meds:   Current Facility-Administered Medications:     acetaminophen, 650 mg, Oral, Q6H PRN    calcium carbonate, 1,000 mg, Oral, Q8H PRN    haloperidoL, 5 mg, Oral, Q4H PRN **AND** diphenhydrAMINE, 50 mg, Oral, Q4H PRN **AND** LORazepam, 2 mg, Oral, Q4H PRN **AND** haloperidol lactate, 5 mg, Intramuscular, Q4H PRN **AND** diphenhydrAMINE, 50 mg, Intramuscular, Q4H PRN **AND** lorazepam, 2 mg, Intramuscular, Q4H PRN    guaiFENesin 100 mg/5 ml, 200 mg, Oral, Q4H PRN    hydrOXYzine HCL, 50 mg, Oral, Q4H PRN    melatonin, 6 mg, Oral, Nightly PRN    ondansetron, 4 mg, Oral, Q8H PRN    traZODone, 100 mg, Oral, Nightly PRN   Psychotherapeutics (From " "admission, onward)      Start     Stop Route Frequency Ordered    08/01/25 0900  LORazepam tablet 0.5 mg  (Order Panel)        Placed in "Followed by" Linked Group    08/03/25 0859 Oral 2 times daily 07/28/25 0917    07/30/25 0900  LORazepam tablet 0.5 mg  (Order Panel)        Placed in "Followed by" Linked Group    08/01/25 0859 Oral 3 times daily 07/28/25 0917    07/28/25 0930  LORazepam tablet 1 mg  (Order Panel)        Placed in "Followed by" Linked Group    07/30/25 0859 Oral 3 times daily 07/28/25 0917    07/27/25 2100  OLANZapine tablet 10 mg         -- Oral Nightly 07/27/25 1309    07/26/25 1902  haloperidoL tablet 5 mg  (Med - Acute  Behavioral Management)        Placed in "And" Linked Group    -- Oral Every 4 hours PRN 07/26/25 1902    07/26/25 1902  LORazepam tablet 2 mg  (Med - Acute  Behavioral Management)        Placed in "And" Linked Group    -- Oral Every 4 hours PRN 07/26/25 1902    07/26/25 1902  haloperidol lactate injection 5 mg  (Med - Acute  Behavioral Management)        Placed in "And" Linked Group    -- IM Every 4 hours PRN 07/26/25 1902    07/26/25 1902  LORazepam injection 2 mg  (Med - Acute  Behavioral Management)        Placed in "And" Linked Group    -- IM Every 4 hours PRN 07/26/25 1902    07/26/25 1902  traZODone tablet 100 mg         -- Oral Nightly PRN 07/26/25 1902            Allergies:   Review of patient's allergies indicates:  No Known Allergies     OBJECTIVE:   Vitals   Vitals:    07/29/25 0701   BP: 128/85   Pulse: 69   Resp: 17   Temp: 98.4 °F (36.9 °C)        Labs/Imaging/Studies:   Recent Results (from the past 36 hours)   Lipid Panel    Collection Time: 07/28/25  7:23 AM   Result Value Ref Range    Cholesterol Total 194 <=200 mg/dL    HDL Cholesterol 43 35 - 60 mg/dL    Triglyceride 71 34 - 140 mg/dL    Cholesterol/HDL Ratio 5 0 - 5    Very Low Density Lipoprotein 14     LDL Cholesterol 137.00 50.00 - 140.00 mg/dL   Hemoglobin A1C    Collection Time: 07/28/25  7:23 AM "   Result Value Ref Range    Hemoglobin A1c 5.1 <=7.0 %    Estimated Average Glucose 99.7 mg/dL   Comprehensive Metabolic Panel    Collection Time: 07/28/25  7:23 AM   Result Value Ref Range    Sodium 141 136 - 145 mmol/L    Potassium 5.0 3.5 - 5.1 mmol/L    Chloride 109 (H) 98 - 107 mmol/L    CO2 26 22 - 29 mmol/L    Glucose 86 74 - 100 mg/dL    Blood Urea Nitrogen 17.1 8.9 - 20.6 mg/dL    Creatinine 0.78 0.72 - 1.25 mg/dL    Calcium 9.4 8.4 - 10.2 mg/dL    Protein Total 7.7 6.4 - 8.3 gm/dL    Albumin 4.3 3.5 - 5.0 g/dL    Globulin 3.4 2.4 - 3.5 gm/dL    Albumin/Globulin Ratio 1.3 1.1 - 2.0 ratio    Bilirubin Total 1.1 <=1.5 mg/dL    ALP 52 40 - 150 unit/L    ALT 16 0 - 55 unit/L    AST 20 11 - 45 unit/L    eGFR >60 mL/min/1.73/m2    Anion Gap 6.0 mEq/L    BUN/Creatinine Ratio 22    TSH    Collection Time: 07/28/25  7:23 AM   Result Value Ref Range    TSH 0.595 0.350 - 4.940 uIU/mL   SYPHILIS ANTIBODY (WITH REFLEX RPR)    Collection Time: 07/28/25  7:23 AM   Result Value Ref Range    Syphilis Antibody Nonreactive Nonreactive, Equivocal   CBC with Differential    Collection Time: 07/28/25  7:23 AM   Result Value Ref Range    WBC 8.46 4.50 - 11.50 x10(3)/mcL    RBC 4.69 (L) 4.70 - 6.10 x10(6)/mcL    Hgb 14.5 14.0 - 18.0 g/dL    Hct 43.5 42.0 - 52.0 %    MCV 92.8 80.0 - 94.0 fL    MCH 30.9 27.0 - 31.0 pg    MCHC 33.3 33.0 - 36.0 g/dL    RDW 12.5 11.5 - 17.0 %    Platelet 251 130 - 400 x10(3)/mcL    MPV 9.8 7.4 - 10.4 fL    Neut % 61.9 %    Lymph % 27.9 %    Mono % 7.4 %    Eos % 2.0 %    Basophil % 0.4 %    Imm Grans % 0.4 %    Neut # 5.24 2.1 - 9.2 x10(3)/mcL    Lymph # 2.36 0.6 - 4.6 x10(3)/mcL    Mono # 0.63 0.1 - 1.3 x10(3)/mcL    Eos # 0.17 0 - 0.9 x10(3)/mcL    Baso # 0.03 <=0.2 x10(3)/mcL    Imm Gran # 0.03 0.00 - 0.04 x10(3)/mcL    NRBC% 0.0 %          Medical Review Of Systems:  Constitutional: negative  Respiratory: negative  Cardiovascular: negative  Gastrointestinal:  "negative  Genitourinary:negative  Musculoskeletal:negative  Neurological: negative       Psychiatric Mental Status Exam:  General Appearance: appears stated age, well-developed, well-nourished  Arousal: alert  Behavior: cooperative  Movements and Motor Activity: no abnormal involuntary movements noted  Orientation: oriented to person, place, time, and situation  Speech: normal rate, normal rhythm, normal volume, normal tone  Mood: "All right"  Affect: constricted  Thought Process: linear  Associations: intact  Thought Content and Perceptions: denies acute hallucinations/delusions, no suicidal ideation, no homicidal ideation  Recent and Remote Memory: recent memory intact, remote memory intact; per interview/observation with patient  Attention and Concentration: intact, attentive to conversation; per interview/observation with patient  Fund of Knowledge: intact, aware of current events, vocabulary appropriate; based on history, vocabulary, fund of knowledge, syntax, grammar, and content  Insight: questionable; based on understanding of severity of illness and HPI  Judgment: questionable; based on patient's behavior and HPI     ASSESSMENT/PLAN:   Problems Addressed/Diagnoses:  Bipolar disorder, most recent episode manic, severe, with psychotic features (F31.2)  Generalized Anxiety Disorder (F41.1)   Cannabis use disorder (F12.20)       Past Medical History:   Diagnosis Date    Bipolar disorder     History of psychiatric hospitalization     Hx of psychiatric care     Therapy         Plan:  Bipolar disorder, chronic with acute exacerbation  -Continue Zyprexa    Anxiety, chronic with acute exacerbation  -Hydroxyzine 50mg BID  -Benzodiazepine withdrawal protocol    Cannabis use, chronic with acute exacerbation  -Group/Individual psychotherapy     Expected Disposition Plan: Home        VANDANA Tang-BC  "

## 2025-07-29 NOTE — PLAN OF CARE
Problem: Adult Behavioral Health Plan of Care  Goal: Plan of Care Review  Outcome: Adequate for Care Transition  Flowsheets (Taken 7/29/2025 0400)  Patient Agreement with Plan of Care: agrees  Plan of Care Reviewed With: patient  Goal: Patient-Specific Goal (Individualization)  Outcome: Adequate for Care Transition  Flowsheets (Taken 7/29/2025 0400)  Patient Personal Strengths: family/social support  Patient Vulnerabilities:   lacks insight into illness   poor impulse control   substance abuse/addiction  Goal: Adheres to Safety Considerations for Self and Others  Outcome: Adequate for Care Transition  Flowsheets (Taken 7/29/2025 0400)  Adheres to Safety Considerations for Self and Others: making progress toward outcome  Intervention: Develop and Maintain Individualized Safety Plan  Flowsheets (Taken 7/29/2025 0400)  Safety Measures:   monitored by video   safety plan reviewed   safety rounds completed  Goal: Absence of New-Onset Illness or Injury  Outcome: Adequate for Care Transition  Intervention: Identify and Manage Fall Risk  Flowsheets (Taken 7/29/2025 0400)  Safety Measures:   monitored by video   safety plan reviewed   safety rounds completed  Intervention: Prevent Skin Injury  Flowsheets (Taken 7/29/2025 0400)  Device Skin Pressure Protection: adhesive use limited  Intervention: Prevent VTE (Venous Thromboembolism)  Flowsheets (Taken 7/29/2025 0400)  VTE Prevention/Management: fluids promoted  Intervention: Prevent Infection  Flowsheets (Taken 7/29/2025 0400)  Infection Prevention: environmental surveillance performed  Goal: Optimized Coping Skills in Response to Life Stressors  Outcome: Adequate for Care Transition  Flowsheets (Taken 7/29/2025 0400)  Optimized Coping Skills in Response to Life Stressors: making progress toward outcome  Goal: Develops/Participates in Therapeutic [place] to Support Successful Transition  Outcome: Adequate for Care Transition  Goal: Rounds/Family Conference  Outcome:  Adequate for Care Transition     Problem: Psychotic Signs/Symptoms  Goal: Improved Behavioral Control (Psychotic Signs/Symptoms)  Outcome: Adequate for Care Transition  Goal: Optimal Cognitive Function (Psychotic Signs/Symptoms)  Outcome: Adequate for Care Transition  Goal: Increased Participation and Engagement (Psychotic Signs/Symptoms)  Outcome: Adequate for Care Transition  Flowsheets (Taken 7/29/2025 0400)  Mutually Determined Action Steps (Increased Participation and Engagement):   identifies symptoms triggers   verbalizes gratifying activity  Goal: Improved Mood Symptoms (Psychotic Signs/Symptoms)  Outcome: Adequate for Care Transition  Flowsheets (Taken 7/29/2025 0400)  Mutually Determined Action Steps (Improved Mood Symptoms):   acknowledges progress   verbalizes increased insight  Goal: Improved Psychomotor Symptoms (Psychotic Signs/Symptoms)  Outcome: Adequate for Care Transition  Intervention: Manage Psychomotor Movement  Flowsheets (Taken 7/29/2025 0400)  Activity (Behavioral Health):   up in chair   up ad duncan  Goal: Decreased Sensory Symptoms (Psychotic Signs/Symptoms)  Outcome: Adequate for Care Transition  Goal: Improved Sleep (Psychotic Signs/Symptoms)  Outcome: Adequate for Care Transition  Flowsheets (Taken 7/29/2025 0400)  Mutually Determined Action Steps (Improved Sleep): sleeps 4-6 hours at night  Intervention: Promote Healthy Sleep Hygiene  Flowsheets (Taken 7/29/2025 0400)  Sleep Hygiene Promotion: regular sleep pattern promoted  Goal: Enhanced Social, Occupational or Functional Skills (Psychotic Signs/Symptoms)  Outcome: Adequate for Care Transition     Problem: Violence Risk or Actual  Goal: Anger and Impulse Control  Outcome: Adequate for Care Transition  Intervention: Minimize Safety Risk  Flowsheets (Taken 7/29/2025 0400)  Behavior Management: behavioral plan reviewed  Sensory Stimulation Regulation:   auditory stimulation minimized   quiet environment promoted  De-Escalation Techniques:  "appropriate behavior reinforced  Enhanced Safety Measures: monitored by video   Continues to respond to internal stimuli. Compliant with meds. Bizarre. Flat affect. "Okay" mood. Minimally interacting, with staff and peers.  "

## 2025-07-29 NOTE — PLAN OF CARE
"  Problem: Adult Behavioral Health Plan of Care  Goal: Plan of Care Review  Outcome: Ongoing  Goal: Patient-Specific Goal (Individualization)  Outcome: Ongoing  Goal: Adheres to Safety Considerations for Self and Others  Outcome: Ongoing  Goal: Absence of New-Onset Illness or Injury  Outcome: Ongoing  Goal: Optimized Coping Skills in Response to Life Stressors  Outcome: Ongoing  Goal: Develops/Participates in Therapeutic [place] to Support Successful Transition  Outcome: Ongoing  Goal: Rounds/Family Conference  Outcome: Ongoing     Problem: Psychotic Signs/Symptoms  Goal: Improved Behavioral Control (Psychotic Signs/Symptoms)  Outcome: Ongoing  Goal: Optimal Cognitive Function (Psychotic Signs/Symptoms)  Outcome: Ongoing  Goal: Increased Participation and Engagement (Psychotic Signs/Symptoms)  Outcome: Ongoing  Goal: Improved Mood Symptoms (Psychotic Signs/Symptoms)  Outcome: Ongoing  Goal: Improved Psychomotor Symptoms (Psychotic Signs/Symptoms)  Outcome: Ongoing  Goal: Decreased Sensory Symptoms (Psychotic Signs/Symptoms)  Outcome: Ongoing  Goal: Improved Sleep (Psychotic Signs/Symptoms)  Outcome: Ongoing  Goal: Enhanced Social, Occupational or Functional Skills (Psychotic Signs/Symptoms)  Outcome: Ongoing     Problem: Violence Risk or Actual  Goal: Anger and Impulse Control  Outcome: Ongoing    Pt has moments of confusion, he claims he is feeling better, he denies SI/HI/AVH, he stays to himself most of the time or is walking the halls, the said "my patience is trying being in here"      "
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
## 2025-07-30 PROCEDURE — 25000003 PHARM REV CODE 250

## 2025-07-30 PROCEDURE — 11400000 HC PSYCH PRIVATE ROOM

## 2025-07-30 PROCEDURE — 25000003 PHARM REV CODE 250: Performed by: PSYCHIATRY & NEUROLOGY

## 2025-07-30 RX ADMIN — HYDROXYZINE HYDROCHLORIDE 50 MG: 50 TABLET ORAL at 08:07

## 2025-07-30 RX ADMIN — LORAZEPAM 0.5 MG: 0.5 TABLET ORAL at 08:07

## 2025-07-30 RX ADMIN — OLANZAPINE 10 MG: 5 TABLET, FILM COATED ORAL at 08:07

## 2025-07-30 RX ADMIN — Medication 6 MG: at 08:07

## 2025-07-30 RX ADMIN — LORAZEPAM 0.5 MG: 0.5 TABLET ORAL at 02:07

## 2025-07-30 RX ADMIN — TRAZODONE HYDROCHLORIDE 100 MG: 100 TABLET ORAL at 08:07

## 2025-07-30 NOTE — PLAN OF CARE
Problem: Adult Behavioral Health Plan of Care  Goal: Plan of Care Review  Outcome: Progressing  Flowsheets (Taken 7/30/2025 1706)  Patient Agreement with Plan of Care: agrees  Plan of Care Reviewed With: patient  Goal: Patient-Specific Goal (Individualization)  Outcome: Progressing  Flowsheets (Taken 7/30/2025 1706)  Patient Personal Strengths: expressive of needs  Patient Vulnerabilities: substance abuse/addiction  Goal: Adheres to Safety Considerations for Self and Others  Outcome: Progressing  Intervention: Develop and Maintain Individualized Safety Plan  Flowsheets (Taken 7/30/2025 1706)  Safety Measures: safety plan mutually developed  Goal: Optimized Coping Skills in Response to Life Stressors  Outcome: Progressing  Flowsheets (Taken 7/30/2025 1706)  Optimized Coping Skills in Response to Life Stressors: making progress toward outcome     Problem: Psychotic Signs/Symptoms  Goal: Optimal Cognitive Function (Psychotic Signs/Symptoms)  Outcome: Progressing  Flowsheets (Taken 7/30/2025 1706)  Mutually Determined Action Steps (Optimal Cognitive Function): remains focused during activity  Goal: Improved Mood Symptoms (Psychotic Signs/Symptoms)  Outcome: Progressing  Flowsheets (Taken 7/30/2025 1706)  Mutually Determined Action Steps (Improved Mood Symptoms): engages in physical activity  Goal: Enhanced Social, Occupational or Functional Skills (Psychotic Signs/Symptoms)  Outcome: Progressing  Flowsheets (Taken 7/30/2025 1706)  Mutually Determined Action Steps (Enhanced Social, Occupational or Functional Skills): identifies personal strengths     Problem: Violence Risk or Actual  Goal: Anger and Impulse Control  Outcome: Progressing  Intervention: Minimize Safety Risk  Flowsheets (Taken 7/30/2025 1706)  Sensory Stimulation Regulation: auditory stimulation minimized

## 2025-07-30 NOTE — PLAN OF CARE
Problem: Adult Behavioral Health Plan of Care  Goal: Plan of Care Review  Outcome: Progressing  Flowsheets (Taken 7/29/2025 2112)  Patient Agreement with Plan of Care: agrees  Plan of Care Reviewed With: patient  Goal: Patient-Specific Goal (Individualization)  Outcome: Progressing  Flowsheets (Taken 7/29/2025 2112)  Patient Personal Strengths:   expressive of emotions   expressive of needs   medication/treatment adherence   motivated for treatment  Patient Vulnerabilities: family/relationship conflict  Goal: Adheres to Safety Considerations for Self and Others  Outcome: Progressing  Flowsheets (Taken 7/29/2025 2112)  Adheres to Safety Considerations for Self and Others: making progress toward outcome  Intervention: Develop and Maintain Individualized Safety Plan  Flowsheets (Taken 7/29/2025 2112)  Safety Measures: monitored by video  Goal: Absence of New-Onset Illness or Injury  Outcome: Progressing  Intervention: Identify and Manage Fall Risk  Flowsheets (Taken 7/29/2025 2112)  Safety Measures: monitored by video  Intervention: Prevent Skin Injury  Flowsheets (Taken 7/29/2025 2112)  Device Skin Pressure Protection: adhesive use limited  Intervention: Prevent VTE (Venous Thromboembolism)  Flowsheets (Taken 7/29/2025 2112)  VTE Prevention/Management:   fluids promoted   ROM (active) performed  Intervention: Prevent Infection  Flowsheets (Taken 7/29/2025 2112)  Infection Prevention:   hand hygiene promoted   rest/sleep promoted  Goal: Optimized Coping Skills in Response to Life Stressors  Outcome: Progressing  Flowsheets (Taken 7/29/2025 2112)  Optimized Coping Skills in Response to Life Stressors: making progress toward outcome  Intervention: Promote Effective Coping Strategies  Flowsheets (Taken 7/29/2025 2112)  Supportive Measures: active listening utilized  Goal: Develops/Participates in Therapeutic [place] to Support Successful Transition  Outcome: Progressing  Flowsheets (Taken 7/29/2025  2112)  Develops/Participates in Therapeutic [place] to Support Successful Transition: making progress toward outcome  Intervention: Foster Therapeutic [place]  Flowsheets (Taken 7/29/2025 2112)  Trust Relationship/Rapport:   questions answered   questions encouraged   care explained  Intervention: Mutually Develop Transition Plan  Flowsheets (Taken 7/29/2025 2112)  Concerns to be Addressed: basic needs  Goal: Rounds/Family Conference  Outcome: Progressing  Flowsheets (Taken 7/29/2025 2112)  Participants: nursing     Problem: Psychotic Signs/Symptoms  Goal: Improved Behavioral Control (Psychotic Signs/Symptoms)  Outcome: Progressing  Flowsheets (Taken 7/29/2025 2112)  Mutually Determined Action Steps (Improved Behavioral Control): verbalizes personal treatment goal  Intervention: Manage Behavior  Flowsheets (Taken 7/29/2025 2112)  De-Escalation Techniques: physical activity promoted  Goal: Optimal Cognitive Function (Psychotic Signs/Symptoms)  Outcome: Progressing  Flowsheets (Taken 7/29/2025 2112)  Mutually Determined Action Steps (Optimal Cognitive Function): participates in attention training  Intervention: Support and Promote Cognitive Ability  Flowsheets (Taken 7/29/2025 2112)  Trust Relationship/Rapport:   questions answered   questions encouraged   care explained  Goal: Increased Participation and Engagement (Psychotic Signs/Symptoms)  Outcome: Progressing  Flowsheets (Taken 7/29/2025 2112)  Mutually Determined Action Steps (Increased Participation and Engagement): verbalizes personal treatment goal  Intervention: Facilitate Participation and Engagement  Flowsheets (Taken 7/29/2025 2112)  Supportive Measures: active listening utilized  Diversional Activity: television  Goal: Improved Mood Symptoms (Psychotic Signs/Symptoms)  Outcome: Progressing  Flowsheets (Taken 7/29/2025 2112)  Mutually Determined Action Steps (Improved Mood Symptoms): acknowledges progress  Intervention: Optimize Emotion and  Mood  Flowsheets (Taken 7/29/2025 2112)  Supportive Measures: active listening utilized  Diversional Activity: television  Goal: Improved Psychomotor Symptoms (Psychotic Signs/Symptoms)  Outcome: Progressing  Flowsheets (Taken 7/29/2025 2112)  Mutually Determined Action Steps (Improved Psychomotor Symptoms): adheres to medication regimen  Intervention: Manage Psychomotor Movement  Flowsheets (Taken 7/29/2025 2112)  Activity (Behavioral Health): up ad duncan  Patient Performed Hygiene: teeth brushed  Diversional Activity: television  Goal: Decreased Sensory Symptoms (Psychotic Signs/Symptoms)  Outcome: Progressing  Flowsheets (Taken 7/29/2025 2112)  Mutually Determined Action Steps (Decreased Sensory Symptoms): adheres to medication regimen  Intervention: Minimize and Manage Sensory Impairment  Flowsheets (Taken 7/29/2025 2112)  Sensory Stimulation Regulation: quiet environment promoted  Goal: Improved Sleep (Psychotic Signs/Symptoms)  Outcome: Progressing  Flowsheets (Taken 7/29/2025 2112)  Mutually Determined Action Steps (Improved Sleep): sleeps 4-6 hours at night  Intervention: Promote Healthy Sleep Hygiene  Flowsheets (Taken 7/29/2025 2112)  Sleep Hygiene Promotion:   regular sleep pattern promoted   relaxation techniques promoted  Goal: Enhanced Social, Occupational or Functional Skills (Psychotic Signs/Symptoms)  Outcome: Progressing  Flowsheets (Taken 7/29/2025 2112)  Mutually Determined Action Steps (Enhanced Social, Occupational or Functional Skills): identifies personal strengths  Intervention: Promote Social, Occupational and Functional Ability  Flowsheets (Taken 7/29/2025 2112)  Trust Relationship/Rapport:   questions answered   questions encouraged   care explained  Social Functional Ability Promotion: social interaction promoted     Problem: Violence Risk or Actual  Goal: Anger and Impulse Control  Outcome: Progressing  Intervention: Minimize Safety Risk  Flowsheets (Taken 7/29/2025 2112)  Sensory  "Stimulation Regulation: quiet environment promoted  De-Escalation Techniques: physical activity promoted  Enhanced Safety Measures: monitored by video  Intervention: Promote Self-Control  Flowsheets (Taken 7/29/2025 2112)  Supportive Measures: active listening utilized  Environmental Support:   calm environment promoted   environmental consistency promoted   Anxious, constricted, flat affect, "Okay" mood, continues to respond to internal stimuli, denies SI/HI/AVH, compliant with medications  "

## 2025-07-30 NOTE — PLAN OF CARE
Problem: Adult Behavioral Health Plan of Care  Goal: Plan of Care Review  Flowsheets (Taken 7/30/2025 1014)  Patient Agreement with Plan of Care: agrees  Plan of Care Reviewed With: patient  Treatment Team  Pt seen for treatment team today with interdisciplinary team. Pt was cooperative with appropriate thought process with Tx team. Pt reported he is a little ansey due to staying put. Pt stated he feels like his meds are working due to less hostile feelings.  Pt verbalized understanding of care plan and agreed to being discharged to home along with outpatient follow-up.

## 2025-07-30 NOTE — GROUP NOTE
Group Psychotherapy       Group Focus: Stress Management   Group topic: Coping Skills: Therapist explored patients need for increase in effective coping skills to deal with stress or anxiety.        Number of patients in attendance: 5  Group Start Time: 1545  Group End Time:  1640  Groups Date: 7/29/2025  Group Topic:  Behavioral Health  Group Department: [facility]  Group Facilitators:  Kary Joshua MSW  _____________________________________________________________________    Patient Name: Oscar Canseco  MRN: [id number]  Patient Class: IP- Psych   Admission Date\Time: 7/26/2025  6:38 PM  Hospital Length of Stay: 4  Primary Care Provider: No primary care provider on file.     Referred by: Acute Psychiatry Unit Treatment Team     Target symptoms: Psychosis and Poor Coping Skills     Patient's response to treatment: Pt was in and out of group session but did not engage.      Progress toward goals: Progressing slowly     Plan: Continue treatment on APU

## 2025-07-30 NOTE — GROUP NOTE
Group Psychotherapy       Group Focus: Stress Management   Group Topic: Stress Management/Crisis Plan. Therapist assisted with understanding of treatment plan, identification of responsibilities for actions, assisted patients in identifying sources of support and developing awareness of crisis symptoms.    Number of patients in attendance: 4  Group Start Time: 1030  Group End Time:  1115  Groups Date: 7/30/2025  Group Topic:  Behavioral Health  Group Department: [facility]  Group Facilitators:  Martita Malik  _____________________________________________________________________    Patient Name: Oscar Canseco  MRN: [id number]  Patient Class: IP- Psych   Admission Date\Time: 7/26/2025  6:38 PM  Hospital Length of Stay: 5  Primary Care Provider: No primary care provider on file.     Referred by: Acute Psychiatry Unit Treatment Team     Target symptoms: Psychosis     Patient's response to treatment: Pt did not attend group, alternative was provided     Progress toward goals: Progressing adequately     Interval History:      Diagnosis:      Plan: Continue treatment on APU

## 2025-07-30 NOTE — NURSING
2022 pt requesting medication for sleep. Given 6mg melatonin and 100mg trazodone po  2052 pt resting comfortably in room NADN

## 2025-07-30 NOTE — NURSING
Patient claimed of anxiety this morning, denies depression. ,denies SI and HI,denies visual and auditory hallucinations.ambulatory with steady gait.

## 2025-07-30 NOTE — GROUP NOTE
Group Psychotherapy       Group Focus: Communication Skills      Number of patients in attendance: 7  Group Start Time: 0915  Group End Time:  1000  Groups Date: 7/28/2025  Group Topic:  Behavioral Health  Group Department: [facility] - Behavioral Health Unit  Group Facilitators:  Martita Malik  _____________________________________________________________________    Patient Name: Oscar Canseco  MRN: [id number]  Patient Class: IP- Psych   Admission Date\Time: 7/26/2025  6:38 PM  Hospital Length of Stay: 3  Primary Care Provider: No primary care provider on file.     Referred by: Acute Psychiatry Unit Treatment Team     Target symptoms: Psychosis     Patient's response to treatment: Pt did not attend group, alternative was provided     Progress toward goals: Progressing slowly     Interval History:      Diagnosis:      Plan: Continue treatment on APU

## 2025-07-30 NOTE — PROGRESS NOTES
"7/30/2025  Oscar Canseco   1990   [id number]        Psychiatry Progress Note     Chief Complaint: "Pretty good"    SUBJECTIVE:   Oscar Canseco is a 34 y.o. male with a known diagnosis of bipolar I disorder with manic episodes, placed under a Physicians Emergency Certificate (PEC) at [facility] following presentation to [facility] ED. He was brought in by his parents, who reported escalating agitation, insomnia, and delusional thoughts and behaviors. According to his father, the patient had been awake for three days, speaking to himself, and experiencing visual hallucinations. He has a well-documented history of poor medication adherence, often resulting in psychiatric decompensation.     Attending some groups but does not always stay for the entirety.  This morning, he states that he is "pretty good but a little antsy."  Less hostile feeling.  No tremors, diaphoresis, diarrhea, or other withdrawal-related symptoms noted.        UDS: (+)cannabis  Blood alcohol: <10      Current Medications:   Scheduled Meds:    hydrOXYzine HCL  50 mg Oral BID    LORazepam  0.5 mg Oral TID    Followed by    [START ON 8/1/2025] LORazepam  0.5 mg Oral BID    nicotine  1 patch Transdermal Daily    OLANZapine  10 mg Oral QHS      PRN Meds:   Current Facility-Administered Medications:     acetaminophen, 650 mg, Oral, Q6H PRN    calcium carbonate, 1,000 mg, Oral, Q8H PRN    haloperidoL, 5 mg, Oral, Q4H PRN **AND** diphenhydrAMINE, 50 mg, Oral, Q4H PRN **AND** LORazepam, 2 mg, Oral, Q4H PRN **AND** haloperidol lactate, 5 mg, Intramuscular, Q4H PRN **AND** diphenhydrAMINE, 50 mg, Intramuscular, Q4H PRN **AND** lorazepam, 2 mg, Intramuscular, Q4H PRN    guaiFENesin 100 mg/5 ml, 200 mg, Oral, Q4H PRN    hydrOXYzine HCL, 50 mg, Oral, Q4H PRN    melatonin, 6 mg, Oral, Nightly PRN    ondansetron, 4 mg, Oral, Q8H PRN    traZODone, 100 mg, Oral, Nightly PRN   Psychotherapeutics (From admission, onward)      Start     Stop Route Frequency Ordered    " "08/01/25 0900  LORazepam tablet 0.5 mg  (Order Panel)        Placed in "Followed by" Linked Group    08/03/25 0859 Oral 2 times daily 07/28/25 0917    07/30/25 0900  LORazepam tablet 0.5 mg  (Order Panel)        Placed in "Followed by" Linked Group    08/01/25 0859 Oral 3 times daily 07/28/25 0917    07/27/25 2100  OLANZapine tablet 10 mg         -- Oral Nightly 07/27/25 1309    07/26/25 1902  haloperidoL tablet 5 mg  (Med - Acute  Behavioral Management)        Placed in "And" Linked Group    -- Oral Every 4 hours PRN 07/26/25 1902    07/26/25 1902  LORazepam tablet 2 mg  (Med - Acute  Behavioral Management)        Placed in "And" Linked Group    -- Oral Every 4 hours PRN 07/26/25 1902    07/26/25 1902  haloperidol lactate injection 5 mg  (Med - Acute  Behavioral Management)        Placed in "And" Linked Group    -- IM Every 4 hours PRN 07/26/25 1902    07/26/25 1902  LORazepam injection 2 mg  (Med - Acute  Behavioral Management)        Placed in "And" Linked Group    -- IM Every 4 hours PRN 07/26/25 1902    07/26/25 1902  traZODone tablet 100 mg         -- Oral Nightly PRN 07/26/25 1902            Allergies:   Review of patient's allergies indicates:  No Known Allergies     OBJECTIVE:   Vitals   Vitals:    07/29/25 1901   BP: 120/87   Pulse: 69   Resp: 18   Temp: 98 °F (36.7 °C)        Labs/Imaging/Studies:   No results found for this or any previous visit (from the past 36 hours).         Medical Review Of Systems:  Constitutional: negative  Respiratory: negative  Cardiovascular: negative  Gastrointestinal: negative  Genitourinary:negative  Musculoskeletal:negative  Neurological: negative       Psychiatric Mental Status Exam:  General Appearance: appears stated age, well-developed, well-nourished  Arousal: alert  Behavior: cooperative  Movements and Motor Activity: no abnormal involuntary movements noted  Orientation: oriented to person, place, time, and situation  Speech: normal rate, normal rhythm, normal " "volume, normal tone  Mood: "Pretty good"  Affect: constricted  Thought Process: linear  Associations: intact  Thought Content and Perceptions: denies acute hallucinations/delusions, no suicidal ideation, no homicidal ideation  Recent and Remote Memory: recent memory intact, remote memory intact; per interview/observation with patient  Attention and Concentration: intact, attentive to conversation; per interview/observation with patient  Fund of Knowledge: intact, aware of current events, vocabulary appropriate; based on history, vocabulary, fund of knowledge, syntax, grammar, and content  Insight: questionable; based on understanding of severity of illness and HPI  Judgment: questionable; based on patient's behavior and HPI     ASSESSMENT/PLAN:   Problems Addressed/Diagnoses:  Bipolar disorder, most recent episode manic, severe, with psychotic features (F31.2)  Generalized Anxiety Disorder (F41.1)   Cannabis use disorder (F12.20)       Past Medical History:   Diagnosis Date    Bipolar disorder     History of psychiatric hospitalization     Hx of psychiatric care     Therapy         Plan:  Bipolar disorder, chronic with acute exacerbation  -Continue Zyprexa    Anxiety, chronic with acute exacerbation  -Continue Hydroxyzine 50mg BID  -Benzodiazepine withdrawal protocol    Cannabis use, chronic with acute exacerbation  -Group/Individual psychotherapy     Expected Disposition Plan: Home        Wil Virgen M.D.   "

## 2025-07-31 VITALS
HEART RATE: 71 BPM | DIASTOLIC BLOOD PRESSURE: 89 MMHG | RESPIRATION RATE: 20 BRPM | TEMPERATURE: 98 F | WEIGHT: 187.19 LBS | SYSTOLIC BLOOD PRESSURE: 135 MMHG | BODY MASS INDEX: 24.81 KG/M2 | HEIGHT: 73 IN | OXYGEN SATURATION: 100 %

## 2025-07-31 PROCEDURE — 11400000 HC PSYCH PRIVATE ROOM

## 2025-07-31 PROCEDURE — 25000003 PHARM REV CODE 250

## 2025-07-31 PROCEDURE — 25000003 PHARM REV CODE 250: Performed by: PSYCHIATRY & NEUROLOGY

## 2025-07-31 RX ORDER — LORAZEPAM 0.5 MG/1
0.5 TABLET ORAL ONCE
Status: COMPLETED | OUTPATIENT
Start: 2025-07-31 | End: 2025-07-31

## 2025-07-31 RX ORDER — IBUPROFEN 200 MG
1 TABLET ORAL DAILY
Qty: 28 PATCH | Refills: 0 | Status: SHIPPED | OUTPATIENT
Start: 2025-08-01 | End: 2025-08-29

## 2025-07-31 RX ORDER — OLANZAPINE 10 MG/1
10 TABLET, FILM COATED ORAL NIGHTLY
Qty: 30 TABLET | Refills: 0 | Status: SHIPPED | OUTPATIENT
Start: 2025-07-31 | End: 2025-08-30

## 2025-07-31 RX ADMIN — LORAZEPAM 0.5 MG: 0.5 TABLET ORAL at 08:07

## 2025-07-31 RX ADMIN — HYDROXYZINE HYDROCHLORIDE 50 MG: 50 TABLET ORAL at 08:07

## 2025-07-31 RX ADMIN — LORAZEPAM 0.5 MG: 0.5 TABLET ORAL at 03:07

## 2025-07-31 RX ADMIN — OLANZAPINE 10 MG: 5 TABLET, FILM COATED ORAL at 08:07

## 2025-07-31 RX ADMIN — Medication 6 MG: at 08:07

## 2025-07-31 RX ADMIN — TRAZODONE HYDROCHLORIDE 100 MG: 100 TABLET ORAL at 08:07

## 2025-07-31 NOTE — NURSING
2009 pt requesting medication for sleep. Given 6mg melatonin and 100mg trazodone PO    2039 pt resting in room comfortably. ERIC

## 2025-07-31 NOTE — GROUP NOTE
Group Psychotherapy       Group Focus: Promoting Healthy Lifestyles   Group Topic: Mindfulness. Therapist explored patients need for increase in awareness of mindfulness, how it effects our moods and behaviors. SW discussed importance of mindfulness to decrease negative core values and thoughts, coping skills . Mindfulness activity used to encourage development of healthy coping and relaxation skills. ?    Number of patients in attendance: 9  Group Start Time: 1445  Group End Time:  1530  Groups Date: 7/31/2025  Group Topic:  Behavioral Health  Group Department: [facility] Behavioral [facility] Unit  Group Facilitators:  Martita Malik  _____________________________________________________________________    Patient Name: Oscar Canseco  MRN: [id number]  Patient Class: IP- Psych   Admission Date\Time: 7/26/2025  6:38 PM  Hospital Length of Stay: 5  Primary Care Provider: No primary care provider on file.     Referred by: Acute Psychiatry Unit Treatment Team     Target symptoms: Psychosis     Patient's response to treatment: Active Listening     Progress toward goals: Progressing adequately     Interval History:      Diagnosis:      Plan: Continue treatment on APU

## 2025-07-31 NOTE — PROGRESS NOTES
"7/31/2025  Oscar Canseco   1990   [id number]        Psychiatry Progress Note     Chief Complaint: "Pretty good"    SUBJECTIVE:   Oscar Canseco is a 34 y.o. male with a known diagnosis of bipolar I disorder with manic episodes, placed under a Physicians Emergency Certificate (PEC) at [facility] following presentation to [facility] ED. He was brought in by his parents, who reported escalating agitation, insomnia, and delusional thoughts and behaviors. According to his father, the patient had been awake for three days, speaking to himself, and experiencing visual hallucinations. He has a well-documented history of poor medication adherence, often resulting in psychiatric decompensation.     Patient seen today during psychiatric rounds. Presentation remains grossly unchanged from prior evaluation. Patient reports no significant change in mood, thought content, or behavior. Continues to deny suicidal or homicidal ideation, auditory or visual hallucinations, and delusional thinking.    Mood is described as "at ease". Affect appears appropriate. No acute distress observed. Patient remains cooperative with the interview and demonstrates no overt behavioral issues on the unit per staff report.    Tolerating current medication regimen without reported side effects. Eating and sleeping are adequate, and no new medical concerns are reported.    No indication at this time for medication adjustment. Will continue current plan of care and monitor closely for any emerging symptoms or changes in mental status. Patient remains appropriate for continued inpatient psychiatric treatment due to need for ongoing observation, stabilization, and structured environment. No tremors, diaphoresis, diarrhea, or other withdrawal-related symptoms noted. Will plan for discharge tomorrow.      UDS: (+)cannabis  Blood alcohol: <10      Current Medications:   Scheduled Meds:    hydrOXYzine HCL  50 mg Oral BID    LORazepam  0.5 mg Oral TID    Followed " "by    [START ON 8/1/2025] LORazepam  0.5 mg Oral BID    nicotine  1 patch Transdermal Daily    OLANZapine  10 mg Oral QHS      PRN Meds:   Current Facility-Administered Medications:     acetaminophen, 650 mg, Oral, Q6H PRN    calcium carbonate, 1,000 mg, Oral, Q8H PRN    haloperidoL, 5 mg, Oral, Q4H PRN **AND** diphenhydrAMINE, 50 mg, Oral, Q4H PRN **AND** LORazepam, 2 mg, Oral, Q4H PRN **AND** haloperidol lactate, 5 mg, Intramuscular, Q4H PRN **AND** diphenhydrAMINE, 50 mg, Intramuscular, Q4H PRN **AND** lorazepam, 2 mg, Intramuscular, Q4H PRN    guaiFENesin 100 mg/5 ml, 200 mg, Oral, Q4H PRN    hydrOXYzine HCL, 50 mg, Oral, Q4H PRN    melatonin, 6 mg, Oral, Nightly PRN    ondansetron, 4 mg, Oral, Q8H PRN    traZODone, 100 mg, Oral, Nightly PRN   Psychotherapeutics (From admission, onward)      Start     Stop Route Frequency Ordered    08/01/25 0900  LORazepam tablet 0.5 mg  (Order Panel)        Placed in "Followed by" Linked Group    08/03/25 0859 Oral 2 times daily 07/28/25 0917    07/30/25 0900  LORazepam tablet 0.5 mg  (Order Panel)        Placed in "Followed by" Linked Group    08/01/25 0859 Oral 3 times daily 07/28/25 0917    07/27/25 2100  OLANZapine tablet 10 mg         -- Oral Nightly 07/27/25 1309    07/26/25 1902  haloperidoL tablet 5 mg  (Med - Acute  Behavioral Management)        Placed in "And" Linked Group    -- Oral Every 4 hours PRN 07/26/25 1902    07/26/25 1902  LORazepam tablet 2 mg  (Med - Acute  Behavioral Management)        Placed in "And" Linked Group    -- Oral Every 4 hours PRN 07/26/25 1902    07/26/25 1902  haloperidol lactate injection 5 mg  (Med - Acute  Behavioral Management)        Placed in "And" Linked Group    -- IM Every 4 hours PRN 07/26/25 1902    07/26/25 1902  LORazepam injection 2 mg  (Med - Acute  Behavioral Management)        Placed in "And" Linked Group    -- IM Every 4 hours PRN 07/26/25 1902 07/26/25 1902  traZODone tablet 100 mg         -- Oral Nightly PRN 07/26/25 " "1902            Allergies:   Review of patient's allergies indicates:  No Known Allergies     OBJECTIVE:   Vitals   Vitals:    07/31/25 0701   BP: 135/89   Pulse: 71   Resp: 20   Temp: 98.4 °F (36.9 °C)        Labs/Imaging/Studies:   No results found for this or any previous visit (from the past 36 hours).         Medical Review Of Systems:  Constitutional: negative  Respiratory: negative  Cardiovascular: negative  Gastrointestinal: negative  Genitourinary:negative  Musculoskeletal:negative  Neurological: negative       Psychiatric Mental Status Exam:  General Appearance: appears stated age, well-developed, well-nourished  Arousal: alert  Behavior: cooperative  Movements and Motor Activity: no abnormal involuntary movements noted  Orientation: oriented to person, place, time, and situation  Speech: normal rate, normal rhythm, normal volume, normal tone  Mood: "Pretty good"  Affect: constricted  Thought Process: linear  Associations: intact  Thought Content and Perceptions: denies acute hallucinations/delusions, no suicidal ideation, no homicidal ideation  Recent and Remote Memory: recent memory intact, remote memory intact; per interview/observation with patient  Attention and Concentration: intact, attentive to conversation; per interview/observation with patient  Fund of Knowledge: intact, aware of current events, vocabulary appropriate; based on history, vocabulary, fund of knowledge, syntax, grammar, and content  Insight: questionable; based on understanding of severity of illness and HPI  Judgment: questionable; based on patient's behavior and HPI     ASSESSMENT/PLAN:   Problems Addressed/Diagnoses:  Bipolar disorder, most recent episode manic, severe, with psychotic features (F31.2)  Generalized Anxiety Disorder (F41.1)   Cannabis use disorder (F12.20)       Past Medical History:   Diagnosis Date    Bipolar disorder     History of psychiatric hospitalization     Hx of psychiatric care     Therapy     "     Plan:  Bipolar disorder, chronic with acute exacerbation  -Continue Zyprexa    Anxiety, chronic with acute exacerbation  -Continue Hydroxyzine 50mg BID  -Benzodiazepine withdrawal protocol    Cannabis use, chronic with acute exacerbation  -Group/Individual psychotherapy     Expected Disposition Plan: Home        VANDANA Tang-BC

## 2025-07-31 NOTE — PLAN OF CARE
Problem: Adult Behavioral Health Plan of Care  Goal: Plan of Care Review  Outcome: Progressing  Flowsheets (Taken 7/30/2025 2000)  Patient Agreement with Plan of Care: agrees  Plan of Care Reviewed With: patient     Problem: Psychotic Signs/Symptoms  Goal: Improved Behavioral Control (Psychotic Signs/Symptoms)  Outcome: Progressing  Flowsheets (Taken 7/30/2025 2000)  Mutually Determined Action Steps (Improved Behavioral Control): verbalizes gratifying activity  Intervention: Manage Behavior  Flowsheets (Taken 7/30/2025 2000)  De-Escalation Techniques: quiet time facilitated  Goal: Optimal Cognitive Function (Psychotic Signs/Symptoms)  Outcome: Progressing  Flowsheets (Taken 7/30/2025 2000)  Mutually Determined Action Steps (Optimal Cognitive Function): follows step-by-step instructions  Intervention: Support and Promote Cognitive Ability  Flowsheets (Taken 7/30/2025 2000)  Trust Relationship/Rapport:   reassurance provided   questions encouraged   care explained  Goal: Increased Participation and Engagement (Psychotic Signs/Symptoms)  Outcome: Progressing  Flowsheets (Taken 7/30/2025 2000)  Mutually Determined Action Steps (Increased Participation and Engagement): verbalizes gratifying activity  Intervention: Facilitate Participation and Engagement  Flowsheets (Taken 7/30/2025 2000)  Supportive Measures:   self-care encouraged   verbalization of feelings encouraged   active listening utilized  Diversional Activity: television  Goal: Improved Mood Symptoms (Psychotic Signs/Symptoms)  Outcome: Progressing  Flowsheets (Taken 7/30/2025 2000)  Mutually Determined Action Steps (Improved Mood Symptoms): acknowledges progress  Intervention: Optimize Emotion and Mood  Flowsheets (Taken 7/30/2025 2000)  Supportive Measures:   self-care encouraged   verbalization of feelings encouraged   active listening utilized  Diversional Activity: television  Goal: Improved Psychomotor Symptoms (Psychotic Signs/Symptoms)  Outcome:  Progressing  Flowsheets (Taken 7/30/2025 2000)  Mutually Determined Action Steps (Improved Psychomotor Symptoms): adheres to medication regimen  Intervention: Manage Psychomotor Movement  Flowsheets (Taken 7/30/2025 2000)  Activity (Behavioral Health): up ad duncan  Patient Performed Hygiene: teeth brushed  Diversional Activity: television  Goal: Decreased Sensory Symptoms (Psychotic Signs/Symptoms)  Outcome: Progressing  Flowsheets (Taken 7/30/2025 2000)  Mutually Determined Action Steps (Decreased Sensory Symptoms): adheres to medication regimen  Intervention: Minimize and Manage Sensory Impairment  Flowsheets (Taken 7/30/2025 2000)  Sensory Stimulation Regulation: quiet environment promoted  Goal: Improved Sleep (Psychotic Signs/Symptoms)  Outcome: Progressing  Flowsheets (Taken 7/30/2025 2000)  Mutually Determined Action Steps (Improved Sleep): sleeps 4-6 hours at night  Intervention: Promote Healthy Sleep Hygiene  Flowsheets (Taken 7/30/2025 2000)  Sleep Hygiene Promotion:   awakenings minimized   regular sleep pattern promoted  Goal: Enhanced Social, Occupational or Functional Skills (Psychotic Signs/Symptoms)  Outcome: Progressing  Flowsheets (Taken 7/30/2025 2000)  Mutually Determined Action Steps (Enhanced Social, Occupational or Functional Skills): participates in social skills training  Intervention: Promote Social, Occupational and Functional Ability  Flowsheets (Taken 7/30/2025 2000)  Trust Relationship/Rapport:   reassurance provided   questions encouraged   care explained  Social Functional Ability Promotion:   autonomy promoted   self-expression encouraged   social interaction promoted     AAOx4 Pt denies SI/HI/AVH. Pt denies having anxiety and depression, reports good sleep and good appetite. Pt has fair eye contact and affect is flat. Pt is withdrawn and isolative to self with minimal participation. Q15 min safety checks continued.

## 2025-07-31 NOTE — PLAN OF CARE
Problem: Adult Behavioral Health Plan of Care  Goal: Plan of Care Review  Outcome: Progressing  Flowsheets (Taken 7/31/2025 1314)  Patient Agreement with Plan of Care: agrees  Plan of Care Reviewed With: patient     Problem: Psychotic Signs/Symptoms  Goal: Improved Behavioral Control (Psychotic Signs/Symptoms)  Outcome: Progressing  Flowsheets (Taken 7/31/2025 1314)  Mutually Determined Action Steps (Improved Behavioral Control): identifies symptoms triggers  Goal: Optimal Cognitive Function (Psychotic Signs/Symptoms)  Outcome: Progressing  Flowsheets (Taken 7/31/2025 1314)  Mutually Determined Action Steps (Optimal Cognitive Function): remains focused during activity  Goal: Improved Psychomotor Symptoms (Psychotic Signs/Symptoms)  Outcome: Progressing  Flowsheets (Taken 7/31/2025 1314)  Mutually Determined Action Steps (Improved Psychomotor Symptoms): adheres to medication regimen  Goal: Enhanced Social, Occupational or Functional Skills (Psychotic Signs/Symptoms)  Outcome: Progressing  Flowsheets (Taken 7/31/2025 1314)  Mutually Determined Action Steps (Enhanced Social, Occupational or Functional Skills): identifies personal strengths

## 2025-07-31 NOTE — NURSING
Patient is going home tomorrow, noted pacing at the hallway stated trying to make hours pass by fast.Slight tremors noted still on ativan taper.Denies headache ,confusion and sweating this morning. Denies SI and HI ,claimed that he will always have anxiety due to his diagnosis. Will go home to a trailer house next to her mothers residence.

## 2025-08-01 PROCEDURE — 25000003 PHARM REV CODE 250: Performed by: PSYCHIATRY & NEUROLOGY

## 2025-08-01 RX ADMIN — HYDROXYZINE HYDROCHLORIDE 50 MG: 50 TABLET ORAL at 08:08

## 2025-08-01 NOTE — DISCHARGE SUMMARY
DISCHARGE SUMMARY  PSYCHIATRY      Admit Date: 7/26/2025  6:38 PM    Discharge Date:  8/1/2025    SITE:   [facility]    Discharge Attending Physician: Wil Virgen M.D.    Chief Complaint:  Heidi    History of Present Illness On Admit:   Oscra Canseco is a 34-year-old male with a known diagnosis of bipolar I disorder with manic episodes, placed under a Physicians Emergency Certificate (PEC) at [facility] following presentation to [facility] ED. He was brought in by his parents, who reported escalating agitation, insomnia, and delusional thoughts and behaviors. According to his father, the patient had been awake for three days, speaking to himself, and experiencing visual hallucinations. He has a well-documented history of poor medication adherence, often resulting in psychiatric decompensation.    During todays evaluation, the patient appeared calm and cooperative, in contrast to his typical presentations, which are usually characterized by paranoia and hypervigilance. He was somewhat somnolent but maintained coherent conversation and orientation. Staff noted ongoing signs of heidi, including flight of ideas and intrusiveness toward peers. He received an oral B52 the previous day due to difficulty with redirection.    He was last hospitalized at this facility in April 2025 for a near-identical manic episode triggered by medication noncompliance. The patient again reported that Zyprexa had been effective for symptom control in the past. He also disclosed that his mother had flushed his prescribed Xanax, which he believes contributed to his current insomnia and distress. He denies current suicidal ideation, homicidal ideation, or psychotic symptoms, though family and staff observations suggest persistent underlying symptoms.       Admit Mental Status Exam:  General Appearance: appears stated age, well developed and nourished, adequately groomed and  appropriately dressed, in no acute distress  Arousal: alert  Behavior: normal; cooperative; reasonably friendly, pleasant, and polite; appropriate eye-contact; under good behavioral control  Movements and Motor Activity: no abnormal involuntary movements noted; no tics, no tremors, no akathisia, no dystonia, no evidence of tardive dyskinesia; no psychomotor agitation or retardation  Orientation: oriented to person, place, time, and situation  Speech: intact; normal rate, rhythm, volume, tone and pitch; conversational, spontaneous, and coherent  Mood: Dysphoric and Guarded  Affect: constricted  Thought Process: intact, linear, goal-directed, organized, logical  Associations: intact, no loosening of associations  Thought Content and Perceptions: no suicidal or homicidal ideation, no auditory or visual hallucinations, no paranoid ideation, no ideas of reference, no evidence of delusions or psychosis  Recent and Remote Memory: grossly intact, able to recall relevant and salient information from the recent and remote past; per interview/observation with patient  Attention and Concentration: grossly intact, attentive to the conversation and not readily distractible; per interview/observation with patient  Fund of Knowledge: grossly intact, used appropriate vocabulary and demonstrated an awareness of current events; based on history, vocabulary, fund of knowledge, syntax, grammar, and content  Insight: intact; based on understanding of severity of illness and HPI  Judgment: questionable; based on patient's behavior and HPI      Diagnoses:  PRINCIPAL PROBLEM:  Bipolar I, most recent episode manic, severe with psychotic behavior      PROBLEM LIST    Bipolar I, most recent episode manic, severe with psychotic behavior    Cannabis dependence, continuous    Generalized anxiety disorder        Hospital Course:   07/28  Patient was recently here in April of this year.  Followed by [facility].  Armaan Kramer  "Ph.D.  On Klonopin 1mg BID, Adderall 30mg BID, Hydroxyzine 50mg BID, and Ambien 10mg HS.     Grossly cooperative with evaluation.  Will restart some of his psychiatric medication and will monitor progress.  States that he plans to return home on discharge.  Constricted affect.     07/29  Today patient states that he is feeling much better. Affect remains constricted but definitely improved.  Grossly cooperative with evaluation. Tolerating current regimen without issue. Will continue with current POC and will monitor progress.      07/30  Attending some groups but does not always stay for the entirety.  This morning, he states that he is "pretty good but a little antsy."  Less hostile feeling.  No tremors, diaphoresis, diarrhea, or other withdrawal-related symptoms noted.      07/31  Patient seen today during psychiatric rounds. Presentation remains grossly unchanged from prior evaluation. Patient reports no significant change in mood, thought content, or behavior. Continues to deny suicidal or homicidal ideation, auditory or visual hallucinations, and delusional thinking.     Mood is described as "at ease". Affect appears appropriate. No acute distress observed. Patient remains cooperative with the interview and demonstrates no overt behavioral issues on the unit per staff report.     Tolerating current medication regimen without reported side effects. Eating and sleeping are adequate, and no new medical concerns are reported.     No indication at this time for medication adjustment. Will continue current plan of care and monitor closely for any emerging symptoms or changes in mental status. Patient remains appropriate for continued inpatient psychiatric treatment due to need for ongoing observation, stabilization, and structured environment. No tremors, diaphoresis, diarrhea, or other withdrawal-related symptoms noted. Will plan for discharge tomorrow.    08/01  Patient was evaluated today for final psychiatric " "assessment prior to discharge. Patient appears psychiatrically stable and demonstrates improved insight, mood regulation, and behavioral control compared to admission. Denies suicidal or homicidal ideation, hallucinations, or delusional thought content at this time. Thought process is linear and goal-directed, and affect is appropriate to content.    Patient has been adherent to the prescribed medication regimen without evidence of significant side effects. Reports tolerating medications well and verbalizes understanding of the treatment plan. Sleep and appetite are reported as improved. No behavioral issues have been noted by unit staff in the past 24-48 hours.    Patient has actively participated in treatment to the extent capable and has demonstrated sufficient stabilization to warrant discharge to a lower level of care. Safety plan, medication education, and aftercare instructions have been reviewed. Patient voiced understanding and agreement with the discharge plan. No contraindications to discharge identified at this time.       Current Medications:   Scheduled Meds:    hydrOXYzine HCL  50 mg Oral BID    nicotine  1 patch Transdermal Daily    OLANZapine  10 mg Oral QHS          DISCHARGE EXAMINATION    VITALS   Vitals:    07/29/25 1600 07/29/25 1901 07/30/25 0800 07/31/25 0701   BP: 127/86 120/87 (!) 130/90 135/89   BP Location:  Left arm     Patient Position:  Sitting     Pulse: 80 69 69 71   Resp: 18 18 18 20   Temp: 99 °F (37.2 °C) 98 °F (36.7 °C) 98.9 °F (37.2 °C) 98.4 °F (36.9 °C)   TempSrc:  Oral     SpO2: 99% 99%  100%   Weight:       Height:             Discharge Mental Status Exam:  General Appearance: appears stated age, well-developed, well-nourished  Arousal: alert  Behavior: cooperative  Movements and Motor Activity: no abnormal involuntary movements noted  Orientation: oriented to person, place, time, and situation  Speech: normal rate, normal rhythm, normal volume, normal tone  Mood: "Pretty " "good"  Affect: constricted  Thought Process: linear  Associations: intact  Thought Content and Perceptions: denies acute hallucinations/delusions, no suicidal ideation, no homicidal ideation  Recent and Remote Memory: recent memory intact, remote memory intact; per interview/observation with patient  Attention and Concentration: intact, attentive to conversation; per interview/observation with patient  Fund of Knowledge: intact, aware of current events, vocabulary appropriate; based on history, vocabulary, fund of knowledge, syntax, grammar, and content  Insight: questionable; based on understanding of severity of illness and HPI  Judgment: questionable; based on patient's behavior and HPI       Discharge Condition:  Stable    Prognosis:  Fair    Justification for multiple antipsychotics:  N/a    Disposition:  discharged to home    Follow-up:     Follow-up Information       [facility] Follow up.    Why: 9.22.2025 @ 12:45pm  Contact information:  [facility] [place] [address]        [phone]-fax             [facility] Follow up.    Why: Facility will follow-up with pt directly.  Contact information:  [phone]-fax                           Medication Regimen:  Scheduled Meds:    hydrOXYzine HCL  50 mg Oral BID    nicotine  1 patch Transdermal Daily    OLANZapine  10 mg Oral QHS          Patient Instructions:   Continue medication regimen as prescribed.    Disposition plan per  - see  notes for details.    Patient instructed to call 911 or present to emergency department if any of the following complications develop status post discharge: suicidality, homicidality, or grave disability.     Total time spent discharging patient: <30 minutes      VANDANA Tang   "

## 2025-08-01 NOTE — PLAN OF CARE
Problem: Adult Behavioral Health Plan of Care  Goal: Plan of Care Review  Outcome: Met     Problem: Psychotic Signs/Symptoms  Goal: Improved Behavioral Control (Psychotic Signs/Symptoms)  Outcome: Met  Goal: Optimal Cognitive Function (Psychotic Signs/Symptoms)  Outcome: Met  Goal: Increased Participation and Engagement (Psychotic Signs/Symptoms)  Outcome: Met  Goal: Improved Mood Symptoms (Psychotic Signs/Symptoms)  Outcome: Met  Goal: Improved Psychomotor Symptoms (Psychotic Signs/Symptoms)  Outcome: Met  Goal: Decreased Sensory Symptoms (Psychotic Signs/Symptoms)  Outcome: Met  Goal: Improved Sleep (Psychotic Signs/Symptoms)  Outcome: Met  Goal: Enhanced Social, Occupational or Functional Skills (Psychotic Signs/Symptoms)  Outcome: Met     AAOx4 Pt denies SI/HI/AVH. Pt endorses having anxiety and depression, reports good sleep and good appetite. Pt has good eye contact and affect is flat. Q15 min safety checks continued.

## 2025-08-01 NOTE — CARE UPDATE
Transportation set up via uber; leaving in [facility].    Sw spoke with pt mother and confirmed pt can dc to her address located at [address] [place] [address]

## 2025-08-01 NOTE — NURSING
Patient denies thoughts of self-harm or wanting to harm other. No hallucinations reported. He states readiness for discharge. Staff will monitor for safety.

## 2025-08-01 NOTE — NURSING
Discharge Note:    Oscar Canseco is a 34 y.o. male, : 1990, MRN: [id number], admitted on 2025 for Wil Virgen MD with a diagnosis of Psychosis [F29].    Patient discharged on 2025 per physician orders in stable condition. Patient denied suicidal ideation, homicidal ideation, or hallucinations. Patient was discharged with valuables, personal belongings, prescriptions, discharge instructions, printed Warning Sings of Self-Harm pursuant to Act 737 and, an educational handout explaining the diagnosis and prescribed medications. Patient verbalized understanding of the discharge instructions and importance of follow-up visits. Patient was escorted out of the facility by [facility] and placed into a Uber to be transported to home.     Patient discharged on the following medications:     Medication List        START taking these medications      nicotine 21 mg/24 hr  Commonly known as: NICODERM CQ  Place 1 patch onto the skin once daily.            CONTINUE taking these medications      OLANZapine 10 MG tablet  Commonly known as: ZyPREXA  Take 1 tablet (10 mg total) by mouth every evening.               Where to Get Your Medications        You can get these medications from any pharmacy    Bring a paper prescription for each of these medications  nicotine 21 mg/24 hr  OLANZapine 10 MG tablet

## 2025-08-01 NOTE — NURSING
2011 pt requesting medication for sleep and anxiety. Pt given 6mg melatonin, 100mg trazodone and 50mg atarax PO    2041 pt sleeping peacefully in bed. ERIC

## 2025-08-02 LAB — TRAZODONE SERPL-MCNC: <20 NG/ML (ref 800–1600)
